# Patient Record
Sex: MALE | Race: WHITE | Employment: OTHER | URBAN - METROPOLITAN AREA
[De-identification: names, ages, dates, MRNs, and addresses within clinical notes are randomized per-mention and may not be internally consistent; named-entity substitution may affect disease eponyms.]

---

## 2019-03-14 ENCOUNTER — HOSPITAL ENCOUNTER (OUTPATIENT)
Dept: GENERAL RADIOLOGY | Age: 84
Discharge: HOME | End: 2019-03-16
Payer: MEDICARE

## 2019-03-14 ENCOUNTER — HOSPITAL ENCOUNTER (OUTPATIENT)
Age: 84
Discharge: HOME | End: 2019-03-16
Payer: MEDICARE

## 2019-03-14 DIAGNOSIS — Z96.641 AFTERCARE FOLLOWING RIGHT HIP JOINT REPLACEMENT SURGERY: ICD-10-CM

## 2019-03-14 DIAGNOSIS — Z47.1 AFTERCARE FOLLOWING RIGHT HIP JOINT REPLACEMENT SURGERY: ICD-10-CM

## 2019-03-14 PROCEDURE — 73502 X-RAY EXAM HIP UNI 2-3 VIEWS: CPT

## 2020-03-23 ENCOUNTER — INITIAL CONSULT (OUTPATIENT)
Dept: GERIATRIC MEDICINE | Age: 85
End: 2020-03-23
Payer: MEDICARE

## 2020-03-23 VITALS
HEART RATE: 80 BPM | TEMPERATURE: 97.9 F | DIASTOLIC BLOOD PRESSURE: 84 MMHG | RESPIRATION RATE: 26 BRPM | SYSTOLIC BLOOD PRESSURE: 132 MMHG | HEIGHT: 67 IN | WEIGHT: 243.8 LBS | BODY MASS INDEX: 38.27 KG/M2

## 2020-03-23 PROCEDURE — G8482 FLU IMMUNIZE ORDER/ADMIN: HCPCS | Performed by: INTERNAL MEDICINE

## 2020-03-23 PROCEDURE — 4040F PNEUMOC VAC/ADMIN/RCVD: CPT | Performed by: INTERNAL MEDICINE

## 2020-03-23 PROCEDURE — 1036F TOBACCO NON-USER: CPT | Performed by: INTERNAL MEDICINE

## 2020-03-23 PROCEDURE — 1123F ACP DISCUSS/DSCN MKR DOCD: CPT | Performed by: INTERNAL MEDICINE

## 2020-03-23 PROCEDURE — 99201 HC NEW PT, E/M LEVEL 1: CPT | Performed by: INTERNAL MEDICINE

## 2020-03-23 PROCEDURE — G8417 CALC BMI ABV UP PARAM F/U: HCPCS | Performed by: INTERNAL MEDICINE

## 2020-03-23 PROCEDURE — 96372 THER/PROPH/DIAG INJ SC/IM: CPT | Performed by: INTERNAL MEDICINE

## 2020-03-23 PROCEDURE — 99212 OFFICE O/P EST SF 10 MIN: CPT | Performed by: INTERNAL MEDICINE

## 2020-03-23 PROCEDURE — G8427 DOCREV CUR MEDS BY ELIG CLIN: HCPCS | Performed by: INTERNAL MEDICINE

## 2020-03-23 RX ORDER — DONEPEZIL HYDROCHLORIDE 10 MG/1
10 TABLET, FILM COATED ORAL DAILY
COMMUNITY
End: 2020-08-21 | Stop reason: SDUPTHER

## 2020-03-23 RX ORDER — FENOFIBRATE 145 MG/1
145 TABLET, COATED ORAL DAILY
Status: ON HOLD | COMMUNITY
End: 2022-01-01

## 2020-03-23 RX ORDER — CYANOCOBALAMIN 1000 UG/ML
1000 INJECTION INTRAMUSCULAR; SUBCUTANEOUS ONCE
Status: COMPLETED | OUTPATIENT
Start: 2020-03-23 | End: 2020-03-23

## 2020-03-23 RX ORDER — FUROSEMIDE 40 MG/1
40 TABLET ORAL DAILY
Status: ON HOLD | COMMUNITY
End: 2021-01-01 | Stop reason: HOSPADM

## 2020-03-23 RX ORDER — BENAZEPRIL HYDROCHLORIDE 40 MG/1
40 TABLET, FILM COATED ORAL 2 TIMES DAILY
Status: ON HOLD | COMMUNITY
End: 2021-01-01 | Stop reason: HOSPADM

## 2020-03-23 RX ORDER — MEMANTINE HYDROCHLORIDE 10 MG/1
10 TABLET ORAL 2 TIMES DAILY
COMMUNITY

## 2020-03-23 RX ORDER — GLIMEPIRIDE 2 MG/1
2 TABLET ORAL
Status: ON HOLD | COMMUNITY
End: 2020-04-24 | Stop reason: HOSPADM

## 2020-03-23 RX ADMIN — CYANOCOBALAMIN 1000 MCG: 1000 INJECTION, SOLUTION INTRAMUSCULAR; SUBCUTANEOUS at 16:39

## 2020-03-23 ASSESSMENT — PATIENT HEALTH QUESTIONNAIRE - PHQ9
SUM OF ALL RESPONSES TO PHQ9 QUESTIONS 1 & 2: 0
SUM OF ALL RESPONSES TO PHQ QUESTIONS 1-9: 0
1. LITTLE INTEREST OR PLEASURE IN DOING THINGS: 0
2. FEELING DOWN, DEPRESSED OR HOPELESS: 0
SUM OF ALL RESPONSES TO PHQ QUESTIONS 1-9: 0

## 2020-03-23 NOTE — PROGRESS NOTES
Chief Complaint   Patient presents with    Consultation     Referral from PCP, Dr Gregoria Ferrara re: memory issues.

## 2020-03-30 ENCOUNTER — TELEPHONE (OUTPATIENT)
Dept: GERIATRIC MEDICINE | Age: 85
End: 2020-03-30

## 2020-04-13 ENCOUNTER — TELEPHONE (OUTPATIENT)
Dept: GERIATRIC MEDICINE | Age: 85
End: 2020-04-13

## 2020-04-15 NOTE — TELEPHONE ENCOUNTER
Left message for daughter - wanting to discuss BMV form. I will be out of the office until Monday and will try to reach her then.

## 2020-04-19 ENCOUNTER — APPOINTMENT (OUTPATIENT)
Dept: GENERAL RADIOLOGY | Age: 85
DRG: 175 | End: 2020-04-19
Payer: MEDICARE

## 2020-04-19 ENCOUNTER — HOSPITAL ENCOUNTER (INPATIENT)
Age: 85
LOS: 5 days | Discharge: NURSING, REHAB OR LONG-TERM CARE | DRG: 175 | End: 2020-04-24
Attending: EMERGENCY MEDICINE | Admitting: FAMILY MEDICINE
Payer: MEDICARE

## 2020-04-19 ENCOUNTER — APPOINTMENT (OUTPATIENT)
Dept: CT IMAGING | Age: 85
DRG: 175 | End: 2020-04-19
Payer: MEDICARE

## 2020-04-19 PROBLEM — I26.99 ACUTE PULMONARY EMBOLISM WITHOUT ACUTE COR PULMONALE (HCC): Status: ACTIVE | Noted: 2020-04-19

## 2020-04-19 LAB
ALBUMIN SERPL-MCNC: 3.3 G/DL (ref 3.5–5.2)
ALP BLD-CCNC: 36 U/L (ref 40–129)
ALT SERPL-CCNC: 12 U/L (ref 0–40)
ANION GAP SERPL CALCULATED.3IONS-SCNC: 14 MMOL/L (ref 7–16)
APTT: 33.3 SEC (ref 24.5–35.1)
APTT: 92.9 SEC (ref 24.5–35.1)
AST SERPL-CCNC: 39 U/L (ref 0–39)
BASOPHILS ABSOLUTE: 0.08 E9/L (ref 0–0.2)
BASOPHILS RELATIVE PERCENT: 0.9 % (ref 0–2)
BILIRUB SERPL-MCNC: 0.3 MG/DL (ref 0–1.2)
BILIRUBIN DIRECT: <0.2 MG/DL (ref 0–0.3)
BILIRUBIN, INDIRECT: ABNORMAL MG/DL (ref 0–1)
BUN BLDV-MCNC: 16 MG/DL (ref 8–23)
CALCIUM SERPL-MCNC: 9.1 MG/DL (ref 8.6–10.2)
CHLORIDE BLD-SCNC: 99 MMOL/L (ref 98–107)
CO2: 25 MMOL/L (ref 22–29)
CREAT SERPL-MCNC: 1.5 MG/DL (ref 0.7–1.2)
D DIMER: 678 NG/ML DDU
EKG ATRIAL RATE: 105 BPM
EKG Q-T INTERVAL: 382 MS
EKG QRS DURATION: 116 MS
EKG QTC CALCULATION (BAZETT): 457 MS
EKG R AXIS: -63 DEGREES
EKG T AXIS: 64 DEGREES
EKG VENTRICULAR RATE: 86 BPM
EOSINOPHILS ABSOLUTE: 0.2 E9/L (ref 0.05–0.5)
EOSINOPHILS RELATIVE PERCENT: 2.4 % (ref 0–6)
FIBRINOGEN: 553 MG/DL (ref 225–540)
GFR AFRICAN AMERICAN: 54
GFR NON-AFRICAN AMERICAN: 44 ML/MIN/1.73
GLUCOSE BLD-MCNC: 232 MG/DL (ref 74–99)
HCT VFR BLD CALC: 46.6 % (ref 37–54)
HEMOGLOBIN: 15.3 G/DL (ref 12.5–16.5)
IMMATURE GRANULOCYTES #: 0.04 E9/L
IMMATURE GRANULOCYTES %: 0.5 % (ref 0–5)
INR BLD: 1.1
LACTATE DEHYDROGENASE: 334 U/L (ref 135–225)
LYMPHOCYTES ABSOLUTE: 2.03 E9/L (ref 1.5–4)
LYMPHOCYTES RELATIVE PERCENT: 23.9 % (ref 20–42)
MCH RBC QN AUTO: 30.6 PG (ref 26–35)
MCHC RBC AUTO-ENTMCNC: 32.8 % (ref 32–34.5)
MCV RBC AUTO: 93.2 FL (ref 80–99.9)
METER GLUCOSE: 190 MG/DL (ref 74–99)
MONOCYTES ABSOLUTE: 0.81 E9/L (ref 0.1–0.95)
MONOCYTES RELATIVE PERCENT: 9.5 % (ref 2–12)
NEUTROPHILS ABSOLUTE: 5.35 E9/L (ref 1.8–7.3)
NEUTROPHILS RELATIVE PERCENT: 62.8 % (ref 43–80)
PDW BLD-RTO: 13.9 FL (ref 11.5–15)
PLATELET # BLD: 248 E9/L (ref 130–450)
PMV BLD AUTO: 11.5 FL (ref 7–12)
POTASSIUM SERPL-SCNC: 3.8 MMOL/L (ref 3.5–5)
PRO-BNP: 8530 PG/ML (ref 0–450)
PROCALCITONIN: 0.07 NG/ML (ref 0–0.08)
PROTHROMBIN TIME: 12.7 SEC (ref 9.3–12.4)
RBC # BLD: 5 E12/L (ref 3.8–5.8)
REASON FOR REJECTION: NORMAL
REJECTED TEST: NORMAL
SARS-COV-2, NAAT: NOT DETECTED
SODIUM BLD-SCNC: 138 MMOL/L (ref 132–146)
TOTAL PROTEIN: 6.4 G/DL (ref 6.4–8.3)
TROPONIN: 0.03 NG/ML (ref 0–0.03)
WBC # BLD: 8.5 E9/L (ref 4.5–11.5)

## 2020-04-19 PROCEDURE — 84484 ASSAY OF TROPONIN QUANT: CPT

## 2020-04-19 PROCEDURE — 83615 LACTATE (LD) (LDH) ENZYME: CPT

## 2020-04-19 PROCEDURE — 93010 ELECTROCARDIOGRAM REPORT: CPT | Performed by: INTERNAL MEDICINE

## 2020-04-19 PROCEDURE — 85378 FIBRIN DEGRADE SEMIQUANT: CPT

## 2020-04-19 PROCEDURE — 85730 THROMBOPLASTIN TIME PARTIAL: CPT

## 2020-04-19 PROCEDURE — 84145 PROCALCITONIN (PCT): CPT

## 2020-04-19 PROCEDURE — 71275 CT ANGIOGRAPHY CHEST: CPT

## 2020-04-19 PROCEDURE — 86140 C-REACTIVE PROTEIN: CPT

## 2020-04-19 PROCEDURE — 94761 N-INVAS EAR/PLS OXIMETRY MLT: CPT

## 2020-04-19 PROCEDURE — 2580000003 HC RX 258: Performed by: RADIOLOGY

## 2020-04-19 PROCEDURE — 93005 ELECTROCARDIOGRAM TRACING: CPT | Performed by: STUDENT IN AN ORGANIZED HEALTH CARE EDUCATION/TRAINING PROGRAM

## 2020-04-19 PROCEDURE — 2580000003 HC RX 258: Performed by: PHYSICIAN ASSISTANT

## 2020-04-19 PROCEDURE — 82962 GLUCOSE BLOOD TEST: CPT

## 2020-04-19 PROCEDURE — 96374 THER/PROPH/DIAG INJ IV PUSH: CPT

## 2020-04-19 PROCEDURE — 83520 IMMUNOASSAY QUANT NOS NONAB: CPT

## 2020-04-19 PROCEDURE — 6370000000 HC RX 637 (ALT 250 FOR IP): Performed by: PHYSICIAN ASSISTANT

## 2020-04-19 PROCEDURE — 6360000004 HC RX CONTRAST MEDICATION: Performed by: RADIOLOGY

## 2020-04-19 PROCEDURE — 80048 BASIC METABOLIC PNL TOTAL CA: CPT

## 2020-04-19 PROCEDURE — 0100U HC RESPIRPTHGN MULT REV TRANS & AMP PRB TECH 21 TRGT: CPT

## 2020-04-19 PROCEDURE — 6360000002 HC RX W HCPCS: Performed by: STUDENT IN AN ORGANIZED HEALTH CARE EDUCATION/TRAINING PROGRAM

## 2020-04-19 PROCEDURE — 99223 1ST HOSP IP/OBS HIGH 75: CPT | Performed by: INTERNAL MEDICINE

## 2020-04-19 PROCEDURE — 85384 FIBRINOGEN ACTIVITY: CPT

## 2020-04-19 PROCEDURE — 85610 PROTHROMBIN TIME: CPT

## 2020-04-19 PROCEDURE — 80076 HEPATIC FUNCTION PANEL: CPT

## 2020-04-19 PROCEDURE — 36415 COLL VENOUS BLD VENIPUNCTURE: CPT

## 2020-04-19 PROCEDURE — 2060000000 HC ICU INTERMEDIATE R&B

## 2020-04-19 PROCEDURE — 71045 X-RAY EXAM CHEST 1 VIEW: CPT

## 2020-04-19 PROCEDURE — 83036 HEMOGLOBIN GLYCOSYLATED A1C: CPT

## 2020-04-19 PROCEDURE — 99285 EMERGENCY DEPT VISIT HI MDM: CPT

## 2020-04-19 PROCEDURE — 93005 ELECTROCARDIOGRAM TRACING: CPT | Performed by: INTERNAL MEDICINE

## 2020-04-19 PROCEDURE — 83880 ASSAY OF NATRIURETIC PEPTIDE: CPT

## 2020-04-19 PROCEDURE — U0002 COVID-19 LAB TEST NON-CDC: HCPCS

## 2020-04-19 PROCEDURE — 85025 COMPLETE CBC W/AUTO DIFF WBC: CPT

## 2020-04-19 PROCEDURE — 2700000000 HC OXYGEN THERAPY PER DAY

## 2020-04-19 RX ORDER — POTASSIUM CHLORIDE 20 MEQ/1
40 TABLET, EXTENDED RELEASE ORAL PRN
Status: DISCONTINUED | OUTPATIENT
Start: 2020-04-19 | End: 2020-04-24 | Stop reason: HOSPADM

## 2020-04-19 RX ORDER — DONEPEZIL HYDROCHLORIDE 5 MG/1
10 TABLET, FILM COATED ORAL DAILY
Status: DISCONTINUED | OUTPATIENT
Start: 2020-04-19 | End: 2020-04-24 | Stop reason: HOSPADM

## 2020-04-19 RX ORDER — DEXTROSE MONOHYDRATE 25 G/50ML
12.5 INJECTION, SOLUTION INTRAVENOUS PRN
Status: DISCONTINUED | OUTPATIENT
Start: 2020-04-19 | End: 2020-04-24 | Stop reason: HOSPADM

## 2020-04-19 RX ORDER — LISINOPRIL 20 MG/1
40 TABLET ORAL 2 TIMES DAILY
Status: DISCONTINUED | OUTPATIENT
Start: 2020-04-19 | End: 2020-04-24 | Stop reason: HOSPADM

## 2020-04-19 RX ORDER — POTASSIUM CHLORIDE 7.45 MG/ML
10 INJECTION INTRAVENOUS PRN
Status: DISCONTINUED | OUTPATIENT
Start: 2020-04-19 | End: 2020-04-24 | Stop reason: HOSPADM

## 2020-04-19 RX ORDER — NICOTINE POLACRILEX 4 MG
15 LOZENGE BUCCAL PRN
Status: DISCONTINUED | OUTPATIENT
Start: 2020-04-19 | End: 2020-04-24 | Stop reason: HOSPADM

## 2020-04-19 RX ORDER — HEPARIN SODIUM 1000 [USP'U]/ML
80 INJECTION, SOLUTION INTRAVENOUS; SUBCUTANEOUS ONCE
Status: COMPLETED | OUTPATIENT
Start: 2020-04-19 | End: 2020-04-19

## 2020-04-19 RX ORDER — HYDROXYCHLOROQUINE SULFATE 200 MG/1
200 TABLET, FILM COATED ORAL EVERY 12 HOURS
Status: DISCONTINUED | OUTPATIENT
Start: 2020-04-20 | End: 2020-04-19

## 2020-04-19 RX ORDER — BUDESONIDE AND FORMOTEROL FUMARATE DIHYDRATE 80; 4.5 UG/1; UG/1
2 AEROSOL RESPIRATORY (INHALATION) 2 TIMES DAILY
Status: DISCONTINUED | OUTPATIENT
Start: 2020-04-19 | End: 2020-04-24 | Stop reason: HOSPADM

## 2020-04-19 RX ORDER — GLIMEPIRIDE 2 MG/1
2 TABLET ORAL
Status: DISCONTINUED | OUTPATIENT
Start: 2020-04-19 | End: 2020-04-24 | Stop reason: HOSPADM

## 2020-04-19 RX ORDER — DEXTROSE MONOHYDRATE 50 MG/ML
100 INJECTION, SOLUTION INTRAVENOUS PRN
Status: DISCONTINUED | OUTPATIENT
Start: 2020-04-19 | End: 2020-04-24 | Stop reason: HOSPADM

## 2020-04-19 RX ORDER — ONDANSETRON 2 MG/ML
4 INJECTION INTRAMUSCULAR; INTRAVENOUS EVERY 6 HOURS PRN
Status: DISCONTINUED | OUTPATIENT
Start: 2020-04-19 | End: 2020-04-24 | Stop reason: HOSPADM

## 2020-04-19 RX ORDER — ACETAMINOPHEN 325 MG/1
650 TABLET ORAL EVERY 6 HOURS PRN
Status: DISCONTINUED | OUTPATIENT
Start: 2020-04-19 | End: 2020-04-24 | Stop reason: HOSPADM

## 2020-04-19 RX ORDER — LANOLIN ALCOHOL/MO/W.PET/CERES
1000 CREAM (GRAM) TOPICAL DAILY
COMMUNITY

## 2020-04-19 RX ORDER — HEPARIN SODIUM 1000 [USP'U]/ML
80 INJECTION, SOLUTION INTRAVENOUS; SUBCUTANEOUS ONCE
Status: CANCELLED | OUTPATIENT
Start: 2020-04-19 | End: 2020-04-19

## 2020-04-19 RX ORDER — HYDROXYCHLOROQUINE SULFATE 200 MG/1
400 TABLET, FILM COATED ORAL EVERY 12 HOURS
Status: DISCONTINUED | OUTPATIENT
Start: 2020-04-19 | End: 2020-04-19

## 2020-04-19 RX ORDER — HEPARIN SODIUM 10000 [USP'U]/100ML
18 INJECTION, SOLUTION INTRAVENOUS CONTINUOUS
Status: CANCELLED | OUTPATIENT
Start: 2020-04-19

## 2020-04-19 RX ORDER — PROMETHAZINE HYDROCHLORIDE 25 MG/1
12.5 TABLET ORAL EVERY 6 HOURS PRN
Status: DISCONTINUED | OUTPATIENT
Start: 2020-04-19 | End: 2020-04-24 | Stop reason: HOSPADM

## 2020-04-19 RX ORDER — HEPARIN SODIUM 1000 [USP'U]/ML
80 INJECTION, SOLUTION INTRAVENOUS; SUBCUTANEOUS PRN
Status: CANCELLED | OUTPATIENT
Start: 2020-04-19

## 2020-04-19 RX ORDER — GLIMEPIRIDE 2 MG/1
1 TABLET ORAL
Status: DISCONTINUED | OUTPATIENT
Start: 2020-04-20 | End: 2020-04-24 | Stop reason: HOSPADM

## 2020-04-19 RX ORDER — SODIUM CHLORIDE 0.9 % (FLUSH) 0.9 %
10 SYRINGE (ML) INJECTION EVERY 12 HOURS SCHEDULED
Status: DISCONTINUED | OUTPATIENT
Start: 2020-04-19 | End: 2020-04-24 | Stop reason: HOSPADM

## 2020-04-19 RX ORDER — SODIUM CHLORIDE 0.9 % (FLUSH) 0.9 %
10 SYRINGE (ML) INJECTION PRN
Status: COMPLETED | OUTPATIENT
Start: 2020-04-19 | End: 2020-04-19

## 2020-04-19 RX ORDER — ACETAMINOPHEN 650 MG/1
650 SUPPOSITORY RECTAL EVERY 6 HOURS PRN
Status: DISCONTINUED | OUTPATIENT
Start: 2020-04-19 | End: 2020-04-24 | Stop reason: HOSPADM

## 2020-04-19 RX ORDER — MEMANTINE HYDROCHLORIDE 10 MG/1
10 TABLET ORAL 2 TIMES DAILY
Status: DISCONTINUED | OUTPATIENT
Start: 2020-04-19 | End: 2020-04-24 | Stop reason: HOSPADM

## 2020-04-19 RX ORDER — HEPARIN SODIUM 1000 [USP'U]/ML
40 INJECTION, SOLUTION INTRAVENOUS; SUBCUTANEOUS PRN
Status: CANCELLED | OUTPATIENT
Start: 2020-04-19

## 2020-04-19 RX ORDER — FENOFIBRATE 160 MG/1
160 TABLET ORAL DAILY
Status: DISCONTINUED | OUTPATIENT
Start: 2020-04-19 | End: 2020-04-24 | Stop reason: HOSPADM

## 2020-04-19 RX ORDER — HEPARIN SODIUM 1000 [USP'U]/ML
40 INJECTION, SOLUTION INTRAVENOUS; SUBCUTANEOUS PRN
Status: DISCONTINUED | OUTPATIENT
Start: 2020-04-19 | End: 2020-04-24 | Stop reason: HOSPADM

## 2020-04-19 RX ORDER — HEPARIN SODIUM 1000 [USP'U]/ML
80 INJECTION, SOLUTION INTRAVENOUS; SUBCUTANEOUS PRN
Status: DISCONTINUED | OUTPATIENT
Start: 2020-04-19 | End: 2020-04-24 | Stop reason: HOSPADM

## 2020-04-19 RX ORDER — SODIUM CHLORIDE 9 MG/ML
500 INJECTION, SOLUTION INTRAVENOUS CONTINUOUS
Status: DISCONTINUED | OUTPATIENT
Start: 2020-04-19 | End: 2020-04-19

## 2020-04-19 RX ORDER — HEPARIN SODIUM 10000 [USP'U]/100ML
18 INJECTION, SOLUTION INTRAVENOUS CONTINUOUS
Status: DISCONTINUED | OUTPATIENT
Start: 2020-04-19 | End: 2020-04-24 | Stop reason: HOSPADM

## 2020-04-19 RX ORDER — SODIUM CHLORIDE 0.9 % (FLUSH) 0.9 %
10 SYRINGE (ML) INJECTION PRN
Status: DISCONTINUED | OUTPATIENT
Start: 2020-04-19 | End: 2020-04-24 | Stop reason: HOSPADM

## 2020-04-19 RX ADMIN — MEMANTINE HYDROCHLORIDE 10 MG: 10 TABLET, FILM COATED ORAL at 20:30

## 2020-04-19 RX ADMIN — HEPARIN SODIUM 18 UNITS/KG/HR: 10000 INJECTION, SOLUTION INTRAVENOUS at 15:46

## 2020-04-19 RX ADMIN — LISINOPRIL 40 MG: 20 TABLET ORAL at 20:30

## 2020-04-19 RX ADMIN — INSULIN LISPRO 1 UNITS: 100 INJECTION, SOLUTION INTRAVENOUS; SUBCUTANEOUS at 21:27

## 2020-04-19 RX ADMIN — Medication 10 ML: at 14:43

## 2020-04-19 RX ADMIN — HEPARIN SODIUM 9030 UNITS: 1000 INJECTION, SOLUTION INTRAVENOUS; SUBCUTANEOUS at 15:46

## 2020-04-19 RX ADMIN — IOPAMIDOL 90 ML: 755 INJECTION, SOLUTION INTRAVENOUS at 14:45

## 2020-04-19 RX ADMIN — Medication 10 ML: at 21:26

## 2020-04-19 RX ADMIN — BUDESONIDE AND FORMOTEROL FUMARATE DIHYDRATE 2 PUFF: 80; 4.5 AEROSOL RESPIRATORY (INHALATION) at 21:29

## 2020-04-19 ASSESSMENT — ENCOUNTER SYMPTOMS
COUGH: 0
DIARRHEA: 0
VOMITING: 0
CONSTIPATION: 0
COLOR CHANGE: 0
WHEEZING: 0
ABDOMINAL PAIN: 0
SHORTNESS OF BREATH: 1
NAUSEA: 0

## 2020-04-19 NOTE — H&P
[facility] History and Physical      CHIEF COMPLAINT: Shortness of breath    History of Present Illness: This is a 71-year-old  male with past medical history of hyperlipidemia, pericarditis, dementia, diabetes mellitus pretension not in here by the daughter due to shortness of breath. Patient unable to give detailed history but he is uncomfortable now. He mentioned he is in [place] and developed left leg pain and swelling. He developed shortness of breath since last week and progressively worsening. He is supposed to be in rehab facility in [place] but due to [place] 19 pandemic was not able to go there. Patient is self quarantine for a month denies any fever or cough. He denies to have any chest pain or any abdominal pain. He did not give any history of nausea/vomiting/diarrhea. His lab examination shows creatinine 1.5 and proBNP 8,530. Eating 1.5 and WBC 8.5. CT angiography shows bilateral pulmonary embolism. Informant(s) for H&P:Patient's daughter and EMR. REVIEW OF SYSTEMS:  A comprehensive review of systems was negative except for: what is in the HPI      PMH:  Past Medical History:   Diagnosis Date    Hyperlipidemia     Pericarditis        Surgical History:  Past Surgical History:   Procedure Laterality Date    JOINT REPLACEMENT      VASECTOMY         Medications Prior to Admission:    Prior to Admission medications    Medication Sig Start Date End Date Taking? Authorizing Provider   donepezil (ARICEPT) 10 MG tablet Take 10 mg by mouth daily    Historical Provider, MD   memantine (NAMENDA) 10 MG tablet Take 10 mg by mouth 2 times daily    Historical Provider, MD   glimepiride (AMARYL) 2 MG tablet Take 2 mg by mouth 1 tablet before breakfast and 2 tablets before dinner.     Historical Provider, MD   benazepril (LOTENSIN) 40 MG tablet Take 40 mg by mouth 2 times daily    Historical Provider, MD   furosemide (LASIX) 40 MG tablet Take 40 mg by mouth daily the right middle lobe and   the bilateral lower lobes. Largest thrombus is within the distal right   main pulmonary artery which almost occludes this vessel. I discussed   these findings with Dr. Mary Jimenez the emergency room attending. 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 The coronal   and sagittal views do not confirm this as acute dissection. I   discussed these findings with Dr. Mary Jimenez the emergency room   attending. 14 millimeter left lower lobe nodule which is somewhat suspicious. A   follow-up CT of the chest in a few months is recommended. Diffuse interstitial lung disease and peripheral honeycombing. Scattered patchy groundglass densities may reflect manifestations of   interstitial disease or acute infection. XR CHEST PORTABLE   Final Result   No definite evidence of acute cardiopulmonary pathology. Cardiomegaly without decompensation. Equivocal and minimal dependent interstitial density in both lungs   versus overlying chest wall artifact. EKG: ordred. ASSESSMENT:      Principal Problem:    Acute pulmonary embolism without acute cor pulmonale (HCC)  Resolved Problems:    * No resolved hospital problems. *      PLAN:    1. Bilateral pulmonary embolism: Continue heparin drip for now and discussed with patient to change to oral tomorrow. 2.  COVID19 infection: Patient denies any fever or cough. COVID-19 test sent,  follow-up test result. 3.  hypertension: Continue lisinopril 40 mg daily. 4.  Diabetes mellitus: Continue glimepiride 2 mg p.o. daily at night and glimepiride 1 mg in the morning with breakfast.  SSI  5. Dementia: Continue Aricept 10 mg p.o. daily  6. Lipidemia: Continue fenofibrate 160 mg p.o. daily    Code Status: DNR-CCA  DVT prophylaxis: Heparin Drip going on. NOTE: This report was transcribed using voice recognition software.  Every effort was made to ensure accuracy; however, inadvertent computerized

## 2020-04-19 NOTE — CONSULTS
(LOTENSIN) 40 MG tablet Take 40 mg by mouth 2 times daily    Historical Provider, MD   furosemide (LASIX) 40 MG tablet Take 40 mg by mouth daily    Historical Provider, MD   fenofibrate (TRICOR) 145 MG tablet Take 145 mg by mouth daily    Historical Provider, MD   Apoaequorin (PREVAGEN EXTRA STRENGTH) 20 MG CAPS Take 1 capsule by mouth daily    Historical Provider, MD       CURRENT MEDICATIONS:  Current Facility-Administered Medications: heparin (porcine) injection 9,030 Units, 80 Units/kg, Intravenous, PRN  heparin (porcine) injection 4,520 Units, 40 Units/kg, Intravenous, PRN  heparin 25,000 units in dextrose 5% 250 mL infusion, 18 Units/kg/hr, Intravenous, Continuous    IV MEDICATIONS:   heparin (porcine) 18 Units/kg/hr (04/19/20 0951)       ALLERGIES:  No Known Allergies    REVIEW OF SYSTEMS:  General ROS:     - Positive For:     - Negative For: chills, fatigue, fever, malaise, night sweats or sleep disturbance   ENT ROS:      - Positive For:     - Negative For: epistaxis, headaches, sinus pain, sneezing or sore throat   Allergy and Immunology ROS:     - Negative For: nasal congestion, postnasal drip or seasonal allergies   Hematological and Lymphatic ROS:      - Negative For: bleeding problems, bruising, fatigue, night sweats or pallor   Respiratory ROS:      - Positive For:       - Negative For: hemoptysis, pleuritic type chest pains  Cardiovascular ROS:      - Positive For:      - Negative For: chest pain, dyspnea on exertion, irregular heartbeat, loss of consciousness, murmur, orthopnea or palpitations   Gastrointestinal ROS:      - Positive For:     - Negative For: abdominal pain, appetite loss, blood in stools, change in bowel habits, change in stools, constipation, diarrhea, hematemesis, melena, nausea/vomiting or stool incontinence   Genito-Urinary ROS:      - Negative For: change in urinary stream, dysuria, hematuria or incontinence   Musculoskeletal ROS:      - Negative For: joint pain, joint stiffness, joint swelling or muscle pain   Neurological ROS:     - Negative For: behavioral changes, confusion, dizziness, headaches, impaired coordination/balance or memory loss   Dermatological ROS:      - Negative For: hair changes, lumps, mole changes, nail changes or pruritus    PHYSICAL EXAMINATION:     VITAL SIGNS:  BP (!) 160/70   Pulse 64   Temp 97.4 °F (36.3 °C) (Oral)   Resp 18   Ht 5' 7\" (1.702 m)   Wt 249 lb (112.9 kg)   SpO2 91%   BMI 39.00 kg/m²   Wt Readings from Last 3 Encounters:   20 249 lb (112.9 kg)   20 243 lb 12.8 oz (110.6 kg)     Temp Readings from Last 3 Encounters:   20 97.4 °F (36.3 °C) (Oral)   20 97.9 °F (36.6 °C) (Oral)     TMAX:  BP Readings from Last 3 Encounters:   20 (!) 160/70   20 132/84     Pulse Readings from Last 3 Encounters:   20 64   20 80       CURRENT PULSE OXIMETRY: SpO2: 91 %  24HR PULSE OXIMETRY RANGE: SpO2  Av.7 %  Min: 91 %  Max: 94 %  CVP:      ________________________________________________________________________    VENTILATOR SETTINGS (if applicable):         Vent Information  SpO2: 91 %  Additional Respiratory  Assessments  Pulse: 64  Resp: 18  SpO2: 91 %  ETCO2:  Peak Inspiratory Pressure:  End-Inspiratory Plateau Pressure:    ABG:  No results for input(s): PH, PO2, PCO2, HCO3, BE, O2SAT, METHB, O2HB, COHB, O2CON, HHB, THB in the last 72 hours. ________________________________________________________________________    IV ACCESS:    NUTRITION: No diet orders on file    INTAKE/OUTPUTS:  No intake/output data recorded.   No intake or output data in the 24 hours ending 20 1602    General Appearance: alert and oriented to person, place and time, well-developed and   well-nourished, in no acute distress   Eyes: pupils equal, round, and reactive to light, extraocular eye movements intact, conjunctivae normal and sclera anicteric   Neck: neck supple and non tender without mass, no thyromegaly, no LABA1C 6.7 (H) 03/06/2017     No results found for: EAG  SHAN: No results found for: SHAN  ESR:   Lab Results   Component Value Date    SEDRATE 13 03/06/2017     CRP: No results found for: CRP  D Dimer: No results found for: DDIMER  Folate and B12: No results found for: [id number], No results found for: FOLATE    Lactic Acid: No results found for: LACTA  Ammonia:   Cortisol:  Thyroid Studies:  No results found for: TSH, [id number], W6KDYUG, THYROIDAB    CTA Chest 4/19/2020:  Pulmonary embolism bilateral upper lobes, the right middle lobe and   the bilateral lower lobes. Largest thrombus is within the distal right   main pulmonary artery which almost occludes this vessel. I discussed   these findings with Dr. Zepeda Me the emergency room attending.       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 The coronal   and sagittal views do not confirm this as acute dissection. I   discussed these findings with Dr. Zepeda Me the emergency room   attending.       14 millimeter left lower lobe nodule which is somewhat suspicious. A   follow-up CT of the chest in a few months is recommended.       Diffuse interstitial lung disease and peripheral honeycombing.       Scattered patchy groundglass densities may reflect manifestations of   interstitial disease or acute infection. ASSESSMENT:  1.) B/L Upper Lobe Pulmonary Embolism   2.) Suspected COVID-19 Viral Pneumonia   3.) H/O Aortic Arch Dissection   4.) H/O Pericarditis   5.) 1.4 cm LLL Lung Nodule   6.) Interstitial Disease with Peripheral Honeycombing - suspect underlying degree fo IPF or GERD  7.) Acute Kidney Injury     PLAN:  1.) respiratory panel, COVID-19 PCR  2.) heparin drip  3.) O2, IS  4.) CT abd/pelvis if/when COVID-19 comes back to determine any abdominal pathology   5.) droplet precautions     Thank you Latesha Powers, DO very much for allowing me to see this patient in consultation and follow up.     Care reviewed with nursing staff, medical and surgical specialty care, primary care and the patient's family as available. Restraints are ordered when the patient can do harm to him/herself by pulling out devices.     Renato Freeman M.D.

## 2020-04-19 NOTE — ED PROVIDER NOTES
Patient is an 70-year-old male history of pericarditis, hyperlipidemia, who presents the ED for shortness of breath. Patient is with daughter. Patient daughter provides majority of the history. She states that the patient has had some worsening shortness of breath over the last week. She is from [place], states that patient has had worsening shortness of breath. And a couple of days ago, began having some left leg swelling as well. This has not been there in the past.  Patient was supposed to be sent to an assisted living facility in [place], however because of the COVID-19 pandemic, patient was no longer able allowed to be sent there. Patient has been self quarantine for over a month, states that he denies any cough or fever or chills, no chest pain, no history of DVT or PE, no recent surgeries. Patient is not on any blood thinners. Patient has had some increase in memory loss. Daughter states that he otherwise has no other complaints. They were supposed to see the patient's wife today, she had passed approximately 9 years ago. The history is provided by the patient. No  was used. Review of Systems   Constitutional: Negative for chills and fever. Respiratory: Positive for shortness of breath. Negative for cough and wheezing. Cardiovascular: Negative for chest pain and palpitations. Gastrointestinal: Negative for abdominal pain, constipation, diarrhea, nausea and vomiting. Endocrine: Negative for cold intolerance and heat intolerance. Genitourinary: Negative for dysuria and hematuria. Musculoskeletal: Negative for neck pain and neck stiffness. Skin: Negative for color change, pallor, rash and wound. Neurological: Negative for dizziness, syncope, light-headedness, numbness and headaches. Hematological: Negative for adenopathy. Psychiatric/Behavioral: Negative for confusion and decreased concentration. The patient is not nervous/anxious.     All other 0.7 - 1.2 mg/dL    GFR Non-African American 44 >=60 mL/min/1.73    GFR African American 54     Calcium 9.1 8.6 - 10.2 mg/dL   Brain Natriuretic Peptide   Result Value Ref Range    Pro-BNP 8,530 (H) 0 - 450 pg/mL   Troponin   Result Value Ref Range    Troponin 0.03 0.00 - 0.03 ng/mL   APTT   Result Value Ref Range    aPTT 33.3 24.5 - 35.1 sec       RADIOLOGY:  Xr Chest Portable    Result Date: 2020  Patient MRN: [id number] : 1934 Age:  80 years Gender: Male Order Date: 2020 1:15 PM Exam: XR CHEST PORTABLE Number of Images: 1 view Indication:  Shortness of breath, fever Comparison: None. Findings: The lungs are symmetrically expanded, and show very subtle interstitial density in the lower lungs which could be artifact related to overlying large habitus (external soft tissues), but otherwise are clear. There is no evidence of pneumothorax or pleural effusion. Cardiovascular shadows show cardiomegaly with aortic intimal calcification and tortuosity, but no evidence of acute decompensation. Skeletal structures show no evidence of acute pathology. Hypertrophic degenerative spinal findings are noted. Overlying EKG leads and oxygen tubing are present. No definite evidence of acute cardiopulmonary pathology. Cardiomegaly without decompensation. Equivocal and minimal dependent interstitial density in both lungs versus overlying chest wall artifact. Cta Chest W Contrast    Result Date: 2020  Clinical indications: Chest pain. Pulmonary embolus. COMPARISON: Chest x-ray 2020. Exposure control: This examination and all examinations utilizing ionizing radiation at this facility done so according to the ALARA (as low as reasonably achievable) principal for radiation dose reduction. Technique: Axial, sagittal, and coronal computed tomography of the chest was performed following intravenous administration of 90 mL of Isovue-370. 3-D postprocessing.  Three-dimensional reconstructions of the arterial confirm this as acute dissection. I discussed these findings with Dr. Helton Fitting the emergency room attending. 14 millimeter left lower lobe nodule which is somewhat suspicious. A follow-up CT of the chest in a few months is recommended. Diffuse interstitial lung disease and peripheral honeycombing. Scattered patchy groundglass densities may reflect manifestations of interstitial disease or acute infection.        ------------------------- NURSING NOTES AND VITALS REVIEWED ---------------------------  Date / Time Roomed:  4/19/2020 12:39 PM  ED Bed Assignment:  06/06    The nursing notes within the ED encounter and vital signs as below have been reviewed. Patient Vitals for the past 24 hrs:   BP Temp Temp src Pulse Resp SpO2 Height Weight   04/19/20 1559 (!) 160/70 -- -- 64 -- 91 % -- --   04/19/20 1519 -- -- -- -- -- -- 5' 7\" (1.702 m) 249 lb (112.9 kg)   04/19/20 1436 (!) 166/89 -- -- 64 -- 93 % -- --   04/19/20 1403 (!) 167/99 -- -- 78 18 94 % -- --   04/19/20 1400 (!) 173/91 -- -- 68 -- 92 % -- --   04/19/20 1253 (!) 173/91 -- -- -- -- 94 % -- --   04/19/20 1237 -- 97.4 °F (36.3 °C) Oral 88 18 92 % -- --       Oxygen Saturation Interpretation: Abnormal    ------------------------------------------ PROGRESS NOTES ------------------------------------------  ED Course as of Apr 19 1625   Sun Apr 19, 2020   1526 Discussed with daughter regarding patient. She states that she is not power of  but will be discussing the patient with her sister who is POA. States that she is unsure of the patient's code status, and will let us know what the decision is. [KS]   0032 [id number] with Daxa regarding patient. She was agreeable with admission. Will contact pulmonary for further evaluation. [KS]   0297 On review of the CT scan. Patient was found to have ground glass opacities, and COVID testing was added for COVID 19 rule out.     [KS]   1930 Discussed with Dr. Raman Jonas who states high dose heparin at this

## 2020-04-20 LAB
ANION GAP SERPL CALCULATED.3IONS-SCNC: 16 MMOL/L (ref 7–16)
APTT: 71 SEC (ref 24.5–35.1)
BASOPHILS ABSOLUTE: 0.11 E9/L (ref 0–0.2)
BASOPHILS RELATIVE PERCENT: 1.2 % (ref 0–2)
BUN BLDV-MCNC: 16 MG/DL (ref 8–23)
C-REACTIVE PROTEIN: 2.8 MG/DL (ref 0–0.4)
CALCIUM SERPL-MCNC: 9 MG/DL (ref 8.6–10.2)
CHLORIDE BLD-SCNC: 104 MMOL/L (ref 98–107)
CO2: 23 MMOL/L (ref 22–29)
CREAT SERPL-MCNC: 1.2 MG/DL (ref 0.7–1.2)
EKG ATRIAL RATE: 78 BPM
EKG ATRIAL RATE: 96 BPM
EKG Q-T INTERVAL: 442 MS
EKG Q-T INTERVAL: 460 MS
EKG QRS DURATION: 120 MS
EKG QRS DURATION: 122 MS
EKG QTC CALCULATION (BAZETT): 452 MS
EKG QTC CALCULATION (BAZETT): 482 MS
EKG R AXIS: -54 DEGREES
EKG R AXIS: -56 DEGREES
EKG T AXIS: 5 DEGREES
EKG T AXIS: 56 DEGREES
EKG VENTRICULAR RATE: 63 BPM
EKG VENTRICULAR RATE: 66 BPM
EOSINOPHILS ABSOLUTE: 0.37 E9/L (ref 0.05–0.5)
EOSINOPHILS RELATIVE PERCENT: 4 % (ref 0–6)
GFR AFRICAN AMERICAN: >60
GFR NON-AFRICAN AMERICAN: 57 ML/MIN/1.73
GLUCOSE BLD-MCNC: 184 MG/DL (ref 74–99)
HBA1C MFR BLD: 8.6 % (ref 4–5.6)
HCT VFR BLD CALC: 46 % (ref 37–54)
HEMOGLOBIN: 15.1 G/DL (ref 12.5–16.5)
IMMATURE GRANULOCYTES #: 0.03 E9/L
IMMATURE GRANULOCYTES %: 0.3 % (ref 0–5)
LYMPHOCYTES ABSOLUTE: 2.6 E9/L (ref 1.5–4)
LYMPHOCYTES RELATIVE PERCENT: 28.1 % (ref 20–42)
MCH RBC QN AUTO: 30.4 PG (ref 26–35)
MCHC RBC AUTO-ENTMCNC: 32.8 % (ref 32–34.5)
MCV RBC AUTO: 92.7 FL (ref 80–99.9)
METER GLUCOSE: 148 MG/DL (ref 74–99)
METER GLUCOSE: 164 MG/DL (ref 74–99)
METER GLUCOSE: 212 MG/DL (ref 74–99)
MONOCYTES ABSOLUTE: 0.76 E9/L (ref 0.1–0.95)
MONOCYTES RELATIVE PERCENT: 8.2 % (ref 2–12)
NEUTROPHILS ABSOLUTE: 5.37 E9/L (ref 1.8–7.3)
NEUTROPHILS RELATIVE PERCENT: 58.2 % (ref 43–80)
PDW BLD-RTO: 13.9 FL (ref 11.5–15)
PLATELET # BLD: 248 E9/L (ref 130–450)
PMV BLD AUTO: 11.3 FL (ref 7–12)
POTASSIUM REFLEX MAGNESIUM: 3.7 MMOL/L (ref 3.5–5)
RBC # BLD: 4.96 E12/L (ref 3.8–5.8)
SODIUM BLD-SCNC: 143 MMOL/L (ref 132–146)
WBC # BLD: 9.2 E9/L (ref 4.5–11.5)

## 2020-04-20 PROCEDURE — 85730 THROMBOPLASTIN TIME PARTIAL: CPT

## 2020-04-20 PROCEDURE — 6370000000 HC RX 637 (ALT 250 FOR IP): Performed by: PHYSICIAN ASSISTANT

## 2020-04-20 PROCEDURE — 6370000000 HC RX 637 (ALT 250 FOR IP): Performed by: INTERNAL MEDICINE

## 2020-04-20 PROCEDURE — 2060000000 HC ICU INTERMEDIATE R&B

## 2020-04-20 PROCEDURE — 93010 ELECTROCARDIOGRAM REPORT: CPT | Performed by: INTERNAL MEDICINE

## 2020-04-20 PROCEDURE — 93005 ELECTROCARDIOGRAM TRACING: CPT | Performed by: INTERNAL MEDICINE

## 2020-04-20 PROCEDURE — 82962 GLUCOSE BLOOD TEST: CPT

## 2020-04-20 PROCEDURE — 80048 BASIC METABOLIC PNL TOTAL CA: CPT

## 2020-04-20 PROCEDURE — 99232 SBSQ HOSP IP/OBS MODERATE 35: CPT | Performed by: NURSE PRACTITIONER

## 2020-04-20 PROCEDURE — 36415 COLL VENOUS BLD VENIPUNCTURE: CPT

## 2020-04-20 PROCEDURE — 2700000000 HC OXYGEN THERAPY PER DAY

## 2020-04-20 PROCEDURE — 2580000003 HC RX 258: Performed by: PHYSICIAN ASSISTANT

## 2020-04-20 PROCEDURE — 85025 COMPLETE CBC W/AUTO DIFF WBC: CPT

## 2020-04-20 RX ADMIN — APIXABAN 10 MG: 5 TABLET, FILM COATED ORAL at 19:44

## 2020-04-20 RX ADMIN — INSULIN LISPRO 2 UNITS: 100 INJECTION, SOLUTION INTRAVENOUS; SUBCUTANEOUS at 12:00

## 2020-04-20 RX ADMIN — FENOFIBRATE 160 MG: 160 TABLET ORAL at 08:14

## 2020-04-20 RX ADMIN — GLIMEPIRIDE 1 MG: 2 TABLET ORAL at 08:15

## 2020-04-20 RX ADMIN — MEMANTINE HYDROCHLORIDE 10 MG: 10 TABLET, FILM COATED ORAL at 19:44

## 2020-04-20 RX ADMIN — INSULIN LISPRO 1 UNITS: 100 INJECTION, SOLUTION INTRAVENOUS; SUBCUTANEOUS at 19:57

## 2020-04-20 RX ADMIN — MEMANTINE HYDROCHLORIDE 10 MG: 10 TABLET, FILM COATED ORAL at 08:14

## 2020-04-20 RX ADMIN — BUDESONIDE AND FORMOTEROL FUMARATE DIHYDRATE 2 PUFF: 80; 4.5 AEROSOL RESPIRATORY (INHALATION) at 19:44

## 2020-04-20 RX ADMIN — Medication 10 ML: at 19:49

## 2020-04-20 RX ADMIN — ACETAMINOPHEN 650 MG: 325 TABLET ORAL at 16:52

## 2020-04-20 RX ADMIN — DONEPEZIL HYDROCHLORIDE 10 MG: 5 TABLET, FILM COATED ORAL at 08:14

## 2020-04-20 RX ADMIN — LISINOPRIL 40 MG: 20 TABLET ORAL at 19:44

## 2020-04-20 RX ADMIN — LISINOPRIL 40 MG: 20 TABLET ORAL at 08:14

## 2020-04-20 RX ADMIN — BUDESONIDE AND FORMOTEROL FUMARATE DIHYDRATE 2 PUFF: 80; 4.5 AEROSOL RESPIRATORY (INHALATION) at 08:13

## 2020-04-20 RX ADMIN — INSULIN LISPRO 1 UNITS: 100 INJECTION, SOLUTION INTRAVENOUS; SUBCUTANEOUS at 17:00

## 2020-04-20 RX ADMIN — GLIMEPIRIDE 2 MG: 2 TABLET ORAL at 16:52

## 2020-04-20 RX ADMIN — INSULIN LISPRO 1 UNITS: 100 INJECTION, SOLUTION INTRAVENOUS; SUBCUTANEOUS at 09:00

## 2020-04-20 ASSESSMENT — PAIN DESCRIPTION - LOCATION: LOCATION: HEAD

## 2020-04-20 ASSESSMENT — PAIN SCALES - GENERAL
PAINLEVEL_OUTOF10: 6
PAINLEVEL_OUTOF10: 0
PAINLEVEL_OUTOF10: 3

## 2020-04-20 ASSESSMENT — PAIN - FUNCTIONAL ASSESSMENT: PAIN_FUNCTIONAL_ASSESSMENT: ACTIVITIES ARE NOT PREVENTED

## 2020-04-20 ASSESSMENT — PAIN DESCRIPTION - PAIN TYPE: TYPE: ACUTE PAIN

## 2020-04-20 ASSESSMENT — PAIN DESCRIPTION - PROGRESSION: CLINICAL_PROGRESSION: GRADUALLY WORSENING

## 2020-04-20 ASSESSMENT — PAIN DESCRIPTION - ORIENTATION: ORIENTATION: RIGHT;LEFT;MID

## 2020-04-20 ASSESSMENT — PAIN DESCRIPTION - ONSET: ONSET: GRADUAL

## 2020-04-20 ASSESSMENT — PAIN DESCRIPTION - DESCRIPTORS: DESCRIPTORS: HEADACHE;ACHING;DULL

## 2020-04-20 ASSESSMENT — PAIN DESCRIPTION - FREQUENCY: FREQUENCY: CONTINUOUS

## 2020-04-20 NOTE — PROGRESS NOTES
chest in a few months is recommended.       Diffuse interstitial lung disease and peripheral honeycombing.       Scattered patchy groundglass densities may reflect manifestations of   interstitial disease or acute infection. ASSESSMENT:  1.) B/L Upper Lobe Pulmonary Embolism   2.) Suspected COVID-19 Viral Pneumonia (negative test)  3.) H/O Aortic Arch Dissection   4.) H/O Pericarditis   5.) 1.4 cm LLL Lung Nodule   6.) Interstitial Disease with Peripheral Honeycombing - suspect underlying degree fo IPF or GERD  7.) Acute Kidney Injury     PLAN:  1.) COVID-19 PCR (-)  2.) heparin drip, will switch to Apixiban. Will need anticoagulation x 6 months if not contraindicated. Not a good candidate for lifelong anticoagulation due to h/o Dementia  3.) O2, IS. Home o2 eval in am  4.) consider to update screening work up for malignancy based on age and gender. Fco Saldana M.D.     Daughter, Aislinnrosemary Blount, was updated with today's A/P

## 2020-04-20 NOTE — PROGRESS NOTES
millimeter left lower lobe nodule which is somewhat suspicious. A   follow-up CT of the chest in a few months is recommended. Diffuse interstitial lung disease and peripheral honeycombing. Scattered patchy groundglass densities may reflect manifestations of   interstitial disease or acute infection. XR CHEST PORTABLE   Final Result   No definite evidence of acute cardiopulmonary pathology. Cardiomegaly without decompensation. Equivocal and minimal dependent interstitial density in both lungs   versus overlying chest wall artifact. Assessment:  Principal Problem:    Acute pulmonary embolism without acute cor pulmonale (HCC)  Resolved Problems:    * No resolved hospital problems. *      Plan:  1. Acute pulmonary emboli (bilateral upper lobe) without acute cor pulmonale-Continue heparin gtt. pulmonary input appreciated. 2. COVID-19 infection - patient without fever/cough. 4/19/2020 COVID-19 not detected. Plaquenil DC'd. Drop of plus precautions  3. HTN- continue lisinopril  4. DM II-A1c 8.6. Continue glimepiride. SSI. Carb controlled diet. Hypoglycemic protocol  5. ETHAN- resolved BUN/Crt 16/1.5. Today 16/1.2 continue to follow  6. Dementia-continue Aricept  7. HLD- continue fenofibrate. NOTE: This report was transcribed using voice recognition software. Every effort was made to ensure accuracy; however, inadvertent computerized transcription errors may be present.     Electronically signed by Rocio Reynolds CNP on 4/20/2020 at 9:23 AM

## 2020-04-20 NOTE — PROGRESS NOTES
Sofie Camacho, daughter, updated on patient's status via telephone.  Per daughter's request, transferred to speak to Corcoran ION.

## 2020-04-20 NOTE — PROGRESS NOTES
Patient opened door, tried to walk into hallway demanding he needs to leave. Patient sent back to his room. Called and spoke to patient via telephone and he demanded \"I am leaving now. I haven't seen a damn doctor. I haven't seen anyone and I am leaving now. You aren't listening and no one has been in the room. \" Patient was reoriented to where he is and what has occurred. Joby Goff NP updated on patient's status.

## 2020-04-20 NOTE — PROGRESS NOTES
Received call from Dr. Bg Dickens, patient to be seen by house physicians due to COVID r/o status. Attending/admitting provider updated on care team. Kuldeep SALINAS updated, as she called unit inquiring as well.

## 2020-04-20 NOTE — PROGRESS NOTES
Patient is agitated, opening room door trying to leave, took oxygen off, got dressed, took heart monitor off, and said he was leaving. Bed alarm was on, patient turned it off. Patient was re-oriented to where he was and why he was here. Patient continued to state \"no one has told me a damn thing. What do you think I'm stupid or something? \" Summary of what occurred today was explained to patient. Patient was instructed not to get up on his own and chair alarm was placed under the patient. Heart monitor and gown placed on patient. Daughter, Tarsha Huang, updated on patient status and spoke to patient.

## 2020-04-21 LAB
ANION GAP SERPL CALCULATED.3IONS-SCNC: 12 MMOL/L (ref 7–16)
BUN BLDV-MCNC: 16 MG/DL (ref 8–23)
CALCIUM SERPL-MCNC: 8.8 MG/DL (ref 8.6–10.2)
CHLORIDE BLD-SCNC: 103 MMOL/L (ref 98–107)
CO2: 26 MMOL/L (ref 22–29)
CREAT SERPL-MCNC: 1.4 MG/DL (ref 0.7–1.2)
GFR AFRICAN AMERICAN: 58
GFR NON-AFRICAN AMERICAN: 48 ML/MIN/1.73
GLUCOSE BLD-MCNC: 155 MG/DL (ref 74–99)
MAGNESIUM: 2.1 MG/DL (ref 1.6–2.6)
METER GLUCOSE: 115 MG/DL (ref 74–99)
METER GLUCOSE: 119 MG/DL (ref 74–99)
METER GLUCOSE: 143 MG/DL (ref 74–99)
METER GLUCOSE: 201 MG/DL (ref 74–99)
PLATELET # BLD: 260 E9/L (ref 130–450)
POTASSIUM REFLEX MAGNESIUM: 3.5 MMOL/L (ref 3.5–5)
SODIUM BLD-SCNC: 141 MMOL/L (ref 132–146)

## 2020-04-21 PROCEDURE — 97165 OT EVAL LOW COMPLEX 30 MIN: CPT

## 2020-04-21 PROCEDURE — 93005 ELECTROCARDIOGRAM TRACING: CPT | Performed by: NURSE PRACTITIONER

## 2020-04-21 PROCEDURE — 97530 THERAPEUTIC ACTIVITIES: CPT

## 2020-04-21 PROCEDURE — 83735 ASSAY OF MAGNESIUM: CPT

## 2020-04-21 PROCEDURE — 2060000000 HC ICU INTERMEDIATE R&B

## 2020-04-21 PROCEDURE — 2700000000 HC OXYGEN THERAPY PER DAY

## 2020-04-21 PROCEDURE — 6370000000 HC RX 637 (ALT 250 FOR IP): Performed by: PHYSICIAN ASSISTANT

## 2020-04-21 PROCEDURE — 99232 SBSQ HOSP IP/OBS MODERATE 35: CPT | Performed by: NURSE PRACTITIONER

## 2020-04-21 PROCEDURE — 2580000003 HC RX 258: Performed by: PHYSICIAN ASSISTANT

## 2020-04-21 PROCEDURE — 6370000000 HC RX 637 (ALT 250 FOR IP): Performed by: INTERNAL MEDICINE

## 2020-04-21 PROCEDURE — 85049 AUTOMATED PLATELET COUNT: CPT

## 2020-04-21 PROCEDURE — 82962 GLUCOSE BLOOD TEST: CPT

## 2020-04-21 PROCEDURE — 97161 PT EVAL LOW COMPLEX 20 MIN: CPT

## 2020-04-21 PROCEDURE — 80048 BASIC METABOLIC PNL TOTAL CA: CPT

## 2020-04-21 PROCEDURE — 36415 COLL VENOUS BLD VENIPUNCTURE: CPT

## 2020-04-21 RX ADMIN — BUDESONIDE AND FORMOTEROL FUMARATE DIHYDRATE 2 PUFF: 80; 4.5 AEROSOL RESPIRATORY (INHALATION) at 08:40

## 2020-04-21 RX ADMIN — INSULIN LISPRO 1 UNITS: 100 INJECTION, SOLUTION INTRAVENOUS; SUBCUTANEOUS at 08:43

## 2020-04-21 RX ADMIN — LISINOPRIL 40 MG: 20 TABLET ORAL at 08:51

## 2020-04-21 RX ADMIN — DONEPEZIL HYDROCHLORIDE 10 MG: 5 TABLET, FILM COATED ORAL at 08:51

## 2020-04-21 RX ADMIN — Medication 10 ML: at 08:51

## 2020-04-21 RX ADMIN — INSULIN LISPRO 2 UNITS: 100 INJECTION, SOLUTION INTRAVENOUS; SUBCUTANEOUS at 11:59

## 2020-04-21 RX ADMIN — GLIMEPIRIDE 1 MG: 2 TABLET ORAL at 08:51

## 2020-04-21 RX ADMIN — APIXABAN 10 MG: 5 TABLET, FILM COATED ORAL at 08:52

## 2020-04-21 RX ADMIN — BUDESONIDE AND FORMOTEROL FUMARATE DIHYDRATE 2 PUFF: 80; 4.5 AEROSOL RESPIRATORY (INHALATION) at 20:20

## 2020-04-21 RX ADMIN — APIXABAN 10 MG: 5 TABLET, FILM COATED ORAL at 20:20

## 2020-04-21 RX ADMIN — LISINOPRIL 40 MG: 20 TABLET ORAL at 20:19

## 2020-04-21 RX ADMIN — MEMANTINE HYDROCHLORIDE 10 MG: 10 TABLET, FILM COATED ORAL at 20:20

## 2020-04-21 RX ADMIN — MEMANTINE HYDROCHLORIDE 10 MG: 10 TABLET, FILM COATED ORAL at 08:51

## 2020-04-21 RX ADMIN — FENOFIBRATE 160 MG: 160 TABLET ORAL at 08:51

## 2020-04-21 RX ADMIN — GLIMEPIRIDE 2 MG: 2 TABLET ORAL at 17:07

## 2020-04-21 RX ADMIN — Medication 10 ML: at 20:20

## 2020-04-21 ASSESSMENT — PAIN SCALES - GENERAL
PAINLEVEL_OUTOF10: 0

## 2020-04-21 NOTE — PROGRESS NOTES
breaths completed with good technique requiring min cues for follow through in preparation for increased functional endurance for self care tasks. Treatment: Patient educated on techniques for completion of ADL, safe functional transfers and functional mobility. Patient required cues for follow through with proper hand/foot placement, pacing, safety, attention, sequencing and technique in bed mobility, functional transfers, functional mobility and LB dressing in preparation for maximum independence in all self care tasks.      Hand Dominance: Right []  Left []   Strength ROM Additional Info:    RUE  4/5 WFL good  and FMC/dexterity noted during ADL tasks     LUE 4/5 WFL good  and FMC/dexterity noted during ADL tasks         Hearing: WFL   Vision: WFL   Sensation:  No c/o numbness or tingling   Tone: WFL   Edema: none                             Long Term Goal (1-3 wks): Pt will maximize functional performance in all self care tasks/functional transfers with good follow through of all trained techniques for safe transition to next level of care    Eval Complexity: Low    Assessment of current deficits   Functional mobility [x]  ADLs [x] Strength [x]  Cognition []  Functional transfers  [x] IADLs [x] Safety Awareness [x]  Endurance [x]  Fine Motor Coordination [] Balance [x] Vision/perception [] Sensation []   Gross Motor Coordination [] ROM [] Delirium []                  Motor Control []    Plan of Care:   ADL retraining [x]   Equipment needs [x]   Neuromuscular re-education [x] Energy Conservation Techniques [x]  Functional Transfer training [x] Patient and/or Family Education [x]  Functional Mobility training [x]  Environmental Modifications [x]  Cognitive re-training []   Compensatory techniques for ADLs [x]  Splinting Needs []   Positioning to improve overall function [x]   Therapeutic Activity [x]   Therapeutic Exercise  [x]  Visual/Perceptual: []    Delirium prevention/treatment  []   Other:

## 2020-04-21 NOTE — PROGRESS NOTES
Physical Therapy    Facility/Department: [address] INTERMEDIATE  Initial Assessment    NAME: Ariana Salter  : 1934  MRN: [id number]    Date of Service: 2020       REQUIRES PT FOLLOW UP: Yes       Patient Diagnosis(es): The primary encounter diagnosis was Multiple subsegmental pulmonary emboli without acute cor pulmonale. Diagnoses of Shortness of breath and Suspected COVID-19 virus infection were also pertinent to this visit. has a past medical history of Hyperlipidemia and Pericarditis. has a past surgical history that includes Vasectomy and joint replacement. Evaluating Therapist: Josefina Andujar PT     Referring Provider:  RITA Roth    Room #: 357   DIAGNOSIS: acute pulm embolism   PRECAUTIONS: falls    Social:  Pt lives alone  in a  2 floor plan     Prior to admission pt walked with  No AD      Initial Evaluation  Date:  2020 Treatment      Short Term/ Long Term   Goals   Was pt agreeable to Eval/treatment? yes      Does pt have pain?  none reported      Bed Mobility  Rolling: independent   Supine to sit:  S/I   Sit to supine:  NT   Scooting:  Independent in sit    independent    Transfers Sit to stand:  SBA   Stand to sit:  SBA   Stand pivot:  S/SBA    independent    Ambulation     60  feet with no AD  with  Supervision    150 feet with  No AD  with  Independent        Stair negotiation: ascended and descended   NT   4-12  steps with  1 rail with S/I    LE ROM  WFL     LE strength  WFL      AM- PAC RAW score  18/ 24            Pt is alert and Oriented x  3. Pt with h/o dementia. Decreased STM per RN      Balance: Supervision   Bed/Chair alarm: yes      ASSESSMENT  Pt displays functional ability as noted in the objective portion of this evaluation. Treatment/Education:    Pulse ox on RA at rest 93%, decreased to 88%, recovered to 95%.  Issued JUANCARLOS CHRISTIAN [facility], Performed x 10     Pt educated on fall risk, PT POC        Patient response to education:   Pt verbalized understanding

## 2020-04-21 NOTE — PROGRESS NOTES
Pulmonary Progress Note    Admit Date: 2020                            PCP: Roxanne Bonilla MD  Principal Problem:    Acute pulmonary embolism without acute cor pulmonale (HCC)  Resolved Problems:    * No resolved hospital problems. *      Subjective:  Sitting up in bedside chair on 2.5L NC. Denies dyspnea or cough. Feels pretty good. Medications:   heparin (porcine) Stopped (20)    dextrose      dextrose          apixaban  10 mg Oral BID    lisinopril  40 mg Oral BID    donepezil  10 mg Oral Daily    fenofibrate  160 mg Oral Daily    glimepiride  1 mg Oral Daily with breakfast    glimepiride  2 mg Oral Dinner    memantine  10 mg Oral BID    sodium chloride flush  10 mL Intravenous 2 times per day    insulin lispro  0-6 Units Subcutaneous TID WC    insulin lispro  0-3 Units Subcutaneous Nightly    budesonide-formoterol  2 puff Inhalation BID       Vitals:  VITALS:  BP (!) 150/80 Comment: manual  Pulse 82   Temp 97.7 °F (36.5 °C) (Infrared)   Resp 18   Ht 5' 7\" (1.702 m)   Wt 249 lb (112.9 kg)   SpO2 94%   BMI 39.00 kg/m²   24HR INTAKE/OUTPUT:      Intake/Output Summary (Last 24 hours) at 2020 0913  Last data filed at 2020 1645  Gross per 24 hour   Intake 417.51 ml   Output 450 ml   Net -32.49 ml     CURRENT PULSE OXIMETRY:  SpO2: 94 %  24HR PULSE OXIMETRY RANGE:  SpO2  Av.8 %  Min: 92 %  Max: 94 %  CVP:    VENT SETTINGS:   Vent Information  SpO2: 94 %  Additional Respiratory  Assessments  Pulse: 82  Resp: 18  SpO2: 94 %      EXAM:  General: Alert, in NAD  ENT: No discharge. Pharynx clear. membranes moist   Neck: Supple, Trachea midline. Resp: No accessory muscle use. Non-labored. Lungs clear diminished bases No crackles. No wheezing. No rhonchi. CV: Regular rate. Regular rhythm. No murmur . Trace ankle  edema. ABD: Non-tender. Softly -distended. round . Normal bowel sounds. Skin: Warm and dry. M/S: No cyanosis. No joint deformity. No clubbing. hiatal hernia is present. Degenerative spondylosis, increased kyphosis and facet arthropathy are identified in the thoracic spine. Skeletal structures are negative for evidence of aggressive process or acute fracture. Pulmonary embolism bilateral upper lobes, the right middle lobe and the bilateral lower lobes. Largest thrombus is within the distal right main pulmonary artery which almost occludes this vessel. I discussed these findings with Dr. Jessica Blake the emergency room attending. 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 The coronal and sagittal views do not confirm this as acute dissection. I discussed these findings with Dr. Jessica Blake the emergency room attending. 14 millimeter left lower lobe nodule which is somewhat suspicious. A follow-up CT of the chest in a few months is recommended. Diffuse interstitial lung disease and peripheral honeycombing. Scattered patchy groundglass densities may reflect manifestations of interstitial disease or acute infection.                    ASSESSMENT:  1.) B/L Upper Lobe Pulmonary Embolism   2.) Suspected COVID-19 Viral Pneumonia (negative test)  3.) H/O Aortic Arch Dissection   4.) H/O Pericarditis   5.) 1.4 cm LLL Lung Nodule   6.) Interstitial Disease with Peripheral Honeycombing - suspect underlying degree fo IPF or GERD  7.) Acute Kidney Injury      PLAN:  1.) COVID-19 PCR (-)  2.) heparin drip changed   to Apixiban. Will need anticoagulation x 6 months if not contraindicated. Not a good candidate for lifelong anticoagulation due to h/o Dementia  3.) O2 currently on 2.5L. Keep POX 90-95%, wean down as able  4.  Home o2 eval in am, please obtain a walking oximetry   4.) consider to update screening work up for malignancy based on age and gender.       Electronically signed by ADALBERTO Spears on 4/21/2020 at 9:13 AM     Seen and evaluated with above A/P  88% room air with exertion  Tolerates Eliquis so far

## 2020-04-21 NOTE — CARE COORDINATION
Social work / Discharge Planning:       Patient is COVID NEGATIVE. Per chart notes yesterday, a referral was made to [facility] and they are not accepting referrals. A referral was also made to [facility]. Awaiting therapy evaluations to assist in determining needs for discharge regarding level of care.   Electronically signed by BALDEV Rivas on 4/21/2020 at 9:42 AM

## 2020-04-21 NOTE — PROGRESS NOTES
[address] [place], [place] [address]ist   Progress Note    Admitting Date and Time: 4/19/2020 12:39 PM  Admit Dx: Acute pulmonary embolism without acute cor pulmonale, unspecified pulmonary embolism type (HCC) [I26.99]  Acute pulmonary embolism without acute cor pulmonale, unspecified pulmonary embolism type ([address] [I26.99]    Subjective:    Pt feels good this morning. Patient states he is feeling much better this morning. Patient denies SOB, N/V/D. Patient is sitting up in the chair. She remains on 2 LNC tolerating well does not wear oxygen at home continue to wean as tolerated. Family requesting SNF placement due to worsening dementia. Awaiting PT evaluation. Per RN: No new concerns. ROS: denies fever, chills, cp, sob, n/v, HA unless stated above.      apixaban  10 mg Oral BID    lisinopril  40 mg Oral BID    donepezil  10 mg Oral Daily    fenofibrate  160 mg Oral Daily    glimepiride  1 mg Oral Daily with breakfast    glimepiride  2 mg Oral Dinner    memantine  10 mg Oral BID    sodium chloride flush  10 mL Intravenous 2 times per day    insulin lispro  0-6 Units Subcutaneous TID WC    insulin lispro  0-3 Units Subcutaneous Nightly    budesonide-formoterol  2 puff Inhalation BID     heparin (porcine), 80 Units/kg, PRN  heparin (porcine), 40 Units/kg, PRN  sodium chloride flush, 10 mL, PRN  potassium chloride, 40 mEq, PRN    Or  potassium alternative oral replacement, 40 mEq, PRN    Or  potassium chloride, 10 mEq, PRN  acetaminophen, 650 mg, Q6H PRN    Or  acetaminophen, 650 mg, Q6H PRN  magnesium hydroxide, 30 mL, Daily PRN  promethazine, 12.5 mg, Q6H PRN    Or  ondansetron, 4 mg, Q6H PRN  glucose, 15 g, PRN  dextrose, 12.5 g, PRN  glucagon (rDNA), 1 mg, PRN  dextrose, 100 mL/hr, PRN  glucose, 15 g, PRN  dextrose, 12.5 g, PRN  glucagon (rDNA), 1 mg, PRN  dextrose, 100 mL/hr, PRN         Objective:    BP (!) 150/80 Comment: manual  Pulse 82   Temp 97.7 °F (36.5 °C) (Infrared)   Resp 18 Ht 5' 7\" (1.702 m)   Wt 249 lb (112.9 kg)   SpO2 94%   BMI 39.00 kg/m²   General Appearance: alert and oriented to person, place and time and in no acute distress  Skin: warm and dry  Head: normocephalic and atraumatic  Eyes: pupils equal, round, and reactive to light, extraocular eye movements intact, conjunctivae normal  Neck: neck supple and non tender without mass   Pulmonary/Chest: clear to auscultation bilaterally- no wheezes, rales or rhonchi, normal air movement, no respiratory distress  Cardiovascular: normal rate, normal S1 and S2 no rubs, gallops, murmur noted  Abdomen: soft, non-tender, non-distended, normal bowel sounds, no masses or organomegaly  Extremities: no cyanosis, no clubbing and no edema  Neurologic: no cranial nerve deficit and speech normal      Recent Labs     04/19/20  1312 04/20/20  0550 04/21/20  0615    143 141   K 3.8 3.7 3.5   CL 99 104 103   CO2 25 23 26   BUN 16 16 16   CREATININE 1.5* 1.2 1.4*   GLUCOSE 232* 184* 155*   CALCIUM 9.1 9.0 8.8       Recent Labs     04/19/20  1312 04/20/20  0550 04/21/20  0615   WBC 8.5 9.2  --    RBC 5.00 4.96  --    HGB 15.3 15.1  --    HCT 46.6 46.0  --    MCV 93.2 92.7  --    MCH 30.6 30.4  --    MCHC 32.8 32.8  --    RDW 13.9 13.9  --     248 260   MPV 11.5 11.3  --            Radiology:   CTA CHEST W CONTRAST   Final Result      Pulmonary embolism bilateral upper lobes, the right middle lobe and   the bilateral lower lobes. Largest thrombus is within the distal right   main pulmonary artery which almost occludes this vessel. I discussed   these findings with Dr. Aye Mcgowan the emergency room attending. 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 The coronal   and sagittal views do not confirm this as acute dissection. I   discussed these findings with Dr. Aye Mcgowan the emergency room   attending. 14 millimeter left lower lobe nodule which is somewhat suspicious.  A   follow-up CT of the chest in a few months is recommended. Diffuse interstitial lung disease and peripheral honeycombing. Scattered patchy groundglass densities may reflect manifestations of   interstitial disease or acute infection. XR CHEST PORTABLE   Final Result   No definite evidence of acute cardiopulmonary pathology. Cardiomegaly without decompensation. Equivocal and minimal dependent interstitial density in both lungs   versus overlying chest wall artifact. Assessment:  Principal Problem:    Acute pulmonary embolism without acute cor pulmonale (HCC)  Resolved Problems:    * No resolved hospital problems. *      Plan: 1. Acute pulmonary emboli (bilateral upper lobe) without acute cor pulmonale-Heparin gtt transition to apixaban. Pulmonary input appreciated. Patient continues to require oxygen supplementation remains on 2 LNC. Will titrate as tolerated maintain SPO2> 92%. 2.COVID-19 infection - patient without fever/cough. 4/19/2020 COVID-19 not detected. Plaquenil DC'd. Droplet of plus precautions  3. HTN- continue lisinopril  4. DM II-A1c 8.6. Continue glimepiride. SSI. Carb controlled diet. Hypoglycemic protocol  5. ETHAN- resolved BUN/Crt 16/1.5. Today 16/1.2 continue to follow  6. Dementia-continue Aricept  7. HLD- continue fenofibrate. 8.  Disposition- awaiting placement to SNF/AL. Patient unable to return home 2/2 worsening dementia. Case management working on [facility] at [address] [place] [address] PT evaluation. NOTE: This report was transcribed using voice recognition software. Every effort was made to ensure accuracy; however, inadvertent computerized transcription errors may be present.     Electronically signed by Salome Suazo.  Kelvin Reynolds CNP on 4/21/2020 at 12:52 PM

## 2020-04-22 PROBLEM — I26.94 MULTIPLE SUBSEGMENTAL PULMONARY EMBOLI WITHOUT ACUTE COR PULMONALE (HCC): Status: ACTIVE | Noted: 2020-04-19

## 2020-04-22 LAB
ANION GAP SERPL CALCULATED.3IONS-SCNC: 13 MMOL/L (ref 7–16)
BUN BLDV-MCNC: 16 MG/DL (ref 8–23)
CALCIUM SERPL-MCNC: 9.5 MG/DL (ref 8.6–10.2)
CHLORIDE BLD-SCNC: 99 MMOL/L (ref 98–107)
CO2: 27 MMOL/L (ref 22–29)
CREAT SERPL-MCNC: 1.2 MG/DL (ref 0.7–1.2)
EKG ATRIAL RATE: 76 BPM
EKG P AXIS: 59 DEGREES
EKG Q-T INTERVAL: 444 MS
EKG QRS DURATION: 114 MS
EKG QTC CALCULATION (BAZETT): 439 MS
EKG R AXIS: -43 DEGREES
EKG T AXIS: 12 DEGREES
EKG VENTRICULAR RATE: 59 BPM
GFR AFRICAN AMERICAN: >60
GFR NON-AFRICAN AMERICAN: 57 ML/MIN/1.73
GLUCOSE BLD-MCNC: 243 MG/DL (ref 74–99)
METER GLUCOSE: 106 MG/DL (ref 74–99)
METER GLUCOSE: 145 MG/DL (ref 74–99)
METER GLUCOSE: 163 MG/DL (ref 74–99)
METER GLUCOSE: 165 MG/DL (ref 74–99)
PHOSPHORUS: 3.5 MG/DL (ref 2.5–4.5)
POTASSIUM REFLEX MAGNESIUM: 3.7 MMOL/L (ref 3.5–5)
SODIUM BLD-SCNC: 139 MMOL/L (ref 132–146)

## 2020-04-22 PROCEDURE — 99222 1ST HOSP IP/OBS MODERATE 55: CPT | Performed by: INTERNAL MEDICINE

## 2020-04-22 PROCEDURE — 6370000000 HC RX 637 (ALT 250 FOR IP): Performed by: INTERNAL MEDICINE

## 2020-04-22 PROCEDURE — 36415 COLL VENOUS BLD VENIPUNCTURE: CPT

## 2020-04-22 PROCEDURE — APPSS60 APP SPLIT SHARED TIME 46-60 MINUTES: Performed by: NURSE PRACTITIONER

## 2020-04-22 PROCEDURE — 82962 GLUCOSE BLOOD TEST: CPT

## 2020-04-22 PROCEDURE — 99232 SBSQ HOSP IP/OBS MODERATE 35: CPT | Performed by: NURSE PRACTITIONER

## 2020-04-22 PROCEDURE — 84100 ASSAY OF PHOSPHORUS: CPT

## 2020-04-22 PROCEDURE — 2580000003 HC RX 258: Performed by: PHYSICIAN ASSISTANT

## 2020-04-22 PROCEDURE — 2060000000 HC ICU INTERMEDIATE R&B

## 2020-04-22 PROCEDURE — 93010 ELECTROCARDIOGRAM REPORT: CPT | Performed by: INTERNAL MEDICINE

## 2020-04-22 PROCEDURE — 97530 THERAPEUTIC ACTIVITIES: CPT

## 2020-04-22 PROCEDURE — 80048 BASIC METABOLIC PNL TOTAL CA: CPT

## 2020-04-22 PROCEDURE — 6370000000 HC RX 637 (ALT 250 FOR IP): Performed by: PHYSICIAN ASSISTANT

## 2020-04-22 PROCEDURE — 97535 SELF CARE MNGMENT TRAINING: CPT

## 2020-04-22 PROCEDURE — 2700000000 HC OXYGEN THERAPY PER DAY

## 2020-04-22 RX ADMIN — APIXABAN 10 MG: 5 TABLET, FILM COATED ORAL at 20:39

## 2020-04-22 RX ADMIN — FENOFIBRATE 160 MG: 160 TABLET ORAL at 08:42

## 2020-04-22 RX ADMIN — BUDESONIDE AND FORMOTEROL FUMARATE DIHYDRATE 2 PUFF: 80; 4.5 AEROSOL RESPIRATORY (INHALATION) at 20:39

## 2020-04-22 RX ADMIN — BUDESONIDE AND FORMOTEROL FUMARATE DIHYDRATE 2 PUFF: 80; 4.5 AEROSOL RESPIRATORY (INHALATION) at 08:41

## 2020-04-22 RX ADMIN — APIXABAN 10 MG: 5 TABLET, FILM COATED ORAL at 08:41

## 2020-04-22 RX ADMIN — MEMANTINE HYDROCHLORIDE 10 MG: 10 TABLET, FILM COATED ORAL at 20:39

## 2020-04-22 RX ADMIN — GLIMEPIRIDE 2 MG: 2 TABLET ORAL at 18:21

## 2020-04-22 RX ADMIN — DONEPEZIL HYDROCHLORIDE 10 MG: 5 TABLET, FILM COATED ORAL at 08:42

## 2020-04-22 RX ADMIN — GLIMEPIRIDE 1 MG: 2 TABLET ORAL at 08:42

## 2020-04-22 RX ADMIN — MEMANTINE HYDROCHLORIDE 10 MG: 10 TABLET, FILM COATED ORAL at 08:41

## 2020-04-22 RX ADMIN — LISINOPRIL 40 MG: 20 TABLET ORAL at 08:42

## 2020-04-22 RX ADMIN — LISINOPRIL 40 MG: 20 TABLET ORAL at 20:39

## 2020-04-22 RX ADMIN — INSULIN LISPRO 1 UNITS: 100 INJECTION, SOLUTION INTRAVENOUS; SUBCUTANEOUS at 20:40

## 2020-04-22 RX ADMIN — INSULIN LISPRO 1 UNITS: 100 INJECTION, SOLUTION INTRAVENOUS; SUBCUTANEOUS at 08:33

## 2020-04-22 RX ADMIN — Medication 10 ML: at 22:00

## 2020-04-22 RX ADMIN — INSULIN LISPRO 1 UNITS: 100 INJECTION, SOLUTION INTRAVENOUS; SUBCUTANEOUS at 12:50

## 2020-04-22 RX ADMIN — Medication 10 ML: at 08:30

## 2020-04-22 ASSESSMENT — PAIN SCALES - GENERAL
PAINLEVEL_OUTOF10: 0

## 2020-04-22 NOTE — PROGRESS NOTES
today  4.) consider to update screening work up for malignancy based on age and gender.       Electronically signed by ADALBERTO Gao on 4/22/2020 at 9:01 AM

## 2020-04-22 NOTE — PROGRESS NOTES
Physical Therapy    Facility/Department: [address] INTERMEDIATE  Treatment note    NAME: Juwan Gamez  : 1934  MRN: [id number]    Date of Service: 2020               Patient Diagnosis(es): The primary encounter diagnosis was Multiple subsegmental pulmonary emboli without acute cor pulmonale. Diagnoses of Shortness of breath and Suspected COVID-19 virus infection were also pertinent to this visit. has a past medical history of Hyperlipidemia and Pericarditis. has a past surgical history that includes Vasectomy and joint replacement. Evaluating Therapist: Helio Bustos PT     Referring Provider:  RITA Donohue    Room #: 797   DIAGNOSIS: acute pulm embolism   PRECAUTIONS: falls    Social:  Pt lives alone  in a  2 floor plan     Prior to admission pt walked with  No AD      Initial Evaluation  Date:  2020 Treatment  2020    Short Term/ Long Term   Goals   Was pt agreeable to Eval/treatment? yes  yes    Does pt have pain?  none reported  No complaints    Bed Mobility  Rolling: independent   Supine to sit:  S/I   Sit to supine:  NT   Scooting:  Independent in sit  NT  independent    Transfers Sit to stand:  SBA   Stand to sit:  SBA   Stand pivot:  S/SBA  Sit <> stand SBA  independent    Ambulation     60  feet with no AD  with  Supervision  80 feet, 25 feet x 1 without A/D SBA  150 feet with  No AD  with  Independent        Stair negotiation: ascended and descended   NT  NT 4-12  steps with  1 rail with S/I    LE ROM  WFL     LE strength  WFL      AM- PAC RAW score          Pt is alert and able to follow instruction  Balance: fair dynamic without A/d    Patient education  Pt was educated on breathing technique    Patient response to education:   Pt verbalized understanding Pt demonstrated skill Pt requires further education in this area   yes yes yes     ASSESSMENT:   Comments: Nurse ok with rx. Pt found in a bedside chair, agreed to walk. Pt on 2 L 02 NC all Rx. 02 saturation at rest 92%, 92% during and immediately after seated, dropped to 87% after seated approx 90 seconds, then with approx 30 second recovery to 92%. Pt then assisted to the sink and back, stood SBA to wash hands, 02 saturation 92% after this activity. No loss of balance or unsteadiness occurred during all functional mobility. Treatment: Pt practiced and was instructed in the following treatment: breathing to promote increased 02 saturation. Pt was left in a bedside chair with call light in reach. Chair/bed alarm: chair alarm active    Time in 1303   Time out 1328   Total Treatment Time 25 minutes   CPT codes:     Therapeutic activities 52875 25 minutes   Therapeutic exercises 07944 0 minutes       Pt is making good progress toward established Physical Therapy goals as per functional mobility performed. Continue with physical therapy current plan of care.     Alo Wis[facility]   License Number: [id number]

## 2020-04-22 NOTE — PROGRESS NOTES
[facility] Progress Note    Admitting Date and Time: 4/19/2020 12:39 PM  Admit Dx: Acute pulmonary embolism without acute cor pulmonale, unspecified pulmonary embolism type (HCC) [I26.99]  Acute pulmonary embolism without acute cor pulmonale, unspecified pulmonary embolism type (HCC) [I26.99]    Subjective:  Patient is being followed for Acute pulmonary embolism without acute cor pulmonale, unspecified pulmonary embolism type ([address] [I26.99]  Acute pulmonary embolism without acute cor pulmonale, unspecified pulmonary embolism type ([facility] [I26.99]       Patient awake, alert, sitting up in chair in room in no acute distress  Denies sob  Denies pain  On 2 L NC  Intermittent confusion per nursing     ROS: denies fever, chills, cp, sob, n/v, HA unless stated above.       apixaban  10 mg Oral BID    lisinopril  40 mg Oral BID    donepezil  10 mg Oral Daily    fenofibrate  160 mg Oral Daily    glimepiride  1 mg Oral Daily with breakfast    glimepiride  2 mg Oral Dinner    memantine  10 mg Oral BID    sodium chloride flush  10 mL Intravenous 2 times per day    insulin lispro  0-6 Units Subcutaneous TID WC    insulin lispro  0-3 Units Subcutaneous Nightly    budesonide-formoterol  2 puff Inhalation BID     heparin (porcine), 80 Units/kg, PRN  heparin (porcine), 40 Units/kg, PRN  sodium chloride flush, 10 mL, PRN  potassium chloride, 40 mEq, PRN    Or  potassium alternative oral replacement, 40 mEq, PRN    Or  potassium chloride, 10 mEq, PRN  acetaminophen, 650 mg, Q6H PRN    Or  acetaminophen, 650 mg, Q6H PRN  magnesium hydroxide, 30 mL, Daily PRN  promethazine, 12.5 mg, Q6H PRN    Or  ondansetron, 4 mg, Q6H PRN  glucose, 15 g, PRN  dextrose, 12.5 g, PRN  glucagon (rDNA), 1 mg, PRN  dextrose, 100 mL/hr, PRN  glucose, 15 g, PRN  dextrose, 12.5 g, PRN  glucagon (rDNA), 1 mg, PRN  dextrose, 100 mL/hr, PRN         Objective:    /78 Comment: manual  Pulse 80   Temp 97.8 °F (36.6 °C) (Infrared) mobitz type I, first degree AV block- per cardiology. Avoid calcium channel blockers/ beta blockers/     Dispo: social work following - looking into SNF      NOTE: This report was transcribed using voice recognition software. Every effort was made to ensure accuracy; however, inadvertent computerized transcription errors may be present.   Electronically signed by CARLOS MANUEL Archibald on 4/22/2020 at 3:48 PM

## 2020-04-22 NOTE — PROGRESS NOTES
Occupational Therapy  OT BEDSIDE TREATMENT NOTE      Date:2020  Patient Name: Rafa Barragan  MRN: [id number]  : 1934  Room: [address] [place] [address]     Treatment requested this date by nurse and  20     Referring Provider: RITA Valenzuela     Evaluating OT: Duane Herrera OTR/L [id number]     AM-PAC Daily Activity Raw Score: 18/24     Recommended Adaptive Equipment: TBD     Diagnosis: Acute PE. Pt presents to ED from home with SOB. Pertinent Medical History: pericarditis   Precautions:  Falls, droplet plus isolation COVID test negative, O2     Home Living: PLOF per pt report, per nursing pt has trouble with STM, possible questionable historian. Pt lives alone in a 2 story home with B/B 2nd floor, 1/2 bath 1st floor. Bathroom setup: tub/shower combo, standard commode      Prior Level of Function: Independent with ADLs, daughter assists PRN with IADLs; completed functional mobility with no AD  Driving: Yes?     Pain Level: no reported pain     Cognition: A&O: 3/4. Pt is oriented to self, hospital and temporal concepts. Confusion regarding situation. Pleasant and cooperative throughout.               Problem solving:  fair              Judgement/safety:  poor   Pt has difficulty in STM during session and is easily confused                Functional Assessment:    Initial Eval Status  Date: 20 Treatment session:   20 Short Term Goals  Treatment frequency: 2-5x/wk PRN x1-3 wks      Feeding Independent       Grooming SBA  SBA  Standing sink level for hand hygiene Mod I   UB Dressing Set up   Independent   LB Dressing Min A  Min A  Management of B socks seated in armchair and donning/doffing B slip on shoes  Increased time and cues required throughout for safe breathing techniques due to SOB with forward trunk flexion Mod I    Bathing Mod A   Mod I   Toileting Min A   Mod I   Bed Mobility  Supine to sit: SBA       Functional Transfers STS: SBA STS: SBA  Mod I   Functional Mobility Supervision no AD  Household distance SBA with no AD  Household distance x2  2 standing rest breaks  Education on self management of O2 cord requiring min to mod cues throughout for safe O2 cord management  Mod I during ADLs   Balance Sitting: fair     Standing: fair no AD  standing: fair no AD  Standing tolerance x4 min prior to required rest break secondary to SOB     Activity Tolerance Fair minus. 2.5L O2 restin%, post activity: 88% min recovery to 94% Fair minus. 2L O2 restin%  With activity: 87%  Min recovery time to 91-92%  Education on pursed lip breathing min cues for follow through  standing fabian x6-7 min with fair plus balance during self care tasks     ADL comments: Pt has difficulty in short term memory and with changes to his routine/schedule. Pt requires multiple cues and redirection to new training and tasks for follow through. Questionable safety and cognitive deficits with new demand for supplemental O2 limiting safety and ability to function alone in home environment. Comments: Upon arrival pt seated in armchair. At end of session seated in armchair all lines and tubes intact, call light and phone within reach. Bed/chair alarm: ON    · Pt has made progress towards set goals.    · Continue with current plan of care    Treatment Time In:1305  Treatment Time Out: 0869  Treatment Charges: Mins Units    ADL/Home Mgt 60030 18 2    Thera Activities [id number] 6     Ther Ex [id number]      Manual Therapy [id number]      Neuro Re-ed [id number]      Orthotic manage/training  04739      Non Billable Time      Total Timed Treatment 24 2        Tessie Cherry OTR/L  [id number]

## 2020-04-23 LAB
ANION GAP SERPL CALCULATED.3IONS-SCNC: 13 MMOL/L (ref 7–16)
BUN BLDV-MCNC: 16 MG/DL (ref 8–23)
CALCIUM SERPL-MCNC: 9.2 MG/DL (ref 8.6–10.2)
CHLORIDE BLD-SCNC: 100 MMOL/L (ref 98–107)
CO2: 26 MMOL/L (ref 22–29)
CREAT SERPL-MCNC: 1.2 MG/DL (ref 0.7–1.2)
GFR AFRICAN AMERICAN: >60
GFR NON-AFRICAN AMERICAN: 57 ML/MIN/1.73
GLUCOSE BLD-MCNC: 253 MG/DL (ref 74–99)
LV EF: 63 %
LVEF MODALITY: NORMAL
Lab: NORMAL
METER GLUCOSE: 146 MG/DL (ref 74–99)
METER GLUCOSE: 150 MG/DL (ref 74–99)
METER GLUCOSE: 175 MG/DL (ref 74–99)
METER GLUCOSE: 183 MG/DL (ref 74–99)
PLATELET # BLD: 308 E9/L (ref 130–450)
POTASSIUM REFLEX MAGNESIUM: 3.9 MMOL/L (ref 3.5–5)
REASON FOR REJECTION: NORMAL
REJECTED TEST: NORMAL
REPORT: NORMAL
SODIUM BLD-SCNC: 139 MMOL/L (ref 132–146)
THIS TEST SENT TO: NORMAL

## 2020-04-23 PROCEDURE — 2060000000 HC ICU INTERMEDIATE R&B

## 2020-04-23 PROCEDURE — 6370000000 HC RX 637 (ALT 250 FOR IP): Performed by: INTERNAL MEDICINE

## 2020-04-23 PROCEDURE — 6370000000 HC RX 637 (ALT 250 FOR IP): Performed by: PHYSICIAN ASSISTANT

## 2020-04-23 PROCEDURE — 99232 SBSQ HOSP IP/OBS MODERATE 35: CPT | Performed by: NURSE PRACTITIONER

## 2020-04-23 PROCEDURE — 36415 COLL VENOUS BLD VENIPUNCTURE: CPT

## 2020-04-23 PROCEDURE — 99232 SBSQ HOSP IP/OBS MODERATE 35: CPT | Performed by: INTERNAL MEDICINE

## 2020-04-23 PROCEDURE — 85049 AUTOMATED PLATELET COUNT: CPT

## 2020-04-23 PROCEDURE — 2580000003 HC RX 258: Performed by: PHYSICIAN ASSISTANT

## 2020-04-23 PROCEDURE — 82962 GLUCOSE BLOOD TEST: CPT

## 2020-04-23 PROCEDURE — 93306 TTE W/DOPPLER COMPLETE: CPT

## 2020-04-23 PROCEDURE — 80048 BASIC METABOLIC PNL TOTAL CA: CPT

## 2020-04-23 RX ADMIN — LISINOPRIL 40 MG: 20 TABLET ORAL at 08:43

## 2020-04-23 RX ADMIN — INSULIN LISPRO 1 UNITS: 100 INJECTION, SOLUTION INTRAVENOUS; SUBCUTANEOUS at 08:34

## 2020-04-23 RX ADMIN — GLIMEPIRIDE 2 MG: 2 TABLET ORAL at 16:59

## 2020-04-23 RX ADMIN — FENOFIBRATE 160 MG: 160 TABLET ORAL at 08:43

## 2020-04-23 RX ADMIN — Medication 10 ML: at 08:43

## 2020-04-23 RX ADMIN — DONEPEZIL HYDROCHLORIDE 10 MG: 5 TABLET, FILM COATED ORAL at 08:34

## 2020-04-23 RX ADMIN — BUDESONIDE AND FORMOTEROL FUMARATE DIHYDRATE 2 PUFF: 80; 4.5 AEROSOL RESPIRATORY (INHALATION) at 21:36

## 2020-04-23 RX ADMIN — INSULIN LISPRO 1 UNITS: 100 INJECTION, SOLUTION INTRAVENOUS; SUBCUTANEOUS at 13:50

## 2020-04-23 RX ADMIN — INSULIN LISPRO 1 UNITS: 100 INJECTION, SOLUTION INTRAVENOUS; SUBCUTANEOUS at 21:37

## 2020-04-23 RX ADMIN — MEMANTINE HYDROCHLORIDE 10 MG: 10 TABLET, FILM COATED ORAL at 08:43

## 2020-04-23 RX ADMIN — BUDESONIDE AND FORMOTEROL FUMARATE DIHYDRATE 2 PUFF: 80; 4.5 AEROSOL RESPIRATORY (INHALATION) at 08:43

## 2020-04-23 RX ADMIN — MEMANTINE HYDROCHLORIDE 10 MG: 10 TABLET, FILM COATED ORAL at 21:35

## 2020-04-23 RX ADMIN — INSULIN LISPRO 1 UNITS: 100 INJECTION, SOLUTION INTRAVENOUS; SUBCUTANEOUS at 17:29

## 2020-04-23 RX ADMIN — APIXABAN 10 MG: 5 TABLET, FILM COATED ORAL at 21:35

## 2020-04-23 RX ADMIN — LISINOPRIL 40 MG: 20 TABLET ORAL at 21:35

## 2020-04-23 RX ADMIN — Medication 10 ML: at 21:36

## 2020-04-23 RX ADMIN — APIXABAN 10 MG: 5 TABLET, FILM COATED ORAL at 08:43

## 2020-04-23 RX ADMIN — GLIMEPIRIDE 1 MG: 2 TABLET ORAL at 08:43

## 2020-04-23 ASSESSMENT — PAIN SCALES - GENERAL
PAINLEVEL_OUTOF10: 0
PAINLEVEL_OUTOF10: 0

## 2020-04-23 NOTE — PROGRESS NOTES
blockers/ beta blockers. Echo pending.      Dispo: social work following - looking into SNF. Anticipate discharge soon          NOTE: This report was transcribed using voice recognition software. Every effort was made to ensure accuracy; however, inadvertent computerized transcription errors may be present.   Electronically signed by CARLOS MANUEL Miranda on 4/23/2020 at 4:17 PM

## 2020-04-23 NOTE — PROGRESS NOTES
(NAMENDA) tablet 10 mg  10 mg Oral BID Terrell Dunn, PA   10 mg at 04/23/20 0843    sodium chloride flush 0.9 % injection 10 mL  10 mL Intravenous 2 times per day Lamines Mona, PA   10 mL at 04/23/20 0843    sodium chloride flush 0.9 % injection 10 mL  10 mL Intravenous PRN Delmurphys Shell, PA        potassium chloride (KLOR-CON M) extended release tablet 40 mEq  40 mEq Oral PRN Delmurphys Shell, PA        Or    potassium bicarb-citric acid (EFFER-K) effervescent tablet 40 mEq  40 mEq Oral PRN Dellis Shell, PA        Or    potassium chloride 10 mEq/100 mL IVPB (Peripheral Line)  10 mEq Intravenous PRN Delmurphys Shell, PA        acetaminophen (TYLENOL) tablet 650 mg  650 mg Oral Q6H PRN Lamines Shell, PA   650 mg at 04/20/20 1652    Or    acetaminophen (TYLENOL) suppository 650 mg  650 mg Rectal Q6H PRN Lamines Shell, PA        magnesium hydroxide (MILK OF MAGNESIA) 400 MG/5ML suspension 30 mL  30 mL Oral Daily PRN Lamines Shell, PA        promethazine (PHENERGAN) tablet 12.5 mg  12.5 mg Oral Q6H PRN Lamines Mona PA        Or    ondansetron (ZOFRAN) injection 4 mg  4 mg Intravenous Q6H PRN Delmurphys Shell, PA        glucose (GLUTOSE) 40 % oral gel 15 g  15 g Oral PRN Terrell Shell, PA        dextrose 50 % IV solution  12.5 g Intravenous PRN Lamines Shell, PA        glucagon (rDNA) injection 1 mg  1 mg Intramuscular PRN Delmurphys Shell, PA        dextrose 5 % solution  100 mL/hr Intravenous PRN Lamines Shell, PA        insulin lispro (HUMALOG) injection vial 0-6 Units  0-6 Units Subcutaneous TID WC Lamines Mona PA   1 Units at 04/22/20 1250    insulin lispro (HUMALOG) injection vial 0-3 Units  0-3 Units Subcutaneous Nightly Terrell Dunn PA   1 Units at 04/22/20 2040    budesonide-formoterol (SYMBICORT) 80-4.5 MCG/ACT inhaler 2 puff  2 puff Inhalation BID Terrell Dunn PA   2 puff at 04/23/20 0843    glucose (GLUTOSE) 40 % oral gel 15 g  15 g Oral TEZ Yu MD

## 2020-04-24 VITALS
BODY MASS INDEX: 39.08 KG/M2 | TEMPERATURE: 97.3 F | OXYGEN SATURATION: 91 % | SYSTOLIC BLOOD PRESSURE: 138 MMHG | HEIGHT: 67 IN | RESPIRATION RATE: 18 BRPM | DIASTOLIC BLOOD PRESSURE: 78 MMHG | HEART RATE: 80 BPM | WEIGHT: 249 LBS

## 2020-04-24 LAB
ANION GAP SERPL CALCULATED.3IONS-SCNC: 11 MMOL/L (ref 7–16)
BUN BLDV-MCNC: 15 MG/DL (ref 8–23)
CALCIUM SERPL-MCNC: 9.2 MG/DL (ref 8.6–10.2)
CHLORIDE BLD-SCNC: 102 MMOL/L (ref 98–107)
CO2: 26 MMOL/L (ref 22–29)
CREAT SERPL-MCNC: 1.4 MG/DL (ref 0.7–1.2)
GFR AFRICAN AMERICAN: 58
GFR NON-AFRICAN AMERICAN: 48 ML/MIN/1.73
GLUCOSE BLD-MCNC: 206 MG/DL (ref 74–99)
METER GLUCOSE: 150 MG/DL (ref 74–99)
METER GLUCOSE: 212 MG/DL (ref 74–99)
METER GLUCOSE: 70 MG/DL (ref 74–99)
METER GLUCOSE: 94 MG/DL (ref 74–99)
POTASSIUM REFLEX MAGNESIUM: 3.9 MMOL/L (ref 3.5–5)
SODIUM BLD-SCNC: 139 MMOL/L (ref 132–146)

## 2020-04-24 PROCEDURE — 99232 SBSQ HOSP IP/OBS MODERATE 35: CPT | Performed by: INTERNAL MEDICINE

## 2020-04-24 PROCEDURE — 6370000000 HC RX 637 (ALT 250 FOR IP): Performed by: INTERNAL MEDICINE

## 2020-04-24 PROCEDURE — 80048 BASIC METABOLIC PNL TOTAL CA: CPT

## 2020-04-24 PROCEDURE — 36415 COLL VENOUS BLD VENIPUNCTURE: CPT

## 2020-04-24 PROCEDURE — 97530 THERAPEUTIC ACTIVITIES: CPT

## 2020-04-24 PROCEDURE — 99232 SBSQ HOSP IP/OBS MODERATE 35: CPT | Performed by: NURSE PRACTITIONER

## 2020-04-24 PROCEDURE — 2580000003 HC RX 258: Performed by: PHYSICIAN ASSISTANT

## 2020-04-24 PROCEDURE — 6370000000 HC RX 637 (ALT 250 FOR IP): Performed by: PHYSICIAN ASSISTANT

## 2020-04-24 PROCEDURE — 82962 GLUCOSE BLOOD TEST: CPT

## 2020-04-24 RX ORDER — GLIMEPIRIDE 2 MG/1
2 TABLET ORAL
Qty: 30 TABLET | Refills: 3 | Status: ON HOLD | DISCHARGE
Start: 2020-04-24 | End: 2021-01-01 | Stop reason: SDUPTHER

## 2020-04-24 RX ORDER — GLIMEPIRIDE 1 MG/1
1 TABLET ORAL
Qty: 30 TABLET | Refills: 3 | DISCHARGE
Start: 2020-04-25 | End: 2020-07-22 | Stop reason: ALTCHOICE

## 2020-04-24 RX ADMIN — APIXABAN 10 MG: 5 TABLET, FILM COATED ORAL at 09:29

## 2020-04-24 RX ADMIN — GLIMEPIRIDE 1 MG: 2 TABLET ORAL at 09:29

## 2020-04-24 RX ADMIN — Medication 10 ML: at 09:28

## 2020-04-24 RX ADMIN — DONEPEZIL HYDROCHLORIDE 10 MG: 5 TABLET, FILM COATED ORAL at 09:29

## 2020-04-24 RX ADMIN — INSULIN LISPRO 2 UNITS: 100 INJECTION, SOLUTION INTRAVENOUS; SUBCUTANEOUS at 12:01

## 2020-04-24 RX ADMIN — GLIMEPIRIDE 2 MG: 2 TABLET ORAL at 17:19

## 2020-04-24 RX ADMIN — INSULIN LISPRO 1 UNITS: 100 INJECTION, SOLUTION INTRAVENOUS; SUBCUTANEOUS at 09:33

## 2020-04-24 RX ADMIN — LISINOPRIL 40 MG: 20 TABLET ORAL at 09:29

## 2020-04-24 RX ADMIN — MEMANTINE HYDROCHLORIDE 10 MG: 10 TABLET, FILM COATED ORAL at 09:29

## 2020-04-24 RX ADMIN — BUDESONIDE AND FORMOTEROL FUMARATE DIHYDRATE 2 PUFF: 80; 4.5 AEROSOL RESPIRATORY (INHALATION) at 09:27

## 2020-04-24 RX ADMIN — FENOFIBRATE 160 MG: 160 TABLET ORAL at 09:29

## 2020-04-24 ASSESSMENT — PAIN SCALES - GENERAL: PAINLEVEL_OUTOF10: 0

## 2020-04-24 NOTE — DISCHARGE SUMMARY
TO IV TEAM    Procedures: none    Hospital Course:   Patient Rafa Barragan is a 80 y.o. presented with Acute pulmonary embolism without acute cor pulmonale, unspecified pulmonary embolism type ([address] [I26.99]  Acute pulmonary embolism without acute cor pulmonale, unspecified pulmonary embolism type ([address] [I26.99]   Patient presented to the ER from home with complaints of sob progressively worsening. Pulmonology and cardiology were consulted. He was treated for;    1.  Acute respiratory failure with hypoxia: likely related to bilateral PE: Appreciate Pulmonology input. Continue to wean 02. Currently weaned to RA.     2. Acute bilateral PE: appreciate pulmonology input. Initially on a heparin gtt- transitioned to eliquis. Per pulm will need anticoagulation x 6 months. Follow up with pulmonology in 1- 2 months. Will need repeat echo and CT imaging. Low leg ultrasound to be completed outpatient.      3. Possible viral pneumonia: COVID 23- neg. Plaquenil stopped. Viral pneumonia ruled out.     4. Concern for interstitial lung diease: pulmonology following.     5. ETHAN: creatinine 1.5 on arrival. Improving. Creatinine 1.4. Will need BMP in 1 week.      6. HTN: continue meds     7. 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 : monitor blood sugars     8. Deconditioning: PT/OT am pac 18     9. H/o pericarditis     10. 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 Avoid calcium channel blockers/ beta blockers. Echo reviewed. Stable per cardiology.       Patient was set up for SNF. He will need BMP in 1 week.  He was discharged in stable condition with the following medications, instructions, and follow up.        Discharge Exam:  General Appearance: alert and oriented to person, place and time and in no acute distress  Skin: warm and dry  Head: normocephalic and atraumatic  Neck: neck supple and non tender without mass   Pulmonary/Chest: diminished throughout to auscultation Cardiovascular: normal rate, normal S1 and S2 and no carotid bruits  Abdomen: soft, non-tender, non-distended, normal bowel sounds, no masses or organomegaly  Extremities: no cyanosis, no clubbing and +1 edema   Neurologic: speech normal     I/O last 3 completed shifts:  In: -   Out: 300 [Urine:300]  No intake/output data recorded. LABS:  Recent Labs     20  0950 20  1010 20  1205    139 139   K 3.7 3.9 3.9   CL 99 100 102   CO2 27 26 26   BUN 16 16 15   CREATININE 1.2 1.2 1.4*   GLUCOSE 243* 253* 206*   CALCIUM 9.5 9.2 9.2       Recent Labs     20  1010          No results for input(s): POCGLU in the last 72 hours. Imaging:  Xr Chest Portable    Result Date: 2020  Patient MRN: [id number] : 1934 Age:  80 years Gender: Male Order Date: 2020 1:15 PM Exam: XR CHEST PORTABLE Number of Images: 1 view Indication:  Shortness of breath, fever Comparison: None. Findings: The lungs are symmetrically expanded, and show very subtle interstitial density in the lower lungs which could be artifact related to overlying large habitus (external soft tissues), but otherwise are clear. There is no evidence of pneumothorax or pleural effusion. Cardiovascular shadows show cardiomegaly with aortic intimal calcification and tortuosity, but no evidence of acute decompensation. Skeletal structures show no evidence of acute pathology. Hypertrophic degenerative spinal findings are noted. Overlying EKG leads and oxygen tubing are present. No definite evidence of acute cardiopulmonary pathology. Cardiomegaly without decompensation. Equivocal and minimal dependent interstitial density in both lungs versus overlying chest wall artifact. Cta Chest W Contrast    Result Date: 2020  Clinical indications: Chest pain. Pulmonary embolus. COMPARISON: Chest x-ray 2020. Exposure control:  This examination and all examinations utilizing ionizing radiation at this facility middle lobe and the bilateral lower lobes. Largest thrombus is within the distal right main pulmonary artery which almost occludes this vessel. I discussed these findings with Dr. Richard Hagan the emergency room attending. 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 The coronal and sagittal views do not confirm this as acute dissection. I discussed these findings with Dr. Richard Hagan the emergency room attending. 14 millimeter left lower lobe nodule which is somewhat suspicious. A follow-up CT of the chest in a few months is recommended. Diffuse interstitial lung disease and peripheral honeycombing. Scattered patchy groundglass densities may reflect manifestations of interstitial disease or acute infection. Patient Instructions:      Medication List      START taking these medications    apixaban 5 MG Tabs tablet  Commonly known as:  Eliquis DVT/PE Starter Pack  Take 10 mg (2 tablets) orally twice daily for 3 days, then take 5 mg (1 tablet) orally twice daily thereafter. * insulin lispro 100 UNIT/ML injection vial  Commonly known as:  HUMALOG  Inject 0-3 Units into the skin nightly     * insulin lispro 100 UNIT/ML injection vial  Commonly known as:  HUMALOG  Inject 0-6 Units into the skin 3 times daily (with meals)         * This list has 2 medication(s) that are the same as other medications prescribed for you. Read the directions carefully, and ask your doctor or other care provider to review them with you. CHANGE how you take these medications    * glimepiride 2 MG tablet  Commonly known as:  AMARYL  Take 1 tablet by mouth Daily with supper  What changed: You were already taking a medication with the same name, and this prescription was added. Make sure you understand how and when to take each.      * glimepiride 1 MG tablet  Commonly known as:  AMARYL  Take 1 tablet by mouth daily (with breakfast)  Start taking on:  April 25, 2020  What changed:    · medication strength  · how

## 2020-04-24 NOTE — DISCHARGE INSTR - COC
COVID-19 Rule Out 04/23/20 04/23/20 04/23/20 COVID-19 (Ordered)        Resolved    COVID-19 Rule Out 04/19/20 04/19/20 04/19/20 COVID-19 (Ordered)   04/19/20 Rule-Out Test Resulted    Not detected 4/19/2020          Nurse Assessment:  Last Vital Signs: /78   Pulse 80   Temp 97.3 °F (36.3 °C) (Infrared)   Resp 18   Ht 5' 7\" (1.702 m)   Wt 249 lb (112.9 kg)   SpO2 91%   BMI 39.00 kg/m²     Last documented pain score (0-10 scale): Pain Level: 0  Last Weight:   Wt Readings from Last 1 Encounters:   04/19/20 249 lb (112.9 kg)     Mental Status:  oriented, alert and short term memory deficits     IV Access:  - None    Nursing Mobility/ADLs:  Walking   Independent  Transfer  Independent  Bathing  Assisted  Dressing  Independent  [address]  Independent  Med Delivery   whole    Wound Care Documentation and Therapy:        Elimination:  Continence:   · Bowel: Yes  · Bladder: Yes  Urinary Catheter: None   Colostomy/Ileostomy/Ileal Conduit: No       Date of Last BM: unknown    Intake/Output Summary (Last 24 hours) at 4/24/2020 1350  Last data filed at 4/24/2020 0545  Gross per 24 hour   Intake --   Output 300 ml   Net -300 ml     I/O last 3 completed shifts:  In: -   Out: 300 [Urine:300]    Safety Concerns: At Risk for Falls and dementia with short-term memory deficits    Impairments/Disabilities:      Vision    Nutrition Therapy:  Current Nutrition Therapy:   - Oral Diet:  Carb Control 4 carbs/meal (1800kcals/day)    Routes of Feeding: Oral  Liquids: No Restrictions  Daily Fluid Restriction: no  Last Modified Barium Swallow with Video (Video Swallowing Test): not done    Treatments at the Time of Hospital Discharge:   Respiratory Treatments: Symbicort inhaler while hospitalized  Oxygen Therapy:  is not on home oxygen therapy.   Ventilator:    - No ventilator support    Rehab Therapies: Physical Therapy and Occupational Therapy  Weight Bearing Status/Restrictions: No

## 2020-04-24 NOTE — TELEPHONE ENCOUNTER
As FYI -- Received a VM message from daughter, Meghann De Jesus. States pt is in the hospital - has PE in both lungs. Will be transferred to a skilled unit, then to Assisted Living. States they have already removed the car, so no need to worry about the DMV form.

## 2020-04-24 NOTE — CARE COORDINATION
[facility] transport to [facility] arranged with 6 PM  scheduled with Peyton at [address]. Nursing aware. Nursing indicated they will notify family of dc time and of u/s cancellation. SW notified facility of  time. Envelope given to nursing. Maryjo Zavaleta.  Robert, MSN, RN  [facility] Case Management  [phone]

## 2020-04-24 NOTE — PROGRESS NOTES
Occupational Therapy  OT BEDSIDE TREATMENT NOTE      Date:2020  Patient Name: Margareth Fields  MRN: [id number]  : 1934  Room: [address] [place] [address]     Treatment requested this date by nurse and  20     Referring Valaria Schaumann, PA     Evaluating OT: Rios Marcelino OTR/L [id number]     AM-PAC Daily Activity Raw Score: 19     Recommended Adaptive Equipment: continue to assess      Diagnosis: Acute PE. Pt presents to ED from home with SOB.       Pertinent Medical History: pericarditis   Precautions:  Falls, droplet plus isolation COVID test negative, O2     Home Living: [facility] per pt report, per nursing pt has trouble with STM, possible questionable historian. Pt lives alone in a 2 story home with B/B 2nd floor, 1/2 bath 1st floor. Bathroom setup: tub/shower combo, standard commode      Prior Level of Function: Independent with ADLs, daughter assists PRN with IADLs; completed functional mobility with no AD  Driving: Yes?     Pain Level: no reported pain     Cognition: Alert and grossly oriented. Pleasant and cooperative.      Functional Assessment:    Initial Eval Status  Date: 20 Treatment session:    Short Term Goals  Treatment frequency: 2-5x/wk PRN x1-3 wks      Feeding Independent       Grooming SBA Supervision while standing at the sink Mod I   UB Dressing Set up   Independent   LB Dressing Min A  Pt able to cross leg over to thread clothing over feet and don shoes. Mod I    Bathing Mod A   Mod I   Toileting Min A Supervision while standing at toilet to urinate.  Mod I   Bed Mobility  Supine to sit: SBA       Functional Transfers STS: SBA Supervision   Mod I   Functional Mobility Supervision no AD  Household distance SBA with no AD  In room setting. Min SOB with limited exertion.   Mod I during ADLs   Balance Sitting: fair     Standing: fair no AD      Activity Tolerance Fair minus.  2.5L O2 restin%, post activity: 88% min recovery to 94% Fair  standing fabian x6-7 min with fair plus balance during self care tasks       Comments:  Pt sitting in chair and remained in chair at end of the session. Min cues for safety during session. Education/treatment:  ADL retraining with education of energy conservation and safety during self care activity. Therapeutic activity to address balance, strength, and endurance for increased ADL and transfers. Pt education of energy conservation and pacing safety. · Pt has made  progress towards set goals.    · Continue with current plan of care        Treatment Charges: Mins Units    ADL/Home Mgt 94853 10 1    Thera Activities 03672 5     Ther Ex 76836      Manual Therapy 49797      Neuro Re-ed 13276      Orthotic manage/training  52242      Non Billable Time      Total Timed Treatment 15 [address] [facility] PIPER/L [id number]

## 2020-04-24 NOTE — PROGRESS NOTES
Nurse to nurse report called to \"Lindsay\" at [facility] regarding discharge with expected pickup by [facility] transport in ~30 min.

## 2020-04-24 NOTE — PROGRESS NOTES
Objective:    /78   Pulse 80   Temp 97.3 °F (36.3 °C) (Infrared)   Resp 18   Ht 5' 7\" (1.702 m)   Wt 249 lb (112.9 kg)   SpO2 91%   BMI 39.00 kg/m²      General Appearance: alert and oriented to person, place and time and in no acute distress- intermittent confusion noted.   Skin: warm and dry  Head: normocephalic and atraumatic  Neck: neck supple and non tender without mass   Pulmonary/Chest: clear to auscultation bilaterally-  Cardiovascular: normal rate, normal S1 and S2 and no carotid bruits  Abdomen: soft, non-tender, non-distended, normal bowel sounds, no masses or organomegaly  Extremities: no cyanosis, no clubbing and no edema  Neurologic: speech normal       Recent Labs     04/22/20  0950 04/23/20  1010    139   K 3.7 3.9   CL 99 100   CO2 27 26   BUN 16 16   CREATININE 1.2 1.2   GLUCOSE 243* 253*   CALCIUM 9.5 9.2       Recent Labs     04/23/20  1010              Assessment:    Principal Problem:    Multiple subsegmental pulmonary emboli without acute cor pulmonale  Resolved Problems:    * No resolved hospital problems. *      Plan:  1.  Acute respiratory failure with hypoxia: likely related to bilateral PE: Appreciate Pulmonology input. Continue to wean 02. Currently weaned to RA.     2. Acute bilateral PE: appreciate pulmonology input. Initially on a heparin gtt- transitioned to eliquis. Per pulm will need anticoagulation x 6 months. On NC. Wean as tolerated.     3. Possible viral pneumonia: COVID 23- neg. Plaquenil stopped. Viral pneumonia ruled out.     4. Concern for interstitial lung diease: pulmonology following.     5. ETHAN: creatinine 1.5 on arrival. Improving. Creatinine 1.2      6. HTN: continue meds     7. 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 : monitor blood sugars     8. Deconditioning: PT/OT am pac 18     9. H/o pericarditis     10. 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  Avoid calcium channel blockers/ beta

## 2020-04-24 NOTE — TELEPHONE ENCOUNTER
Attempted to reach daughterSofie Clevermarvin again re: her request for [facility] form completion. Requested she return the call.

## 2020-04-24 NOTE — PROGRESS NOTES
results found for: TRIG  No results found for: HDL  No results found for: LDLCALC, LDLCHOLESTEROL  No results found for: LABVLDL, VLDL  No results found for: CHOLHDLRATIO    Cardiac Tests:  Telemetry findings reviewed: SR at rate 80, first-degree AV block, no pauses or VT/VF,no no abnormal rhythm     EKG: Sinus rhythm, second-degree AV block Mobitz type I, abnormal EKG.      Repeat EKG showed sinus rhythm with a first-degree block with intermittent block sinus beats, left anterior fascicular block, abnormal EKG.    Vitals: Blood pressure 142/72 with heart rate of 57>> 128/78 with heart rate of 80, afebrile since admission, sats were 93% on 2 L>>> 138/78 with heart rate of 80, afebrile sats 91% on room air.     Labs were reviewed: Bun/creatinine 16/1.4 potassium is 3.5, proBNP 8530, troponin 0.03, CRP 2.8, liver functions normal, A1c 8.6, CBC normal, no labs for today     CTA chest 4/19/2020:  Pulmonary embolism bilateral upper lobes, the right middle lobe and   the bilateral lower lobes. Largest thrombus is within the distal right   main pulmonary artery which almost occludes this vessel. I discussed   these findings with Dr. Violette Whitfield the emergency room attending.       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 The coronal   and sagittal views do not confirm this as acute dissection. I   discussed these findings with Dr. Violette Whitfield the emergency room   attending.       14 millimeter left lower lobe nodule which is somewhat suspicious. A   follow-up CT of the chest in a few months is recommended.       Diffuse interstitial lung disease and peripheral honeycombing.       Scattered patchy groundglass densities may reflect manifestations of   interstitial disease or acute infection.      TTE-4/23/2020:   Normal left ventricle size and systolic function. Ejection fraction is visually estimated at 60-65%. Frequent ectopy will   affect the evaluation of LV systolic function.    Indeterminate

## 2020-04-28 ENCOUNTER — TELEPHONE (OUTPATIENT)
Dept: CARDIOLOGY CLINIC | Age: 85
End: 2020-04-28

## 2020-05-20 ENCOUNTER — TELEPHONE (OUTPATIENT)
Dept: FAMILY MEDICINE CLINIC | Age: 85
End: 2020-05-20

## 2020-05-26 ENCOUNTER — HOSPITAL ENCOUNTER (OUTPATIENT)
Age: 85
Discharge: HOME | End: 2020-05-26
Payer: MEDICARE

## 2020-05-26 ENCOUNTER — HOSPITAL ENCOUNTER (OUTPATIENT)
Dept: MRI IMAGING | Age: 85
Discharge: HOME | End: 2020-05-28
Payer: MEDICARE

## 2020-05-26 LAB
ANION GAP SERPL CALCULATED.3IONS-SCNC: 10 MMOL/L (ref 7–16)
BUN BLDV-MCNC: 21 MG/DL (ref 8–23)
CALCIUM SERPL-MCNC: 9.3 MG/DL (ref 8.6–10.2)
CHLORIDE BLD-SCNC: 104 MMOL/L (ref 98–107)
CO2: 25 MMOL/L (ref 22–29)
CREAT SERPL-MCNC: 1.6 MG/DL (ref 0.7–1.2)
GFR AFRICAN AMERICAN: 50
GFR NON-AFRICAN AMERICAN: 41 ML/MIN/1.73
GLUCOSE BLD-MCNC: 105 MG/DL (ref 74–99)
POTASSIUM SERPL-SCNC: 3.8 MMOL/L (ref 3.5–5)
SODIUM BLD-SCNC: 139 MMOL/L (ref 132–146)

## 2020-05-26 PROCEDURE — 80048 BASIC METABOLIC PNL TOTAL CA: CPT

## 2020-05-26 PROCEDURE — 36415 COLL VENOUS BLD VENIPUNCTURE: CPT

## 2020-05-27 ENCOUNTER — TELEPHONE (OUTPATIENT)
Dept: FAMILY MEDICINE CLINIC | Age: 85
End: 2020-05-27

## 2020-05-27 NOTE — TELEPHONE ENCOUNTER
The patient went in for an MRI yesterday but had to leave without it being completed due to being claustrophic.   The family would like the patient to have an open MRI at [facility] open MRI if possible   Please advise

## 2020-06-04 ENCOUNTER — TELEPHONE (OUTPATIENT)
Dept: GERIATRIC MEDICINE | Age: 85
End: 2020-06-04

## 2020-06-17 DIAGNOSIS — F03.90 DEMENTIA WITHOUT BEHAVIORAL DISTURBANCE, UNSPECIFIED DEMENTIA TYPE: Primary | ICD-10-CM

## 2020-07-22 ENCOUNTER — OFFICE VISIT (OUTPATIENT)
Dept: CARDIOLOGY CLINIC | Age: 85
End: 2020-07-22
Payer: MEDICARE

## 2020-07-22 ENCOUNTER — TELEPHONE (OUTPATIENT)
Dept: GERIATRIC MEDICINE | Age: 85
End: 2020-07-22

## 2020-07-22 VITALS
BODY MASS INDEX: 35.07 KG/M2 | HEART RATE: 70 BPM | RESPIRATION RATE: 18 BRPM | SYSTOLIC BLOOD PRESSURE: 118 MMHG | HEIGHT: 68 IN | WEIGHT: 231.4 LBS | DIASTOLIC BLOOD PRESSURE: 68 MMHG

## 2020-07-22 PROCEDURE — 4040F PNEUMOC VAC/ADMIN/RCVD: CPT | Performed by: INTERNAL MEDICINE

## 2020-07-22 PROCEDURE — G8417 CALC BMI ABV UP PARAM F/U: HCPCS | Performed by: INTERNAL MEDICINE

## 2020-07-22 PROCEDURE — 1036F TOBACCO NON-USER: CPT | Performed by: INTERNAL MEDICINE

## 2020-07-22 PROCEDURE — G8427 DOCREV CUR MEDS BY ELIG CLIN: HCPCS | Performed by: INTERNAL MEDICINE

## 2020-07-22 PROCEDURE — 93000 ELECTROCARDIOGRAM COMPLETE: CPT | Performed by: INTERNAL MEDICINE

## 2020-07-22 PROCEDURE — 1123F ACP DISCUSS/DSCN MKR DOCD: CPT | Performed by: INTERNAL MEDICINE

## 2020-07-22 PROCEDURE — 99213 OFFICE O/P EST LOW 20 MIN: CPT | Performed by: INTERNAL MEDICINE

## 2020-07-22 RX ORDER — ACETAMINOPHEN 500 MG
1000 TABLET ORAL EVERY 6 HOURS PRN
Status: ON HOLD | COMMUNITY
End: 2021-01-01 | Stop reason: SDUPTHER

## 2020-07-22 RX ORDER — AMLODIPINE BESYLATE 5 MG/1
5 TABLET ORAL DAILY
Status: ON HOLD | COMMUNITY
End: 2021-01-01 | Stop reason: HOSPADM

## 2020-07-22 NOTE — TELEPHONE ENCOUNTER
Pt resides at [facility], has an appt scheduled here on Friday 7/24. Per Shauna Eng, nursing at [facility], pt may come to the appt but will need quarantined on return to the facility -- 2 weeks if family brings him to the appt. Per discussion with Dr Prasad Every yesterday, for the safety of all concerned, appt should be changed to a virtual visit. Daughter, Car Danielson, notified that virtual visit is preferred at present, and she is agreeable. Daughter would like to speak with Dr Prasad Every to get results of pt's recent MRI of the Brain, especially interested in the hippocampus area. Will also discuss any questions or concerns she may have in preparation for the visit. Please call daughter today to discuss and advise. Marixa Mcmillan at [facility] notified of change to virtual Video appt, and will assist pt with it on Friday.

## 2020-07-22 NOTE — PROGRESS NOTES
OUTPATIENT CARDIOLOGY FOLLOW-UP    Name: Magda Evans    Age: 80 y.o. Primary Care Physician: Loretta Santos MD    Date of Service: 7/22/2020    Chief Complaint:   Chief Complaint   Patient presents with    Heart Problem     hf/u- pt has no complaints       Interim History:   Mr. Alpesh Kellogg is a 42-year-old gentleman with history of severe dementia, type 2 diabetes not well controlled, hypertension, hypertriglyceridemia on TriCor, remote history of pericarditis, former smoker and history of hip replacement in 2018 who presented to the hospital in April 2020 with complaints of increasing dyspnea for the past 2 weeks. He was seen as a new consult and was diagnosed with second-degree AV block Mobitz type I, bilateral pulmonary emboli with a dilated RV hypertension and mixed hyperlipidemia. Patient was already on Eliquis for pulmonary embolism he was tested negative for COVID-19 pneumonia. Patient was recommended no pacemaker based on EKG findings. He was advised to follow-up with me in 3 months. Since he was discharged from the hospital he has not had any further hospitalizations or ER visits. He is compliant with medications, as well as salt and fluid intake. He does not take any over-the-counter arthritis medications. No new cardiac complaints since last cardiology evaluation. He denies recent chest pain, SOB, palpitations, lightheadedness, dizziness, syncope, PND, or orthopnea. SR on EKG.     Review of Systems:   Cardiac: As per HPI  General: No fever, chills  Pulmonary: As per HPI  HEENT: No visual disturbances, difficult swallowing  GI: No nausea, vomiting  Endocrine: No thyroid disease or DM  Musculoskeletal: DUCKWORTH x 4, no focal motor deficits  Skin: Intact, no rashes  Neuro/Psych: No headache or seizures    Past Medical History:  Past Medical History:   Diagnosis Date    Hyperlipidemia     Pericarditis        Past Surgical History:  Past Surgical History:   Procedure Laterality Date    JOINT REPLACEMENT      VASECTOMY         Family History:  History reviewed. No pertinent family history. Social History:  Social History     Socioeconomic History    Marital status:       Spouse name: Not on file    Number of children: Not on file    Years of education: Not on file    Highest education level: Not on file   Occupational History    Not on file   Social Needs    Financial resource strain: Not on file    Food insecurity     Worry: Not on file     Inability: Not on file    Transportation needs     Medical: Not on file     Non-medical: Not on file   Tobacco Use    Smoking status: Former Smoker     Packs/day: 2.50     Years: 18.00     Pack years: 45.00     Start date: 1972     Last attempt to quit: 1990     Years since quittin.5    Smokeless tobacco: Never Used   Substance and Sexual Activity    Alcohol use: Not Currently    Drug use: Never    Sexual activity: Not on file   Lifestyle    Physical activity     Days per week: Not on file     Minutes per session: Not on file    Stress: Not on file   Relationships    Social connections     Talks on phone: Not on file     Gets together: Not on file     Attends Christianity service: Not on file     Active member of club or organization: Not on file     Attends meetings of clubs or organizations: Not on file     Relationship status: Not on file    Intimate partner violence     Fear of current or ex partner: Not on file     Emotionally abused: Not on file     Physically abused: Not on file     Forced sexual activity: Not on file   Other Topics Concern    Not on file   Social History Narrative    Not on file       Allergies:  No Known Allergies    Current Medications:  Current Outpatient Medications   Medication Sig Dispense Refill    magnesium hydroxide (MILK OF MAGNESIA) 400 MG/5ML suspension Take 30 mLs by mouth daily as needed for Constipation      ARTIFICIAL TEAR OP Apply to eye      acetaminophen (TYLENOL) 500 MG tablet Take 500 mg by mouth every 6 hours as needed for Pain      amLODIPine (NORVASC) 5 MG tablet Take 5 mg by mouth daily      glimepiride (AMARYL) 2 MG tablet Take 1 tablet by mouth Daily with supper (Patient taking differently: Take 2 mg by mouth 2 times daily ) 30 tablet 3    apixaban (ELIQUIS DVT/PE STARTER PACK) 5 MG TABS tablet Take 10 mg (2 tablets) orally twice daily for 3 days, then take 5 mg (1 tablet) orally twice daily thereafter. 74 tablet 0    vitamin B-12 (CYANOCOBALAMIN) 1000 MCG tablet Take 1,500 mcg by mouth daily      donepezil (ARICEPT) 10 MG tablet Take 10 mg by mouth daily      memantine (NAMENDA) 10 MG tablet Take 10 mg by mouth 2 times daily      benazepril (LOTENSIN) 40 MG tablet Take 40 mg by mouth 2 times daily      furosemide (LASIX) 40 MG tablet Take 40 mg by mouth daily      fenofibrate (TRICOR) 145 MG tablet Take 145 mg by mouth daily      Apoaequorin (PREVAGEN EXTRA STRENGTH) 20 MG CAPS Take 1 capsule by mouth daily      glimepiride (AMARYL) 1 MG tablet Take 1 tablet by mouth daily (with breakfast) (Patient not taking: Reported on 7/22/2020) 30 tablet 3    insulin lispro (HUMALOG) 100 UNIT/ML injection vial Inject 0-3 Units into the skin nightly (Patient not taking: Reported on 7/22/2020) 1 vial 3    insulin lispro (HUMALOG) 100 UNIT/ML injection vial Inject 0-6 Units into the skin 3 times daily (with meals) (Patient not taking: Reported on 7/22/2020) 1 vial 3     No current facility-administered medications for this visit.         Physical Exam:  /68   Pulse 70   Resp 18   Ht 5' 8\" (1.727 m)   Wt 231 lb 6.4 oz (105 kg)   BMI 35.18 kg/m²   Wt Readings from Last 3 Encounters:   07/22/20 231 lb 6.4 oz (105 kg)   04/19/20 249 lb (112.9 kg)   03/23/20 243 lb 12.8 oz (110.6 kg)     Appearance: Awake, alert and oriented x 3, no acute respiratory distress  Skin: Intact, no rash  Head: Normocephalic, atraumatic  Eyes: EOMI, no conjunctival erythema  ENMT: No function. No regional wall motion abnormalities seen. Mild concentric left ventricular hypertrophy. Indeterminate diastolic function. Moderately dilated right ventricle. Right ventricle global systolic   function is normal . TAPSE 26 mm. Moderately enlarged right atrium size. No systolic mitral regurgitation noted. No hemodynamically significant aortic stenosis is present. Mild tricuspid regurgitation. RVSP is 60 mmHg. Pulmonary hypertension is moderate . No evidence for hemodynamically significant pericardial effusion. No previous echo for comparison. CTA chest 4/19/2020:  Pulmonary embolism bilateral upper lobes, the right middle lobe and   the bilateral lower lobes. Largest thrombus is within the distal right   main pulmonary artery which almost occludes this vessel. I discussed   these findings with Dr. Ely Dunn the emergency room attending.       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 The coronal   and sagittal views do not confirm this as acute dissection. I   discussed these findings with Dr. Ely Dunn the emergency room   attending.       14 millimeter left lower lobe nodule which is somewhat suspicious. A   follow-up CT of the chest in a few months is recommended.       Diffuse interstitial lung disease and peripheral honeycombing.       Scattered patchy groundglass densities may reflect manifestations of   interstitial disease or acute infection.          Stress test:        Cardiac catheterization:     The ASCVD Risk score (Yesenia Klelisabeth., et al., 2013) failed to calculate for the following reasons:     The 2013 ASCVD risk score is only valid for ages 36 to 78        ASSESSMENT:  · Episodes of second-degree AV block Mobitz type I, first-degree AV block with blocked  sinus beats, no indication for pacemaker, no incidence of atrial fibrillation or pauses  · Bilateral pulmonary emboli with mild RV strain with moderately dilated RV and RV function is well preserved. · Moderate pulmonary hypertension secondary submassive PE   · Elevated proBNP level most likely secondary to RV strain  · Hypertension, well controlled on lisinopril  · Type 2 diabetes not well controlled  · Mixed hyperlipidemia on fenofibrate.    · Severe dementia    Plan:   · Continue benazepril and amlodipine for hypertension  · Continue Eliquis for anticoagulation  · Avoid beta-blockers and calcium channel blockers due to underlying first-degree AV block and prior history of Mobitz type I block. · No indication for pacemaker at this time. · Patient and his daughter were advised to call us if he develops any dizzy spells or syncope. · Follow-up with me in 6 months. The patient's current medication list, allergies, problem list and results of all previously ordered testing were reviewed at today's visit.     Jose D Cantrell MD  [facility]) Cardiology

## 2020-07-22 NOTE — PATIENT INSTRUCTIONS
· Continue benazepril and amlodipine for hypertension  · Continue Eliquis for anticoagulation  · Avoid beta-blockers and calcium channel blockers due to underlying first-degree AV block and prior history of Mobitz type I block. · No indication for pacemaker at this time. · Patient and his daughter were advised to call us if he develops any dizzy spells or syncope. · Follow-up with me in 6 months.

## 2020-07-24 ENCOUNTER — VIRTUAL VISIT (OUTPATIENT)
Dept: GERIATRIC MEDICINE | Age: 85
End: 2020-07-24
Payer: MEDICARE

## 2020-07-24 PROCEDURE — 1123F ACP DISCUSS/DSCN MKR DOCD: CPT | Performed by: INTERNAL MEDICINE

## 2020-07-24 PROCEDURE — 99212 OFFICE O/P EST SF 10 MIN: CPT | Performed by: INTERNAL MEDICINE

## 2020-07-24 PROCEDURE — G8428 CUR MEDS NOT DOCUMENT: HCPCS | Performed by: INTERNAL MEDICINE

## 2020-07-24 PROCEDURE — G8417 CALC BMI ABV UP PARAM F/U: HCPCS | Performed by: INTERNAL MEDICINE

## 2020-07-24 PROCEDURE — 4040F PNEUMOC VAC/ADMIN/RCVD: CPT | Performed by: INTERNAL MEDICINE

## 2020-07-24 PROCEDURE — 1036F TOBACCO NON-USER: CPT | Performed by: INTERNAL MEDICINE

## 2020-07-24 NOTE — PROGRESS NOTES
TeleMedicine Video Visit    This visit was performed as a virtual video visit using a synchronous, two-way, audio-video telehealth technology platform. Patient identification was verified at the start of the visit, including the patient's telephone number and physical location. I discussed with the patient the nature of our telehealth visits, that:     1. Due to the nature of an audio- video modality, the only components of a physical exam that could be done are the elements supported by direct observation. 2. I would evaluate the patient and recommend diagnostics and treatments based on my assessment. 3. If it was felt that the patient should be evaluated in clinic or an emergency room setting, then they would be directed there. 4. Our sessions are not being recorded and that personal health information is protected. 5. Our team would provide follow up care in person if/when the patient needs it. Patient does agree to proceed with telemedicine consultation. Patient's location: home address in [facility]  Physician  location home address in [facility] other people involved in call nursing      Time spent: Greater than 20    This visit was completed virtually using Doxy. me        Video visit 7/24/20 at 4;30pm  Update on brain MRI and his progression  Gave daughter the link to diet therapy yesterday  On Prevagen   HYM?  Good  Trump  ?  2022, July    ???   Shruti ??  DROWO  41 cents   [address]  mincog 0/3 ATP   And clock 4/4\  Animals dog cat horse cow pig pony gazelle sheep buffalo zebra   Impression: MCI stable  Plan ; Aricept 10 mg and Namenda 20 mg a            No other therapy needed at this time

## 2020-07-27 PROBLEM — E66.01 MORBIDLY OBESE (HCC): Status: ACTIVE | Noted: 2020-07-27

## 2020-08-17 ENCOUNTER — TELEPHONE (OUTPATIENT)
Dept: GERIATRIC MEDICINE | Age: 85
End: 2020-08-17

## 2020-08-17 NOTE — TELEPHONE ENCOUNTER
Pt daughter called states that you where supposed to call her about dads last visit.  Please call her Elena Ordoñez [phone]

## 2020-08-21 ENCOUNTER — TELEPHONE (OUTPATIENT)
Dept: GERIATRIC MEDICINE | Age: 85
End: 2020-08-21

## 2020-08-21 RX ORDER — DONEPEZIL HYDROCHLORIDE 5 MG/1
5 TABLET, FILM COATED ORAL
Qty: 90 TABLET | Refills: 3 | Status: SHIPPED
Start: 2020-08-21 | End: 2021-01-19 | Stop reason: ALTCHOICE

## 2020-08-21 RX ORDER — DONEPEZIL HYDROCHLORIDE 10 MG/1
10 TABLET, FILM COATED ORAL DAILY
Qty: 30 TABLET | Refills: 5 | Status: ON HOLD
Start: 2020-08-21 | End: 2022-01-01

## 2020-08-21 NOTE — TELEPHONE ENCOUNTER
Spoke with daughter and 5 minute Memory still impaired   Recommend increasing Aricept to 15 mg a day  Patient at [facility]

## 2021-01-01 ENCOUNTER — HOSPITAL ENCOUNTER (OUTPATIENT)
Dept: OTHER | Age: 86
Setting detail: THERAPIES SERIES
Discharge: HOME | End: 2021-09-28
Payer: MEDICARE

## 2021-01-01 ENCOUNTER — HOSPITAL ENCOUNTER (OUTPATIENT)
Dept: OTHER | Age: 86
Setting detail: THERAPIES SERIES
Discharge: HOME | End: 2021-11-15
Payer: MEDICARE

## 2021-01-01 ENCOUNTER — TELEPHONE (OUTPATIENT)
Dept: OTHER | Age: 86
End: 2021-01-01

## 2021-01-01 ENCOUNTER — HOSPITAL ENCOUNTER (OUTPATIENT)
Dept: OTHER | Age: 86
Setting detail: THERAPIES SERIES
Discharge: HOME | End: 2021-10-12
Payer: MEDICARE

## 2021-01-01 ENCOUNTER — HOSPITAL ENCOUNTER (OUTPATIENT)
Dept: OTHER | Age: 86
Setting detail: THERAPIES SERIES
Discharge: HOME | End: 2021-10-19
Payer: MEDICARE

## 2021-01-01 ENCOUNTER — HOSPITAL ENCOUNTER (INPATIENT)
Age: 86
LOS: 3 days | Discharge: NURSING, REHAB OR LONG-TERM CARE | DRG: 292 | End: 2021-09-02
Attending: EMERGENCY MEDICINE | Admitting: INTERNAL MEDICINE
Payer: MEDICARE

## 2021-01-01 ENCOUNTER — TELEPHONE (OUTPATIENT)
Dept: CARDIOLOGY CLINIC | Age: 86
End: 2021-01-01

## 2021-01-01 ENCOUNTER — APPOINTMENT (OUTPATIENT)
Dept: GENERAL RADIOLOGY | Age: 86
DRG: 292 | End: 2021-01-01
Payer: MEDICARE

## 2021-01-01 ENCOUNTER — TELEPHONE (OUTPATIENT)
Dept: ADMINISTRATIVE | Age: 86
End: 2021-01-01

## 2021-01-01 ENCOUNTER — OFFICE VISIT (OUTPATIENT)
Dept: GERIATRIC MEDICINE | Age: 86
End: 2021-01-01
Payer: MEDICARE

## 2021-01-01 ENCOUNTER — OFFICE VISIT (OUTPATIENT)
Dept: CARDIOLOGY CLINIC | Age: 86
End: 2021-01-01
Payer: MEDICARE

## 2021-01-01 ENCOUNTER — CARE COORDINATION (OUTPATIENT)
Dept: CASE MANAGEMENT | Age: 86
End: 2021-01-01

## 2021-01-01 ENCOUNTER — CARE COORDINATION (OUTPATIENT)
Dept: CARE COORDINATION | Age: 86
End: 2021-01-01

## 2021-01-01 ENCOUNTER — HOSPITAL ENCOUNTER (OUTPATIENT)
Dept: OTHER | Age: 86
Setting detail: THERAPIES SERIES
Discharge: HOME | End: 2021-11-01
Payer: MEDICARE

## 2021-01-01 VITALS
WEIGHT: 229.2 LBS | RESPIRATION RATE: 18 BRPM | BODY MASS INDEX: 34.85 KG/M2 | DIASTOLIC BLOOD PRESSURE: 54 MMHG | HEART RATE: 71 BPM | SYSTOLIC BLOOD PRESSURE: 95 MMHG | OXYGEN SATURATION: 93 %

## 2021-01-01 VITALS
DIASTOLIC BLOOD PRESSURE: 53 MMHG | TEMPERATURE: 97.7 F | HEART RATE: 73 BPM | SYSTOLIC BLOOD PRESSURE: 94 MMHG | RESPIRATION RATE: 18 BRPM | OXYGEN SATURATION: 94 %

## 2021-01-01 VITALS
WEIGHT: 224.4 LBS | OXYGEN SATURATION: 92 % | RESPIRATION RATE: 16 BRPM | HEART RATE: 57 BPM | BODY MASS INDEX: 34.12 KG/M2 | SYSTOLIC BLOOD PRESSURE: 130 MMHG | DIASTOLIC BLOOD PRESSURE: 70 MMHG

## 2021-01-01 VITALS
HEART RATE: 68 BPM | HEIGHT: 68 IN | RESPIRATION RATE: 18 BRPM | OXYGEN SATURATION: 95 % | DIASTOLIC BLOOD PRESSURE: 69 MMHG | TEMPERATURE: 97.1 F | BODY MASS INDEX: 35.19 KG/M2 | WEIGHT: 232.2 LBS | SYSTOLIC BLOOD PRESSURE: 134 MMHG

## 2021-01-01 VITALS
HEART RATE: 67 BPM | RESPIRATION RATE: 18 BRPM | HEIGHT: 68 IN | BODY MASS INDEX: 36.4 KG/M2 | DIASTOLIC BLOOD PRESSURE: 50 MMHG | OXYGEN SATURATION: 95 % | SYSTOLIC BLOOD PRESSURE: 124 MMHG | WEIGHT: 240.2 LBS

## 2021-01-01 VITALS
SYSTOLIC BLOOD PRESSURE: 90 MMHG | RESPIRATION RATE: 22 BRPM | TEMPERATURE: 97.1 F | HEART RATE: 54 BPM | BODY MASS INDEX: 33.37 KG/M2 | DIASTOLIC BLOOD PRESSURE: 60 MMHG | WEIGHT: 219.5 LBS

## 2021-01-01 VITALS
HEART RATE: 66 BPM | OXYGEN SATURATION: 93 % | RESPIRATION RATE: 18 BRPM | BODY MASS INDEX: 33.54 KG/M2 | WEIGHT: 220.6 LBS

## 2021-01-01 DIAGNOSIS — Z79.01 CHRONIC ANTICOAGULATION: ICD-10-CM

## 2021-01-01 DIAGNOSIS — R00.1 BRADYCARDIA: Primary | ICD-10-CM

## 2021-01-01 DIAGNOSIS — I26.94 MULTIPLE SUBSEGMENTAL PULMONARY EMBOLI WITHOUT ACUTE COR PULMONALE (HCC): ICD-10-CM

## 2021-01-01 DIAGNOSIS — I50.33 ACUTE ON CHRONIC HEART FAILURE WITH PRESERVED EJECTION FRACTION (HCC): Primary | ICD-10-CM

## 2021-01-01 DIAGNOSIS — F02.80 ALZHEIMER DISEASE (HCC): Primary | ICD-10-CM

## 2021-01-01 DIAGNOSIS — I50.31 ACUTE DIASTOLIC CHF (CONGESTIVE HEART FAILURE) (HCC): ICD-10-CM

## 2021-01-01 DIAGNOSIS — I50.9 ACUTE CONGESTIVE HEART FAILURE, UNSPECIFIED HEART FAILURE TYPE (HCC): Primary | ICD-10-CM

## 2021-01-01 DIAGNOSIS — I27.20 PULMONARY HTN (HCC): ICD-10-CM

## 2021-01-01 DIAGNOSIS — R00.1 BRADYCARDIA: ICD-10-CM

## 2021-01-01 DIAGNOSIS — G30.9 ALZHEIMER DISEASE (HCC): Primary | ICD-10-CM

## 2021-01-01 DIAGNOSIS — E78.5 HYPERLIPIDEMIA, UNSPECIFIED HYPERLIPIDEMIA TYPE: ICD-10-CM

## 2021-01-01 DIAGNOSIS — R06.02 SOB (SHORTNESS OF BREATH) ON EXERTION: ICD-10-CM

## 2021-01-01 DIAGNOSIS — I50.31 ACUTE DIASTOLIC CHF (CONGESTIVE HEART FAILURE) (HCC): Primary | ICD-10-CM

## 2021-01-01 LAB
ALBUMIN SERPL-MCNC: 3.2 G/DL (ref 3.5–5.2)
ALP BLD-CCNC: 49 U/L (ref 40–129)
ALT SERPL-CCNC: 9 U/L (ref 0–40)
ANION GAP SERPL CALCULATED.3IONS-SCNC: 10 MMOL/L (ref 7–16)
ANION GAP SERPL CALCULATED.3IONS-SCNC: 13 MMOL/L (ref 7–16)
ANION GAP SERPL CALCULATED.3IONS-SCNC: 13 MMOL/L (ref 7–16)
ANION GAP SERPL CALCULATED.3IONS-SCNC: 14 MMOL/L (ref 7–16)
ANION GAP SERPL CALCULATED.3IONS-SCNC: 9 MMOL/L (ref 7–16)
AST SERPL-CCNC: 27 U/L (ref 0–39)
BASOPHILS ABSOLUTE: 0.1 E9/L (ref 0–0.2)
BASOPHILS RELATIVE PERCENT: 1.5 % (ref 0–2)
BILIRUB SERPL-MCNC: 0.4 MG/DL (ref 0–1.2)
BUN BLDV-MCNC: 12 MG/DL (ref 6–23)
BUN BLDV-MCNC: 12 MG/DL (ref 6–23)
BUN BLDV-MCNC: 13 MG/DL (ref 6–23)
BUN BLDV-MCNC: 15 MG/DL (ref 6–23)
BUN BLDV-MCNC: 16 MG/DL (ref 6–23)
BUN BLDV-MCNC: 22 MG/DL (ref 6–23)
BUN BLDV-MCNC: 33 MG/DL (ref 6–23)
CALCIUM SERPL-MCNC: 8.1 MG/DL (ref 8.6–10.2)
CALCIUM SERPL-MCNC: 8.6 MG/DL (ref 8.6–10.2)
CALCIUM SERPL-MCNC: 8.7 MG/DL (ref 8.6–10.2)
CALCIUM SERPL-MCNC: 8.9 MG/DL (ref 8.6–10.2)
CALCIUM SERPL-MCNC: 9.2 MG/DL (ref 8.6–10.2)
CALCIUM SERPL-MCNC: 9.2 MG/DL (ref 8.6–10.2)
CALCIUM SERPL-MCNC: 9.3 MG/DL (ref 8.6–10.2)
CHLORIDE BLD-SCNC: 101 MMOL/L (ref 98–107)
CHLORIDE BLD-SCNC: 102 MMOL/L (ref 98–107)
CHLORIDE BLD-SCNC: 102 MMOL/L (ref 98–107)
CHLORIDE BLD-SCNC: 104 MMOL/L (ref 98–107)
CHLORIDE BLD-SCNC: 106 MMOL/L (ref 98–107)
CHLORIDE BLD-SCNC: 107 MMOL/L (ref 98–107)
CHLORIDE BLD-SCNC: 99 MMOL/L (ref 98–107)
CO2: 20 MMOL/L (ref 22–29)
CO2: 20 MMOL/L (ref 22–29)
CO2: 23 MMOL/L (ref 22–29)
CO2: 23 MMOL/L (ref 22–29)
CO2: 24 MMOL/L (ref 22–29)
CO2: 25 MMOL/L (ref 22–29)
CO2: 30 MMOL/L (ref 22–29)
CREAT SERPL-MCNC: 0.9 MG/DL (ref 0.7–1.2)
CREAT SERPL-MCNC: 1 MG/DL (ref 0.7–1.2)
CREAT SERPL-MCNC: 1 MG/DL (ref 0.7–1.2)
CREAT SERPL-MCNC: 1.1 MG/DL (ref 0.7–1.2)
CREAT SERPL-MCNC: 1.6 MG/DL (ref 0.7–1.2)
EKG ATRIAL RATE: 35 BPM
EKG ATRIAL RATE: 48 BPM
EKG P AXIS: 53 DEGREES
EKG P-R INTERVAL: 288 MS
EKG Q-T INTERVAL: 464 MS
EKG Q-T INTERVAL: 472 MS
EKG QRS DURATION: 108 MS
EKG QRS DURATION: 122 MS
EKG QTC CALCULATION (BAZETT): 398 MS
EKG QTC CALCULATION (BAZETT): 414 MS
EKG R AXIS: -41 DEGREES
EKG R AXIS: -44 DEGREES
EKG T AXIS: 12 DEGREES
EKG T AXIS: 30 DEGREES
EKG VENTRICULAR RATE: 43 BPM
EKG VENTRICULAR RATE: 48 BPM
EOSINOPHILS ABSOLUTE: 0.27 E9/L (ref 0.05–0.5)
EOSINOPHILS RELATIVE PERCENT: 4 % (ref 0–6)
GFR AFRICAN AMERICAN: 50
GFR AFRICAN AMERICAN: >60
GFR NON-AFRICAN AMERICAN: 41 ML/MIN/1.73
GFR NON-AFRICAN AMERICAN: >60 ML/MIN/1.73
GLUCOSE BLD-MCNC: 128 MG/DL (ref 74–99)
GLUCOSE BLD-MCNC: 142 MG/DL (ref 74–99)
GLUCOSE BLD-MCNC: 144 MG/DL (ref 74–99)
GLUCOSE BLD-MCNC: 163 MG/DL (ref 74–99)
GLUCOSE BLD-MCNC: 171 MG/DL (ref 74–99)
GLUCOSE BLD-MCNC: 177 MG/DL (ref 74–99)
GLUCOSE BLD-MCNC: 207 MG/DL (ref 74–99)
HCT VFR BLD CALC: 43 % (ref 37–54)
HEMOGLOBIN: 13.9 G/DL (ref 12.5–16.5)
IMMATURE GRANULOCYTES #: 0.02 E9/L
IMMATURE GRANULOCYTES %: 0.3 % (ref 0–5)
INR BLD: 2.1
INR BLD: 2.2
INR BLD: 2.2
INR BLD: 2.5
LACTIC ACID: 1.4 MMOL/L (ref 0.5–2.2)
LYMPHOCYTES ABSOLUTE: 2.24 E9/L (ref 1.5–4)
LYMPHOCYTES RELATIVE PERCENT: 33.6 % (ref 20–42)
MAGNESIUM: 1.9 MG/DL (ref 1.6–2.6)
MCH RBC QN AUTO: 29.5 PG (ref 26–35)
MCHC RBC AUTO-ENTMCNC: 32.3 % (ref 32–34.5)
MCV RBC AUTO: 91.3 FL (ref 80–99.9)
METER GLUCOSE: 124 MG/DL (ref 74–99)
METER GLUCOSE: 127 MG/DL (ref 74–99)
METER GLUCOSE: 135 MG/DL (ref 74–99)
METER GLUCOSE: 136 MG/DL (ref 74–99)
METER GLUCOSE: 143 MG/DL (ref 74–99)
METER GLUCOSE: 145 MG/DL (ref 74–99)
METER GLUCOSE: 146 MG/DL (ref 74–99)
METER GLUCOSE: 148 MG/DL (ref 74–99)
METER GLUCOSE: 150 MG/DL (ref 74–99)
METER GLUCOSE: 151 MG/DL (ref 74–99)
METER GLUCOSE: 156 MG/DL (ref 74–99)
METER GLUCOSE: 182 MG/DL (ref 74–99)
MONOCYTES ABSOLUTE: 0.78 E9/L (ref 0.1–0.95)
MONOCYTES RELATIVE PERCENT: 11.7 % (ref 2–12)
NEUTROPHILS ABSOLUTE: 3.26 E9/L (ref 1.8–7.3)
NEUTROPHILS RELATIVE PERCENT: 48.9 % (ref 43–80)
PDW BLD-RTO: 13.9 FL (ref 11.5–15)
PLATELET # BLD: 231 E9/L (ref 130–450)
PMV BLD AUTO: 12.1 FL (ref 7–12)
POTASSIUM SERPL-SCNC: 3.8 MMOL/L (ref 3.5–5)
POTASSIUM SERPL-SCNC: 3.9 MMOL/L (ref 3.5–5)
POTASSIUM SERPL-SCNC: 4 MMOL/L (ref 3.5–5)
POTASSIUM SERPL-SCNC: 4 MMOL/L (ref 3.5–5)
POTASSIUM SERPL-SCNC: 4.1 MMOL/L (ref 3.5–5)
POTASSIUM SERPL-SCNC: 4.2 MMOL/L (ref 3.5–5)
POTASSIUM SERPL-SCNC: 4.3 MMOL/L (ref 3.5–5)
PRO-BNP: 1045 PG/ML (ref 0–450)
PRO-BNP: 534 PG/ML (ref 0–450)
PRO-BNP: 675 PG/ML (ref 0–450)
PRO-BNP: 954 PG/ML (ref 0–450)
PROTHROMBIN TIME: 22.8 SEC (ref 9.3–12.4)
PROTHROMBIN TIME: 24.3 SEC (ref 9.3–12.4)
PROTHROMBIN TIME: 24.3 SEC (ref 9.3–12.4)
PROTHROMBIN TIME: 27 SEC (ref 9.3–12.4)
RBC # BLD: 4.71 E12/L (ref 3.8–5.8)
SARS-COV-2, NAAT: NOT DETECTED
SARS-COV-2, NAAT: NOT DETECTED
SODIUM BLD-SCNC: 136 MMOL/L (ref 132–146)
SODIUM BLD-SCNC: 137 MMOL/L (ref 132–146)
SODIUM BLD-SCNC: 138 MMOL/L (ref 132–146)
SODIUM BLD-SCNC: 139 MMOL/L (ref 132–146)
SODIUM BLD-SCNC: 140 MMOL/L (ref 132–146)
SODIUM BLD-SCNC: 141 MMOL/L (ref 132–146)
SODIUM BLD-SCNC: 142 MMOL/L (ref 132–146)
TOTAL PROTEIN: 6.5 G/DL (ref 6.4–8.3)
TROPONIN, HIGH SENSITIVITY: 41 NG/L (ref 0–11)
TROPONIN, HIGH SENSITIVITY: 41 NG/L (ref 0–11)
WBC # BLD: 6.7 E9/L (ref 4.5–11.5)

## 2021-01-01 PROCEDURE — 1123F ACP DISCUSS/DSCN MKR DOCD: CPT | Performed by: NURSE PRACTITIONER

## 2021-01-01 PROCEDURE — 84484 ASSAY OF TROPONIN QUANT: CPT

## 2021-01-01 PROCEDURE — 1036F TOBACCO NON-USER: CPT | Performed by: INTERNAL MEDICINE

## 2021-01-01 PROCEDURE — 6360000002 HC RX W HCPCS: Performed by: EMERGENCY MEDICINE

## 2021-01-01 PROCEDURE — 82962 GLUCOSE BLOOD TEST: CPT

## 2021-01-01 PROCEDURE — 2140000000 HC CCU INTERMEDIATE R&B

## 2021-01-01 PROCEDURE — 6370000000 HC RX 637 (ALT 250 FOR IP): Performed by: PHYSICIAN ASSISTANT

## 2021-01-01 PROCEDURE — 80048 BASIC METABOLIC PNL TOTAL CA: CPT

## 2021-01-01 PROCEDURE — 99222 1ST HOSP IP/OBS MODERATE 55: CPT | Performed by: INTERNAL MEDICINE

## 2021-01-01 PROCEDURE — 99204 OFFICE O/P NEW MOD 45 MIN: CPT

## 2021-01-01 PROCEDURE — 36415 COLL VENOUS BLD VENIPUNCTURE: CPT

## 2021-01-01 PROCEDURE — G8417 CALC BMI ABV UP PARAM F/U: HCPCS | Performed by: INTERNAL MEDICINE

## 2021-01-01 PROCEDURE — 6370000000 HC RX 637 (ALT 250 FOR IP): Performed by: INTERNAL MEDICINE

## 2021-01-01 PROCEDURE — 99214 OFFICE O/P EST MOD 30 MIN: CPT

## 2021-01-01 PROCEDURE — 83880 ASSAY OF NATRIURETIC PEPTIDE: CPT

## 2021-01-01 PROCEDURE — 96374 THER/PROPH/DIAG INJ IV PUSH: CPT

## 2021-01-01 PROCEDURE — 99214 OFFICE O/P EST MOD 30 MIN: CPT | Performed by: INTERNAL MEDICINE

## 2021-01-01 PROCEDURE — 97535 SELF CARE MNGMENT TRAINING: CPT

## 2021-01-01 PROCEDURE — 83735 ASSAY OF MAGNESIUM: CPT

## 2021-01-01 PROCEDURE — 2500000003 HC RX 250 WO HCPCS: Performed by: INTERNAL MEDICINE

## 2021-01-01 PROCEDURE — 99284 EMERGENCY DEPT VISIT MOD MDM: CPT

## 2021-01-01 PROCEDURE — G8417 CALC BMI ABV UP PARAM F/U: HCPCS | Performed by: NURSE PRACTITIONER

## 2021-01-01 PROCEDURE — 4040F PNEUMOC VAC/ADMIN/RCVD: CPT | Performed by: INTERNAL MEDICINE

## 2021-01-01 PROCEDURE — 4040F PNEUMOC VAC/ADMIN/RCVD: CPT | Performed by: NURSE PRACTITIONER

## 2021-01-01 PROCEDURE — 85610 PROTHROMBIN TIME: CPT

## 2021-01-01 PROCEDURE — 93005 ELECTROCARDIOGRAM TRACING: CPT | Performed by: EMERGENCY MEDICINE

## 2021-01-01 PROCEDURE — 1036F TOBACCO NON-USER: CPT | Performed by: NURSE PRACTITIONER

## 2021-01-01 PROCEDURE — 1123F ACP DISCUSS/DSCN MKR DOCD: CPT | Performed by: INTERNAL MEDICINE

## 2021-01-01 PROCEDURE — 71045 X-RAY EXAM CHEST 1 VIEW: CPT

## 2021-01-01 PROCEDURE — 2500000003 HC RX 250 WO HCPCS: Performed by: EMERGENCY MEDICINE

## 2021-01-01 PROCEDURE — 99214 OFFICE O/P EST MOD 30 MIN: CPT | Performed by: NURSE PRACTITIONER

## 2021-01-01 PROCEDURE — G8484 FLU IMMUNIZE NO ADMIN: HCPCS | Performed by: INTERNAL MEDICINE

## 2021-01-01 PROCEDURE — 93005 ELECTROCARDIOGRAM TRACING: CPT | Performed by: NURSE PRACTITIONER

## 2021-01-01 PROCEDURE — 93000 ELECTROCARDIOGRAM COMPLETE: CPT | Performed by: INTERNAL MEDICINE

## 2021-01-01 PROCEDURE — 99213 OFFICE O/P EST LOW 20 MIN: CPT | Performed by: INTERNAL MEDICINE

## 2021-01-01 PROCEDURE — APPSS60 APP SPLIT SHARED TIME 46-60 MINUTES: Performed by: PHYSICIAN ASSISTANT

## 2021-01-01 PROCEDURE — 97165 OT EVAL LOW COMPLEX 30 MIN: CPT

## 2021-01-01 PROCEDURE — 87635 SARS-COV-2 COVID-19 AMP PRB: CPT

## 2021-01-01 PROCEDURE — 97530 THERAPEUTIC ACTIVITIES: CPT

## 2021-01-01 PROCEDURE — G8427 DOCREV CUR MEDS BY ELIG CLIN: HCPCS | Performed by: NURSE PRACTITIONER

## 2021-01-01 PROCEDURE — G8427 DOCREV CUR MEDS BY ELIG CLIN: HCPCS | Performed by: INTERNAL MEDICINE

## 2021-01-01 PROCEDURE — 97161 PT EVAL LOW COMPLEX 20 MIN: CPT

## 2021-01-01 PROCEDURE — 83605 ASSAY OF LACTIC ACID: CPT

## 2021-01-01 PROCEDURE — 96375 TX/PRO/DX INJ NEW DRUG ADDON: CPT

## 2021-01-01 PROCEDURE — 99212 OFFICE O/P EST SF 10 MIN: CPT | Performed by: INTERNAL MEDICINE

## 2021-01-01 PROCEDURE — 1111F DSCHRG MED/CURRENT MED MERGE: CPT | Performed by: NURSE PRACTITIONER

## 2021-01-01 PROCEDURE — 85025 COMPLETE CBC W/AUTO DIFF WBC: CPT

## 2021-01-01 PROCEDURE — 80053 COMPREHEN METABOLIC PANEL: CPT

## 2021-01-01 RX ORDER — GLIMEPIRIDE 4 MG/1
4 TABLET ORAL 2 TIMES DAILY
Status: ON HOLD | COMMUNITY
End: 2022-01-01

## 2021-01-01 RX ORDER — LISINOPRIL 20 MG/1
40 TABLET ORAL DAILY
Status: DISCONTINUED | OUTPATIENT
Start: 2021-01-01 | End: 2021-01-01 | Stop reason: HOSPADM

## 2021-01-01 RX ORDER — DONEPEZIL HYDROCHLORIDE 5 MG/1
10 TABLET, FILM COATED ORAL DAILY
Status: DISCONTINUED | OUTPATIENT
Start: 2021-01-01 | End: 2021-01-01 | Stop reason: HOSPADM

## 2021-01-01 RX ORDER — DEXTROSE MONOHYDRATE 50 MG/ML
100 INJECTION, SOLUTION INTRAVENOUS PRN
Status: DISCONTINUED | OUTPATIENT
Start: 2021-01-01 | End: 2021-01-01 | Stop reason: HOSPADM

## 2021-01-01 RX ORDER — HYDRALAZINE HYDROCHLORIDE 100 MG/1
100 TABLET, FILM COATED ORAL EVERY 8 HOURS SCHEDULED
Qty: 90 TABLET | Refills: 0 | Status: ON HOLD | OUTPATIENT
Start: 2021-01-01 | End: 2022-01-01

## 2021-01-01 RX ORDER — POTASSIUM CHLORIDE 20 MEQ/1
20 TABLET, EXTENDED RELEASE ORAL DAILY
Qty: 30 TABLET | Refills: 0 | Status: SHIPPED | OUTPATIENT
Start: 2021-01-01

## 2021-01-01 RX ORDER — POTASSIUM CHLORIDE 20 MEQ/1
20 TABLET, EXTENDED RELEASE ORAL 2 TIMES DAILY WITH MEALS
Status: DISCONTINUED | OUTPATIENT
Start: 2021-01-01 | End: 2021-01-01 | Stop reason: HOSPADM

## 2021-01-01 RX ORDER — VALACYCLOVIR HYDROCHLORIDE 1 G/1
TABLET, FILM COATED ORAL 3 TIMES DAILY
COMMUNITY
Start: 2021-01-01 | End: 2021-01-01

## 2021-01-01 RX ORDER — GLIMEPIRIDE 2 MG/1
2 TABLET ORAL 2 TIMES DAILY
Qty: 60 TABLET | Refills: 0
Start: 2021-01-01 | End: 2021-01-01

## 2021-01-01 RX ORDER — BUMETANIDE 0.25 MG/ML
1 INJECTION, SOLUTION INTRAMUSCULAR; INTRAVENOUS ONCE
Status: COMPLETED | OUTPATIENT
Start: 2021-01-01 | End: 2021-01-01

## 2021-01-01 RX ORDER — HYDRALAZINE HYDROCHLORIDE 20 MG/ML
5 INJECTION INTRAMUSCULAR; INTRAVENOUS ONCE
Status: COMPLETED | OUTPATIENT
Start: 2021-01-01 | End: 2021-01-01

## 2021-01-01 RX ORDER — HYDRALAZINE HYDROCHLORIDE 25 MG/1
25 TABLET, FILM COATED ORAL EVERY 8 HOURS SCHEDULED
Status: DISCONTINUED | OUTPATIENT
Start: 2021-01-01 | End: 2021-01-01

## 2021-01-01 RX ORDER — LISINOPRIL 40 MG/1
40 TABLET ORAL DAILY
Status: ON HOLD | COMMUNITY
End: 2022-01-01

## 2021-01-01 RX ORDER — TRIAMCINOLONE ACETONIDE 1 MG/G
CREAM TOPICAL 2 TIMES DAILY
COMMUNITY

## 2021-01-01 RX ORDER — GLIMEPIRIDE 2 MG/1
2 TABLET ORAL 2 TIMES DAILY
Status: DISCONTINUED | OUTPATIENT
Start: 2021-01-01 | End: 2021-01-01 | Stop reason: CLARIF

## 2021-01-01 RX ORDER — PREDNISOLONE ACETATE 10 MG/ML
1 SUSPENSION/ DROPS OPHTHALMIC 4 TIMES DAILY
Status: ON HOLD | COMMUNITY
End: 2022-01-01

## 2021-01-01 RX ORDER — HYDRALAZINE HYDROCHLORIDE 50 MG/1
100 TABLET, FILM COATED ORAL EVERY 8 HOURS SCHEDULED
Status: DISCONTINUED | OUTPATIENT
Start: 2021-01-01 | End: 2021-01-01 | Stop reason: HOSPADM

## 2021-01-01 RX ORDER — NICOTINE POLACRILEX 4 MG
15 LOZENGE BUCCAL PRN
Status: DISCONTINUED | OUTPATIENT
Start: 2021-01-01 | End: 2021-01-01 | Stop reason: HOSPADM

## 2021-01-01 RX ORDER — ERYTHROMYCIN 5 MG/G
OINTMENT OPHTHALMIC
COMMUNITY
Start: 2021-01-01 | End: 2021-01-01

## 2021-01-01 RX ORDER — MIRTAZAPINE 15 MG/1
7.5 TABLET, FILM COATED ORAL NIGHTLY
Qty: 30 TABLET | Refills: 3 | Status: ON HOLD
Start: 2021-01-01 | End: 2022-01-01

## 2021-01-01 RX ORDER — DEXTROSE MONOHYDRATE 25 G/50ML
12.5 INJECTION, SOLUTION INTRAVENOUS PRN
Status: DISCONTINUED | OUTPATIENT
Start: 2021-01-01 | End: 2021-01-01 | Stop reason: HOSPADM

## 2021-01-01 RX ORDER — WARFARIN SODIUM 2 MG/1
2.5 TABLET ORAL DAILY
COMMUNITY

## 2021-01-01 RX ORDER — ACETAMINOPHEN 325 MG/1
650 TABLET ORAL EVERY 6 HOURS PRN
COMMUNITY
Start: 2021-01-01

## 2021-01-01 RX ORDER — MEMANTINE HYDROCHLORIDE 10 MG/1
10 TABLET ORAL 2 TIMES DAILY
Status: DISCONTINUED | OUTPATIENT
Start: 2021-01-01 | End: 2021-01-01 | Stop reason: HOSPADM

## 2021-01-01 RX ORDER — GLIMEPIRIDE 2 MG/1
4 TABLET ORAL 2 TIMES DAILY
COMMUNITY
End: 2021-01-01

## 2021-01-01 RX ORDER — BUMETANIDE 0.25 MG/ML
1 INJECTION, SOLUTION INTRAMUSCULAR; INTRAVENOUS 2 TIMES DAILY
Status: DISCONTINUED | OUTPATIENT
Start: 2021-01-01 | End: 2021-01-01 | Stop reason: HOSPADM

## 2021-01-01 RX ORDER — BENAZEPRIL HYDROCHLORIDE 10 MG/1
40 TABLET ORAL DAILY
Status: DISCONTINUED | OUTPATIENT
Start: 2021-01-01 | End: 2021-01-01 | Stop reason: CLARIF

## 2021-01-01 RX ORDER — GLIPIZIDE 5 MG/1
5 TABLET ORAL
Status: DISCONTINUED | OUTPATIENT
Start: 2021-01-01 | End: 2021-01-01 | Stop reason: HOSPADM

## 2021-01-01 RX ORDER — WARFARIN SODIUM 3 MG/1
2 TABLET ORAL DAILY
COMMUNITY
End: 2021-01-01

## 2021-01-01 RX ORDER — WARFARIN SODIUM 3 MG/1
3 TABLET ORAL DAILY
Status: DISCONTINUED | OUTPATIENT
Start: 2021-01-01 | End: 2021-01-01 | Stop reason: HOSPADM

## 2021-01-01 RX ORDER — HYDRALAZINE HYDROCHLORIDE 50 MG/1
50 TABLET, FILM COATED ORAL EVERY 8 HOURS SCHEDULED
Status: DISCONTINUED | OUTPATIENT
Start: 2021-01-01 | End: 2021-01-01

## 2021-01-01 RX ORDER — BUMETANIDE 1 MG/1
1 TABLET ORAL DAILY
Qty: 30 TABLET | Refills: 0 | Status: SHIPPED | OUTPATIENT
Start: 2021-01-01

## 2021-01-01 RX ORDER — LISINOPRIL 40 MG/1
40 TABLET ORAL DAILY
Qty: 30 TABLET | Refills: 0 | Status: SHIPPED | OUTPATIENT
Start: 2021-01-01 | End: 2021-01-01

## 2021-01-01 RX ORDER — EMPAGLIFLOZIN 10 MG/1
10 TABLET, FILM COATED ORAL DAILY
Status: ON HOLD | COMMUNITY
End: 2022-01-01

## 2021-01-01 RX ADMIN — HYDRALAZINE HYDROCHLORIDE 100 MG: 50 TABLET, FILM COATED ORAL at 14:04

## 2021-01-01 RX ADMIN — HYDRALAZINE HYDROCHLORIDE 25 MG: 25 TABLET, FILM COATED ORAL at 06:08

## 2021-01-01 RX ADMIN — MEMANTINE HYDROCHLORIDE 10 MG: 10 TABLET, FILM COATED ORAL at 21:19

## 2021-01-01 RX ADMIN — GLIPIZIDE 5 MG: 5 TABLET ORAL at 16:29

## 2021-01-01 RX ADMIN — MEMANTINE HYDROCHLORIDE 10 MG: 10 TABLET, FILM COATED ORAL at 08:39

## 2021-01-01 RX ADMIN — INSULIN LISPRO 2 UNITS: 100 INJECTION, SOLUTION INTRAVENOUS; SUBCUTANEOUS at 08:10

## 2021-01-01 RX ADMIN — DONEPEZIL HYDROCHLORIDE 10 MG: 5 TABLET, FILM COATED ORAL at 11:53

## 2021-01-01 RX ADMIN — GLIPIZIDE 5 MG: 5 TABLET ORAL at 06:00

## 2021-01-01 RX ADMIN — LISINOPRIL 40 MG: 20 TABLET ORAL at 08:10

## 2021-01-01 RX ADMIN — POTASSIUM CHLORIDE 20 MEQ: 20 TABLET, EXTENDED RELEASE ORAL at 08:09

## 2021-01-01 RX ADMIN — MEMANTINE HYDROCHLORIDE 10 MG: 10 TABLET, FILM COATED ORAL at 10:50

## 2021-01-01 RX ADMIN — GLIPIZIDE 5 MG: 5 TABLET ORAL at 06:08

## 2021-01-01 RX ADMIN — WARFARIN SODIUM 3 MG: 3 TABLET ORAL at 16:29

## 2021-01-01 RX ADMIN — GLIPIZIDE 5 MG: 5 TABLET ORAL at 16:40

## 2021-01-01 RX ADMIN — INSULIN LISPRO 1 UNITS: 100 INJECTION, SOLUTION INTRAVENOUS; SUBCUTANEOUS at 21:20

## 2021-01-01 RX ADMIN — INSULIN LISPRO 1 UNITS: 100 INJECTION, SOLUTION INTRAVENOUS; SUBCUTANEOUS at 21:34

## 2021-01-01 RX ADMIN — BUMETANIDE 1 MG: 0.25 INJECTION INTRAMUSCULAR; INTRAVENOUS at 08:10

## 2021-01-01 RX ADMIN — BUMETANIDE 1 MG: 0.25 INJECTION, SOLUTION INTRAMUSCULAR; INTRAVENOUS at 21:59

## 2021-01-01 RX ADMIN — DONEPEZIL HYDROCHLORIDE 10 MG: 5 TABLET, FILM COATED ORAL at 08:09

## 2021-01-01 RX ADMIN — HYDRALAZINE HYDROCHLORIDE 50 MG: 50 TABLET, FILM COATED ORAL at 13:48

## 2021-01-01 RX ADMIN — INSULIN LISPRO 2 UNITS: 100 INJECTION, SOLUTION INTRAVENOUS; SUBCUTANEOUS at 11:53

## 2021-01-01 RX ADMIN — LISINOPRIL 40 MG: 20 TABLET ORAL at 10:59

## 2021-01-01 RX ADMIN — HYDRALAZINE HYDROCHLORIDE 100 MG: 50 TABLET, FILM COATED ORAL at 06:00

## 2021-01-01 RX ADMIN — BUMETANIDE 1 MG: 0.25 INJECTION INTRAMUSCULAR; INTRAVENOUS at 08:40

## 2021-01-01 RX ADMIN — POTASSIUM CHLORIDE 20 MEQ: 20 TABLET, EXTENDED RELEASE ORAL at 16:40

## 2021-01-01 RX ADMIN — BUMETANIDE 1 MG: 0.25 INJECTION INTRAMUSCULAR; INTRAVENOUS at 09:48

## 2021-01-01 RX ADMIN — POTASSIUM CHLORIDE 20 MEQ: 20 TABLET, EXTENDED RELEASE ORAL at 08:40

## 2021-01-01 RX ADMIN — LISINOPRIL 40 MG: 20 TABLET ORAL at 08:39

## 2021-01-01 RX ADMIN — INSULIN LISPRO 2 UNITS: 100 INJECTION, SOLUTION INTRAVENOUS; SUBCUTANEOUS at 12:04

## 2021-01-01 RX ADMIN — WARFARIN SODIUM 3 MG: 3 TABLET ORAL at 16:40

## 2021-01-01 RX ADMIN — POTASSIUM CHLORIDE 20 MEQ: 20 TABLET, EXTENDED RELEASE ORAL at 09:32

## 2021-01-01 RX ADMIN — DONEPEZIL HYDROCHLORIDE 10 MG: 5 TABLET, FILM COATED ORAL at 10:50

## 2021-01-01 RX ADMIN — BUMETANIDE 1 MG: 0.25 INJECTION INTRAMUSCULAR; INTRAVENOUS at 21:35

## 2021-01-01 RX ADMIN — POTASSIUM CHLORIDE 20 MEQ: 20 TABLET, EXTENDED RELEASE ORAL at 16:29

## 2021-01-01 RX ADMIN — INSULIN LISPRO 2 UNITS: 100 INJECTION, SOLUTION INTRAVENOUS; SUBCUTANEOUS at 16:40

## 2021-01-01 RX ADMIN — HYDRALAZINE HYDROCHLORIDE 100 MG: 50 TABLET, FILM COATED ORAL at 21:36

## 2021-01-01 RX ADMIN — HYDRALAZINE HYDROCHLORIDE 25 MG: 25 TABLET, FILM COATED ORAL at 21:19

## 2021-01-01 RX ADMIN — POTASSIUM CHLORIDE 20 MEQ: 20 TABLET, EXTENDED RELEASE ORAL at 16:39

## 2021-01-01 RX ADMIN — HYDRALAZINE HYDROCHLORIDE 25 MG: 25 TABLET, FILM COATED ORAL at 10:59

## 2021-01-01 RX ADMIN — MEMANTINE HYDROCHLORIDE 10 MG: 10 TABLET, FILM COATED ORAL at 21:35

## 2021-01-01 RX ADMIN — BUMETANIDE 1 MG: 0.25 INJECTION INTRAMUSCULAR; INTRAVENOUS at 21:19

## 2021-01-01 RX ADMIN — MEMANTINE HYDROCHLORIDE 10 MG: 10 TABLET, FILM COATED ORAL at 08:09

## 2021-01-01 RX ADMIN — HYDRALAZINE HYDROCHLORIDE 25 MG: 25 TABLET, FILM COATED ORAL at 14:20

## 2021-01-01 RX ADMIN — HYDRALAZINE HYDROCHLORIDE 5 MG: 20 INJECTION INTRAMUSCULAR; INTRAVENOUS at 21:58

## 2021-01-01 RX ADMIN — INSULIN LISPRO 2 UNITS: 100 INJECTION, SOLUTION INTRAVENOUS; SUBCUTANEOUS at 16:41

## 2021-01-01 SDOH — ECONOMIC STABILITY: FOOD INSECURITY: WITHIN THE PAST 12 MONTHS, YOU WORRIED THAT YOUR FOOD WOULD RUN OUT BEFORE YOU GOT MONEY TO BUY MORE.: NEVER TRUE

## 2021-01-01 SDOH — ECONOMIC STABILITY: FOOD INSECURITY: WITHIN THE PAST 12 MONTHS, THE FOOD YOU BOUGHT JUST DIDN'T LAST AND YOU DIDN'T HAVE MONEY TO GET MORE.: NEVER TRUE

## 2021-01-01 ASSESSMENT — PAIN SCALES - GENERAL
PAINLEVEL_OUTOF10: 0

## 2021-01-01 ASSESSMENT — SOCIAL DETERMINANTS OF HEALTH (SDOH): HOW HARD IS IT FOR YOU TO PAY FOR THE VERY BASICS LIKE FOOD, HOUSING, MEDICAL CARE, AND HEATING?: NOT HARD AT ALL

## 2021-01-01 ASSESSMENT — PATIENT HEALTH QUESTIONNAIRE - PHQ9
SUM OF ALL RESPONSES TO PHQ9 QUESTIONS 1 & 2: 0
SUM OF ALL RESPONSES TO PHQ QUESTIONS 1-9: 0
SUM OF ALL RESPONSES TO PHQ QUESTIONS 1-9: 0
2. FEELING DOWN, DEPRESSED OR HOPELESS: 0
1. LITTLE INTEREST OR PLEASURE IN DOING THINGS: 0
SUM OF ALL RESPONSES TO PHQ QUESTIONS 1-9: 0

## 2021-01-01 ASSESSMENT — EJECTION FRACTION
EF_VALUE: 60-65%
EF_SOURCE: 2D ECHO
EF_VALUE: 60-65%
EF_SOURCE: 2D ECHO
EF_VALUE: 60-65%

## 2021-01-19 ENCOUNTER — OFFICE VISIT (OUTPATIENT)
Dept: CARDIOLOGY CLINIC | Age: 86
End: 2021-01-19
Payer: MEDICARE

## 2021-01-19 VITALS
HEIGHT: 68 IN | BODY MASS INDEX: 36.4 KG/M2 | SYSTOLIC BLOOD PRESSURE: 124 MMHG | WEIGHT: 240.2 LBS | DIASTOLIC BLOOD PRESSURE: 68 MMHG | RESPIRATION RATE: 16 BRPM | HEART RATE: 57 BPM

## 2021-01-19 DIAGNOSIS — I26.94 MULTIPLE SUBSEGMENTAL PULMONARY EMBOLI WITHOUT ACUTE COR PULMONALE (HCC): Primary | ICD-10-CM

## 2021-01-19 PROCEDURE — 4040F PNEUMOC VAC/ADMIN/RCVD: CPT | Performed by: INTERNAL MEDICINE

## 2021-01-19 PROCEDURE — 1036F TOBACCO NON-USER: CPT | Performed by: INTERNAL MEDICINE

## 2021-01-19 PROCEDURE — 99214 OFFICE O/P EST MOD 30 MIN: CPT | Performed by: INTERNAL MEDICINE

## 2021-01-19 PROCEDURE — G8417 CALC BMI ABV UP PARAM F/U: HCPCS | Performed by: INTERNAL MEDICINE

## 2021-01-19 PROCEDURE — 93000 ELECTROCARDIOGRAM COMPLETE: CPT | Performed by: INTERNAL MEDICINE

## 2021-01-19 PROCEDURE — G8427 DOCREV CUR MEDS BY ELIG CLIN: HCPCS | Performed by: INTERNAL MEDICINE

## 2021-01-19 PROCEDURE — G8484 FLU IMMUNIZE NO ADMIN: HCPCS | Performed by: INTERNAL MEDICINE

## 2021-01-19 PROCEDURE — 1123F ACP DISCUSS/DSCN MKR DOCD: CPT | Performed by: INTERNAL MEDICINE

## 2021-01-19 RX ORDER — WARFARIN SODIUM 3 MG/1
3 TABLET ORAL DAILY
COMMUNITY
Start: 2021-01-15 | End: 2021-01-01

## 2021-01-19 RX ORDER — POTASSIUM CHLORIDE 20 MEQ/1
TABLET, EXTENDED RELEASE ORAL
Status: ON HOLD | COMMUNITY
Start: 2021-01-02 | End: 2021-01-01 | Stop reason: SDUPTHER

## 2021-01-19 RX ORDER — WARFARIN SODIUM 3 MG/1
TABLET ORAL DAILY
COMMUNITY
End: 2021-01-19 | Stop reason: ALTCHOICE

## 2021-01-19 NOTE — PROGRESS NOTES
OUTPATIENT CARDIOLOGY FOLLOW-UP    Name: Chong Scott    Age: 80 y.o. Primary Care Physician: Kellie Baez MD    Date of Service: 1/19/2021    Chief Complaint:   Chief Complaint   Patient presents with    6 Month Follow-Up       Interim History:   Mr. Phoebe Sequeira is a 59-year-old gentleman with history of severe dementia, type 2 diabetes not well controlled, hypertension, hypertriglyceridemia on TriCor, remote history of pericarditis, former smoker and history of hip replacement in 2018 who presented to the hospital in April 2020 with complaints of increasing dyspnea for the past 2 weeks. He was seen as a new consult and was diagnosed with second-degree AV block Mobitz type I, bilateral pulmonary emboli with a dilated RV hypertension and mixed hyperlipidemia. Patient was already on Eliquis for pulmonary embolism he was tested negative for COVID-19 pneumonia. Patient was recommended no pacemaker based on EKG findings. He was seen in the office on 7/22/2020. Since his last visit, he has not had any further hospitalizations or ER visits. He is compliant with medications, as well as salt and fluid intake. He does not take any over-the-counter arthritis medications. No new cardiac complaints since last cardiology evaluation. He denies recent chest pain, SOB, palpitations, lightheadedness, dizziness, syncope, PND, or orthopnea. AF on EKG.     Review of Systems:   Cardiac: As per HPI  General: No fever, chills  Pulmonary: As per HPI  HEENT: No visual disturbances, difficult swallowing  GI: No nausea, vomiting  Endocrine: No thyroid disease or DM  Musculoskeletal: DUCKWORTH x 4, no focal motor deficits  Skin: Intact, no rashes  Neuro/Psych: No headache or seizures    Past Medical History:  Past Medical History:   Diagnosis Date    Hyperlipidemia     Pericarditis        Past Surgical History:  Past Surgical History:   Procedure Laterality Date    JOINT REPLACEMENT      VASECTOMY         Family History:  No family history on file. Social History:  Social History     Socioeconomic History    Marital status:       Spouse name: Not on file    Number of children: Not on file    Years of education: Not on file    Highest education level: Not on file   Occupational History    Not on file   Social Needs    Financial resource strain: Not on file    Food insecurity     Worry: Not on file     Inability: Not on file    Transportation needs     Medical: Not on file     Non-medical: Not on file   Tobacco Use    Smoking status: Former Smoker     Packs/day: 2.50     Years: 18.00     Pack years: 45.00     Start date: 1972     Quit date: 1990     Years since quittin.0    Smokeless tobacco: Never Used   Substance and Sexual Activity    Alcohol use: Not Currently    Drug use: Never    Sexual activity: Not on file   Lifestyle    Physical activity     Days per week: Not on file     Minutes per session: Not on file    Stress: Not on file   Relationships    Social connections     Talks on phone: Not on file     Gets together: Not on file     Attends Druze service: Not on file     Active member of club or organization: Not on file     Attends meetings of clubs or organizations: Not on file     Relationship status: Not on file    Intimate partner violence     Fear of current or ex partner: Not on file     Emotionally abused: Not on file     Physically abused: Not on file     Forced sexual activity: Not on file   Other Topics Concern    Not on file   Social History Narrative    Not on file       Allergies:  No Known Allergies    Current Medications:  Current Outpatient Medications   Medication Sig Dispense Refill    warfarin (COUMADIN) 3 MG tablet       potassium chloride (KLOR-CON M) 20 MEQ extended release tablet       apixaban (ELIQUIS) 5 MG TABS tablet Take 1 tablet by mouth 2 times daily Lot # [id number], exp 2022, 3 boxes 42 tablet 0    Apoaequorin (PREVAGEN EXTRA STRENGTH) 20 MG CAPS Take 1 capsule by mouth daily 90 capsule 3    donepezil (ARICEPT) 10 MG tablet Take 1 tablet by mouth daily 30 tablet 5    magnesium hydroxide (MILK OF MAGNESIA) 400 MG/5ML suspension Take 30 mLs by mouth daily as needed for Constipation      ARTIFICIAL TEAR OP Apply to eye      acetaminophen (TYLENOL) 500 MG tablet Take 500 mg by mouth every 6 hours as needed for Pain      amLODIPine (NORVASC) 5 MG tablet Take 5 mg by mouth daily      glimepiride (AMARYL) 2 MG tablet Take 1 tablet by mouth Daily with supper (Patient taking differently: Take 2 mg by mouth 2 times daily ) 30 tablet 3    vitamin B-12 (CYANOCOBALAMIN) 1000 MCG tablet Take 1,500 mcg by mouth daily      memantine (NAMENDA) 10 MG tablet Take 10 mg by mouth 2 times daily      benazepril (LOTENSIN) 40 MG tablet Take 40 mg by mouth 2 times daily      furosemide (LASIX) 40 MG tablet Take 40 mg by mouth daily      fenofibrate (TRICOR) 145 MG tablet Take 145 mg by mouth daily      donepezil (ARICEPT) 5 MG tablet Take 1 tablet by mouth daily (with breakfast) (Patient not taking: Reported on 1/19/2021) 90 tablet 3    apixaban (ELIQUIS DVT/PE STARTER PACK) 5 MG TABS tablet Take 10 mg (2 tablets) orally twice daily for 3 days, then take 5 mg (1 tablet) orally twice daily thereafter. (Patient not taking: Reported on 1/19/2021) 74 tablet 0     No current facility-administered medications for this visit. Physical Exam:  Resp 16   Ht 5' 8\" (1.727 m)   Wt 240 lb 3.2 oz (109 kg)   BMI 36.52 kg/m²   Wt Readings from Last 3 Encounters:   01/19/21 240 lb 3.2 oz (109 kg)   07/22/20 231 lb 6.4 oz (105 kg)   04/19/20 249 lb (112.9 kg)     Appearance: Awake, alert and oriented x 3, no acute respiratory distress  Skin: Intact, no rash  Head: Normocephalic, atraumatic  Eyes: EOMI, no conjunctival erythema  ENMT: No pharyngeal erythema, MMM, no rhinorrhea  Neck: Supple, no elevated JVP, no carotid bruits  Lungs: Clear to auscultation bilaterally.  No wheezes, rales, or rhonchi. Cardiac: irregularly irregular rate and rhythm, +S1S2, no murmurs apparent  Abdomen: Soft, nontender, +bowel sounds  Extremities: Moves all extremities x 4, 1 +lower extremity edema  Neurologic: No focal motor deficits apparent, normal mood and affect, alert and oriented x 1  Peripheral Pulses: Intact posterior tibial pulses bilaterally    Laboratory Tests:  Lab Results   Component Value Date    CREATININE 1.6 (H) 05/26/2020    BUN 21 05/26/2020     05/26/2020    K 3.8 05/26/2020     05/26/2020    CO2 25 05/26/2020     Lab Results   Component Value Date    MG 2.1 04/21/2020     Lab Results   Component Value Date    WBC 9.2 04/20/2020    HGB 15.1 04/20/2020    HCT 46.0 04/20/2020    MCV 92.7 04/20/2020     04/23/2020     Lab Results   Component Value Date    ALT 12 04/19/2020    AST 39 04/19/2020    ALKPHOS 36 (L) 04/19/2020    BILITOT 0.3 04/19/2020     Lab Results   Component Value Date    TROPONINI 0.03 04/19/2020     Lab Results   Component Value Date    INR 1.1 04/19/2020    INR 1.0 03/06/2017    PROTIME 12.7 (H) 04/19/2020    PROTIME 10.9 03/06/2017     No results found for: TSHFT4, TSH  Lab Results   Component Value Date    LABA1C 8.6 (H) 04/19/2020     No results found for: EAG  No results found for: CHOL  No results found for: TRIG  No results found for: HDL  No results found for: LDLCALC, LDLCHOLESTEROL  No results found for: LABVLDL, VLDL  No results found for: CHOLHDLRATIO    Cardiac Tests:  ECG: Atrial fibrillation , left anterior fascicular block, abnormal EKG. Echocardiogram 4/19/2020:   Summary   Normal left ventricle size and systolic function. Ejection fraction is visually estimated at 60-65%. Frequent ectopy will   affect the evaluation of LV systolic function. Indeterminate diastolic function. No regional wall motion abnormalities seen. Mild concentric left ventricular hypertrophy. Indeterminate diastolic function.    Moderately dilated right ventricle. Right ventricle global systolic   function is normal . TAPSE 26 mm. Moderately enlarged right atrium size. No systolic mitral regurgitation noted. No hemodynamically significant aortic stenosis is present. Mild tricuspid regurgitation. RVSP is 60 mmHg. Pulmonary hypertension is moderate . No evidence for hemodynamically significant pericardial effusion. No previous echo for comparison. CTA chest 4/19/2020:  Pulmonary embolism bilateral upper lobes, the right middle lobe and   the bilateral lower lobes. Largest thrombus is within the distal right   main pulmonary artery which almost occludes this vessel. I discussed   these findings with Dr. Yolanda Martínez the emergency room attending.       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 The coronal   and sagittal views do not confirm this as acute dissection. I   discussed these findings with Dr. Yolanda Martínez the emergency room   attending.       14 millimeter left lower lobe nodule which is somewhat suspicious. A   follow-up CT of the chest in a few months is recommended.       Diffuse interstitial lung disease and peripheral honeycombing.       Scattered patchy groundglass densities may reflect manifestations of   interstitial disease or acute infection.          Stress test:        Cardiac catheterization:     The ASCVD Risk score (Katty Joseph., et al., 2013) failed to calculate for the following reasons: The 2013 ASCVD risk score is only valid for ages 36 to 78        ASSESSMENT:  · Episodes of second-degree AV block Mobitz type I, first-degree AV block with blocked  sinus beats, no indication for pacemaker  · New onset Afib based on EKG with controlled rate. · Bilateral pulmonary emboli with mild RV strain with moderately dilated RV and RV function is well preserved.   · Moderate pulmonary hypertension secondary submassive PE   · Hypertension, well controlled on lisinopril  · Type 2 diabetes not well controlled  · Mixed hyperlipidemia on fenofibrate.    · Severe dementia  · Mild peripheral edema secondary to Norvasc. Plan:   · Continue benazepril and amlodipine for hypertension  · Continue Warfarin for anticoagulation  · Avoid beta-blockers and calcium channel blockers due to underlying first-degree AV block and prior history of Mobitz type I block. · No indication for pacemaker at this time. · Patient and his daughter were advised to call us if he develops any dizzy spells or syncope. · Follow-up with me in 6 months. The patient's current medication list, allergies, problem list and results of all previously ordered testing were reviewed at today's visit.     Raghavendra Mercado MD  [facility]) Cardiology

## 2021-08-30 PROBLEM — I50.9 ACUTE CONGESTIVE HEART FAILURE (HCC): Status: ACTIVE | Noted: 2021-01-01

## 2021-08-30 NOTE — ED PROVIDER NOTES
Department of Emergency Medicine   ED  Provider Note  Admit Date/RoomTime: 8/30/2021  4:41 PM  ED Room: 6502/6502-B          History of Present Illness:  8/30/21, Time: 5:14 PM EDT  Chief Complaint   Patient presents with    Congestive Heart Failure     increased leg swelling, weight gain                Yohan Burgos is a 80 y.o. male presenting to the ED for sob and weight gain, beginning unknown time . The complaint has been persistent, mild in severity, and worsened by nothing. Patient presents from the [place] via EMS for increased weight gain and leg swelling. Patient has minimal complaints. States mild shortness of breath but is unsure of his change from his baseline. Reports he had some form of testing done at the [place] and was sent in. No records accompany the patient. States he stays at assisted living. Believes he has good compliance with his medicines but is unsure. ENCOUNTER LIMITATION:    Please note that the HPI, ROS, Past History, and Physical Examination are limited due to this patients mental status. Review of Systems:   A complete review of systems was unable to be performed secondary to the limitations noted above            --------------------------------------------- PAST HISTORY ---------------------------------------------  Past Medical History:  has a past medical history of Hyperlipidemia and Pericarditis. Past Surgical History:  has a past surgical history that includes Vasectomy and joint replacement. Social History:  reports that he quit smoking about 31 years ago. He started smoking about 49 years ago. He has a 45.00 pack-year smoking history. He has never used smokeless tobacco. He reports previous alcohol use. He reports that he does not use drugs. Family History: family history is not on file. . Unless otherwise noted, family history is non contributory    The patients home medications have been reviewed.     Allergies: Patient has no known Nasopharyngeal Swab   Result Value Ref Range    SARS-CoV-2, NAAT Not Detected Not Detected   Troponin   Result Value Ref Range    Troponin, High Sensitivity 41 (H) 0 - 11 ng/L   CBC Auto Differential   Result Value Ref Range    WBC 6.7 4.5 - 11.5 E9/L    RBC 4.71 3.80 - 5.80 E12/L    Hemoglobin 13.9 12.5 - 16.5 g/dL    Hematocrit 43.0 37.0 - 54.0 %    MCV 91.3 80.0 - 99.9 fL    MCH 29.5 26.0 - 35.0 pg    MCHC 32.3 32.0 - 34.5 %    RDW 13.9 11.5 - 15.0 fL    Platelets 745 513 - 700 E9/L    MPV 12.1 (H) 7.0 - 12.0 fL    Neutrophils % 48.9 43.0 - 80.0 %    Immature Granulocytes % 0.3 0.0 - 5.0 %    Lymphocytes % 33.6 20.0 - 42.0 %    Monocytes % 11.7 2.0 - 12.0 %    Eosinophils % 4.0 0.0 - 6.0 %    Basophils % 1.5 0.0 - 2.0 %    Neutrophils Absolute 3.26 1.80 - 7.30 E9/L    Immature Granulocytes # 0.02 E9/L    Lymphocytes Absolute 2.24 1.50 - 4.00 E9/L    Monocytes Absolute 0.78 0.10 - 0.95 E9/L    Eosinophils Absolute 0.27 0.05 - 0.50 E9/L    Basophils Absolute 0.10 0.00 - 0.20 E9/L   Comprehensive Metabolic Panel   Result Value Ref Range    Sodium 139 132 - 146 mmol/L    Potassium 3.9 3.5 - 5.0 mmol/L    Chloride 107 98 - 107 mmol/L    CO2 23 22 - 29 mmol/L    Anion Gap 9 7 - 16 mmol/L    Glucose 144 (H) 74 - 99 mg/dL    BUN 13 6 - 23 mg/dL    CREATININE 1.0 0.7 - 1.2 mg/dL    GFR Non-African American >60 >=60 mL/min/1.73    GFR African American >60     Calcium 8.1 (L) 8.6 - 10.2 mg/dL    Total Protein 6.5 6.4 - 8.3 g/dL    Albumin 3.2 (L) 3.5 - 5.2 g/dL    Total Bilirubin 0.4 0.0 - 1.2 mg/dL    Alkaline Phosphatase 49 40 - 129 U/L    ALT 9 0 - 40 U/L    AST 27 0 - 39 U/L   Magnesium   Result Value Ref Range    Magnesium 1.9 1.6 - 2.6 mg/dL   Brain Natriuretic Peptide   Result Value Ref Range    Pro- (H) 0 - 450 pg/mL   Lactic Acid, Plasma   Result Value Ref Range    Lactic Acid 1.4 0.5 - 2.2 mmol/L   Protime-INR   Result Value Ref Range    Protime 27.0 (H) 9.3 - 12.4 sec    INR 2.5    Troponin   Result Value ------------------------- NURSING NOTES AND VITALS REVIEWED ---------------------------   The nursing notes within the ED encounter and vital signs as below have been reviewed by myself  BP (!) 178/69   Pulse 80   Temp 97 °F (36.1 °C) (Temporal)   Resp 18   Ht 5' 8\" (1.727 m)   Wt 233 lb 6.4 oz (105.9 kg)   SpO2 97%   BMI 35.49 kg/m²     Oxygen Saturation Interpretation: Normal    The cardiac monitor revealed A fib with slow vr with a heart rate in the 40s as interpreted by me. The cardiac monitor was ordered secondary to the patient's heart rate and to monitor the patient for dysrhythmia. CPT 66379    The patients available past medical records and past encounters were reviewed.         ------------------------------ ED COURSE/MEDICAL DECISION MAKING----------------------  Medications   magnesium hydroxide (MILK OF MAGNESIA) 400 MG/5ML suspension 30 mL (has no administration in time range)   memantine (NAMENDA) tablet 10 mg (10 mg Oral Given 9/1/21 0839)   potassium chloride (KLOR-CON M) extended release tablet 20 mEq (20 mEq Oral Given 9/1/21 0840)   donepezil (ARICEPT) tablet 10 mg (10 mg Oral Given 9/1/21 1153)   warfarin (COUMADIN) tablet 3 mg (3 mg Oral Given 8/31/21 1640)   bumetanide (BUMEX) injection 1 mg (1 mg IntraVENous Given 9/1/21 0840)   insulin lispro (HUMALOG) injection vial 0-12 Units (2 Units SubCUTAneous Given 9/1/21 1153)   insulin lispro (HUMALOG) injection vial 0-6 Units (1 Units SubCUTAneous Given 8/31/21 2120)   lisinopril (PRINIVIL;ZESTRIL) tablet 40 mg (40 mg Oral Given 9/1/21 0839)   glipiZIDE (GLUCOTROL) tablet 5 mg (5 mg Oral Given 9/1/21 0608)   glucose (GLUTOSE) 40 % oral gel 15 g (has no administration in time range)   dextrose 50 % IV solution (has no administration in time range)   glucagon (rDNA) injection 1 mg (has no administration in time range)   dextrose 5 % solution (has no administration in time range)   hydrALAZINE (APRESOLINE) tablet 100 mg (has no administration in time range)   hydrALAZINE (APRESOLINE) injection 5 mg (5 mg IntraVENous Given 8/30/21 2158)   bumetanide (BUMEX) injection 1 mg (1 mg IntraVENous Given 8/30/21 2159)                    Medical Decision Making:     I, Dr. Shahram Weinstein am the primary provider of record    Patient sent in for bradycardia concern for acutely decompensated heart failure. Patient had minimal complaints. When his family arrived they state he has been having ongoing leg swelling dyspnea on exertion for a few months particularly worsening the last several days. Evidence of acutely decompensated heart failure. My successor spoke with medicine patient was admitted      Re-Evaluations:          Re-evaluation. Patients symptoms show no change  Repeat physical examination is not changed        This patient's ED course included: a personal history and physicial examination, re-evaluation prior to disposition, IV medications, cardiac monitoring, continuous pulse oximetry and complex medical decision making and emergency management    This patient has been closely monitored during their ED course. Consultations:  Internal Medicine       Critical Care:         Counseling: The emergency provider has spoken with the patient and Family and discussed todays results, in addition to providing specific details for the plan of care and counseling regarding the diagnosis and prognosis. Questions are answered at this time and they are agreeable with the plan.       --------------------------------- IMPRESSION AND DISPOSITION ---------------------------------    IMPRESSION  1. Acute congestive heart failure, unspecified heart failure type ([address]    2. SOB (shortness of breath) on exertion    3. Chronic anticoagulation        DISPOSITION  Disposition: Admit  Patient condition is stable        NOTE: This report was transcribed using voice recognition software.  Every effort was made to ensure accuracy; however, inadvertent computerized transcription errors may be present       Bhupinder Correa,   09/01/21 1503

## 2021-08-31 PROBLEM — Z79.01 CHRONIC ANTICOAGULATION: Chronic | Status: ACTIVE | Noted: 2021-01-01

## 2021-08-31 PROBLEM — F03.90 DEMENTIA (HCC): Chronic | Status: ACTIVE | Noted: 2021-01-01

## 2021-08-31 PROBLEM — E66.9 OBESITY: Chronic | Status: ACTIVE | Noted: 2020-07-27

## 2021-08-31 PROBLEM — I27.20 PULMONARY HTN (HCC): Chronic | Status: ACTIVE | Noted: 2021-01-01

## 2021-08-31 PROBLEM — I50.31 ACUTE DIASTOLIC CHF (CONGESTIVE HEART FAILURE) (HCC): Status: ACTIVE | Noted: 2021-01-01

## 2021-08-31 PROBLEM — E78.5 HYPERLIPIDEMIA: Chronic | Status: ACTIVE | Noted: 2021-01-01

## 2021-08-31 PROBLEM — Z86.711 HISTORY OF PULMONARY EMBOLISM: Chronic | Status: ACTIVE | Noted: 2021-01-01

## 2021-08-31 PROBLEM — E66.9 OBESITY: Status: ACTIVE | Noted: 2020-07-27

## 2021-08-31 PROBLEM — E11.9 TYPE 2 DIABETES MELLITUS, WITHOUT LONG-TERM CURRENT USE OF INSULIN (HCC): Chronic | Status: ACTIVE | Noted: 2021-01-01

## 2021-08-31 NOTE — PROGRESS NOTES
Physical Therapy  Physical Therapy Initial Assessment     Name: Samantha Luong  : 1934  MRN: [id number]      Date of Service: 2021    Evaluating PT:  Rojelio Jonesadriano., PT, DPT [id number]    Room #:  1885/4292-U  Diagnosis:  Acute congestive heart failure, unspecified heart failure type ([facility] [address] [I50.9]  PMHx/PSHx:  HLD, pericarditis  Procedure/Surgery:  none  Precautions:  Falls, Cardiac, bed alarm  Equipment Needs:  TBD    SUBJECTIVE:    Pt lives at [facility], reports he was independent with functional mobility prior to admission, does not use device. OBJECTIVE:   Initial Evaluation  Date: 21 Treatment Short Term/ Long Term   Goals   AM-PAC 6 Clicks      Was pt agreeable to Eval/treatment? yes     Does pt have pain? no     Bed Mobility  Rolling: Babs  Supine to sit: Babs  Sit to supine: sup  Scooting: sup  Rolling: mod i  Supine to sit:  Mod i  Sit to supine: Mod i  Scooting: Mod i   Transfers Sit to stand: SBA  Stand to sit: SBA  Stand pivot: NT  Sit to stand: mod i  Stand to sit:  Mod i  Stand pivot: Mod i   Ambulation    120', no AD, SBA  250', no AD, mod i   Stair negotiation: ascended and descended NT  4 steps, HR, mod i   ROM BUE:  WNL  BLE:  WNL     Strength BUE:  WNL  BLE:  WNL  n/a   Balance Sitting EOB:  sup  Dynamic Standing:  SBA  Sitting EOB:  IND  Dynamic Standing:   Mod i     Pt is A & O x 3, oriented to person, place, and date  Sensation:  Pt denies numbness and tingling to extremities  Edema:  unremarkable    Vitals: pre ambulation- HR ranging form 42-60 BPM, spo2 98%, BP taken with vitals cart on /199 mmHg, manually taken in /58 mmHg  Post ambulation HR 92 BPM, spo2 98% on room air      Patient education  Pt educated on role of PT, tx, gait, balance, current status and POC, d/c planning    Patient response to education:   Pt verbalized understanding Pt demonstrated skill Pt requires further education in this area   yes partial All areas ASSESSMENT:    Conditions Requiring Skilled Therapeutic Intervention:    []Decreased strength     []Decreased ROM  [x]Decreased functional mobility  [x]Decreased balance   [x]Decreased endurance   []Decreased posture  []Decreased sensation  []Decreased coordination   []Decreased vision  [x]Decreased safety awareness   []Increased pain       Comments:  Pt supine upon arrival, agreeable to therapy session. RN clearance prior to session. Pt requiring light Babs to complete bed mobility this session due to core strength deficits. Upon sitting, pt showing fair-good balance at EOB while vitals being assessed. Pt's BP highly elevated when taken with machine, so took BP manually and found to have BP WNL, Rn aware and having similar read when taken manually just prior to session. Pt able to complete STS and amb hallway at SBA this session, showing mild path deviations but no overt LOB. Pt states he has not amb in a few days and does acknowledge mild balance deficits. Pt would benefit from continued therapy services to address deficits listed above, working towards goals mentioned. Will continue to follow POC. Pt supine in bed with bed alarm on at end of session. Treatment:  Patient practiced and was instructed in the following treatment:     Bed mobility training - pt given verbal and tactile cues to facilitate proper sequencing and safety during rolling and supine>sit as well as provided with physical assistance to complete task    Sitting EOB for >5 minutes for upright tolerance, postural awareness, assessing vitals,  and BLE ROM   Gait- SBA for safety, increased time and effort to complete, slowed pace    Pt's/ family goals   1. Return home    Prognosis is good for reaching above PT goals. Patient and or family understand(s) diagnosis, prognosis, and plan of care.   yes    PHYSICAL THERAPY PLAN OF CARE:    PT POC is established based on physician order and patient diagnosis     Referring provider/PT Order:    Start   Ordering Provider    08/31/21 0800  PT eval and treat Start: 08/31/21 0800, End: 08/31/21 0800, ONE TIME, Standing Count: 1 Occurrences, R      Kaycee Ede Mora DO        Diagnosis:  Acute congestive heart failure, unspecified heart failure type ([facility] [address] [I50.9]  Specific instructions for next treatment:  Progress independence with tx and amb. Current Treatment Recommendations:     [x] Strengthening to improve independence with functional mobility   [] ROM to improve independence with functional mobility   [x] Balance Training to improve static/dynamic balance and to reduce fall risk  [x] Endurance Training to improve activity tolerance during functional mobility   [x] Transfer Training to improve safety and independence with all functional transfers   [x] Gait Training to improve gait mechanics, endurance and assess need for appropriate assistive device  [x] Stair Training in preparation for safe discharge home and/or into the community   [] Positioning to prevent skin breakdown and contractures  [] Safety and Education Training   [x] Patient/Caregiver Education   [] HEP  [] Other     PT long term treatment goals are located in above grid    Frequency of treatments: 2-5x/week x 1-2 weeks. Time in  1304  Time out  1324    Total Treatment Time  10 minutes     Evaluation Time includes thorough review of current medical information, gathering information on past medical history/social history and prior level of function, completion of standardized testing/informal observation of tasks, assessment of data and education on plan of care and goals.     CPT codes:  [x] Low Complexity PT evaluation 98947  [] Moderate Complexity PT evaluation 14626  [] High Complexity PT evaluation 78238  [] PT Re-evaluation 54010  [] Gait training 47635 0 minutes  [] Manual therapy 17634 0 minutes  [x] Therapeutic activities 10978 10 minutes  [] Therapeutic exercises 02407 0 minutes  [] Neuromuscular reeducation [id number] 0 reno Rodriguez., PT, DPT  [id number]

## 2021-08-31 NOTE — CONSULTS
I, first-degree AV block with blocked sinus beats (no indication for PPM, holding AV berry blockers)  4. DM Type II  5. HTN  6. Hypertriglyceridemia   7. History of pericarditis (~ 20 years ago)  8. Dementia   9. History of Vasectomy   10. Former smoker   6. Hx of hip replacement surgery ([facility] 2018). CARDIAC TESTING:    TTE (4/23/2020, Dr. George Loja)   Summary:   Normal left ventricle size and systolic function. Ejection fraction is visually estimated at 60-65%. Frequent ectopy will   affect the evaluation of LV systolic function. Indeterminate diastolic function. No regional wall motion abnormalities seen. Mild concentric left ventricular hypertrophy. Indeterminate diastolic function. Moderately dilated right ventricle. Right ventricle global systolic   function is normal . TAPSE 26 mm. Moderately enlarged right atrium size. No systolic mitral regurgitation noted. No hemodynamically significant aortic stenosis is present. Mild tricuspid regurgitation. RVSP is 60 mmHg. Pulmonary hypertension is moderate . No evidence for hemodynamically significant pericardial effusion. No previous echo for comparison. Past Surgical History:    Past Surgical History:   Procedure Laterality Date    JOINT REPLACEMENT      VASECTOMY         Medications Prior to admit:  Prior to Admission medications    Medication Sig Start Date End Date Taking? Authorizing Provider   triamcinolone (KENALOG) 0.1 % cream Apply topically 2 times daily Apply topically 2 times daily.    Yes Historical Provider, MD   warfarin (COUMADIN) 3 MG tablet Take 3 mg by mouth daily  1/15/21  Yes Historical Provider, MD   potassium chloride (KLOR-CON M) 20 MEQ extended release tablet  1/2/21  Yes Historical Provider, MD   Apoaequorin (PREVAGEN EXTRA STRENGTH) 20 MG CAPS Take 1 capsule by mouth daily 8/21/20  Yes Margarita Galeana MD   donepezil (ARICEPT) 10 MG tablet Take 1 tablet by mouth daily 8/21/20  Yes Margarita Galeana MD   magnesium hydroxide (MILK OF MAGNESIA) 400 MG/5ML suspension Take 30 mLs by mouth daily as needed for Constipation   Yes Historical Provider, MD   ARTIFICIAL TEAR OP Place 2 drops into the right eye every 4 hours as needed    Yes Historical Provider, MD   acetaminophen (TYLENOL) 500 MG tablet Take 1,000 mg by mouth every 6 hours as needed for Pain    Yes Historical Provider, MD   amLODIPine (NORVASC) 5 MG tablet Take 5 mg by mouth daily   Yes Historical Provider, MD   glimepiride (AMARYL) 2 MG tablet Take 1 tablet by mouth Daily with supper  Patient taking differently: Take 2 mg by mouth 2 times daily  4/24/20  Yes CARLOS MANUEL Downey   vitamin B-12 (CYANOCOBALAMIN) 1000 MCG tablet Take 1,000 mcg by mouth daily    Yes Historical Provider, MD   memantine (NAMENDA) 10 MG tablet Take 10 mg by mouth 2 times daily   Yes Historical Provider, MD   benazepril (LOTENSIN) 40 MG tablet Take 40 mg by mouth 2 times daily   Yes Historical Provider, MD   furosemide (LASIX) 40 MG tablet Take 40 mg by mouth daily   Yes Historical Provider, MD   fenofibrate (TRICOR) 145 MG tablet Take 145 mg by mouth daily   Yes Historical Provider, MD       Current Medications:    Current Facility-Administered Medications: magnesium hydroxide (MILK OF MAGNESIA) 400 MG/5ML suspension 30 mL, 30 mL, Oral, Daily PRN  memantine (NAMENDA) tablet 10 mg, 10 mg, Oral, BID  potassium chloride (KLOR-CON M) extended release tablet 20 mEq, 20 mEq, Oral, BID WC  donepezil (ARICEPT) tablet 10 mg, 10 mg, Oral, Daily  warfarin (COUMADIN) tablet 3 mg, 3 mg, Oral, Daily  hydrALAZINE (APRESOLINE) tablet 25 mg, 25 mg, Oral, 3 times per day  bumetanide (BUMEX) injection 1 mg, 1 mg, IntraVENous, BID  insulin lispro (HUMALOG) injection vial 0-12 Units, 0-12 Units, SubCUTAneous, TID WC  insulin lispro (HUMALOG) injection vial 0-6 Units, 0-6 Units, SubCUTAneous, Nightly  lisinopril (PRINIVIL;ZESTRIL) tablet 40 mg, 40 mg, Oral, Daily  glipiZIDE (GLUCOTROL) tablet 5 mg, 5 mg, Oral, BID AC  glucose (GLUTOSE) 40 % oral gel 15 g, 15 g, Oral, PRN  dextrose 50 % IV solution, 12.5 g, IntraVENous, PRN  glucagon (rDNA) injection 1 mg, 1 mg, IntraMUSCular, PRN  dextrose 5 % solution, 100 mL/hr, IntraVENous, PRN    Allergies:  Patient has no known allergies. Social History:    Tobacco former smoker, denies EtOH or illicit drug abuse. Social History     Socioeconomic History    Marital status:      Spouse name: Not on file    Number of children: Not on file    Years of education: Not on file    Highest education level: Not on file   Occupational History    Not on file   Tobacco Use    Smoking status: Former Smoker     Packs/day: 2.50     Years: 18.00     Pack years: 45.00     Start date: 1972     Quit date: 1990     Years since quittin.6    Smokeless tobacco: Never Used   Vaping Use    Vaping Use: Never used   Substance and Sexual Activity    Alcohol use: Not Currently    Drug use: Never    Sexual activity: Not on file   Other Topics Concern    Not on file   Social History Narrative    Not on file     Social Determinants of Health     Financial Resource Strain:     Difficulty of Paying Living Expenses:    Food Insecurity:     Worried About [address] [employer] in the Last Year:     [facility] in the Last Year:    Transportation Needs:     Lack of Transportation (Medical):      Lack of Transportation (Non-Medical):    Physical Activity:     Days of Exercise per Week:     Minutes of Exercise per Session:    Stress:     Feeling of Stress :    Social Connections:     Frequency of Communication with Friends and Family:     Frequency of Social Gatherings with Friends and Family:     Attends Protestant Services:     Active Member of Clubs or Organizations:     Attends Club or Organization Meetings:     Marital Status:    Intimate Partner Violence:     Fear of Current or Ex-Partner:     Emotionally Abused:     Physically Abused:     Sexually Abused:        Family History: Limited in nature given advanced age. History reviewed. No pertinent family history. REVIEW OF SYSTEMS:     · Constitutional: Denies fatigue, fevers, chills or night sweats  · Eyes: Denies visual changes or drainage  · ENT: Denies headaches or hearing loss. No mouth sores or sore throat. No epistaxis   · Cardiovascular: Denies chest pain, pressure or palpitations. + lower extremity swelling. · Respiratory: ++CURTIS, cough, orthopnea or PND. No hemoptysis   · Gastrointestinal: Denies hematemesis or anorexia. No hematochezia or melena    · Genitourinary: Denies urgency, dysuria or hematuria. · Musculoskeletal: Denies gait disturbance, weakness or joint complaints  · Integumentary: Denies rash, hives or pruritis   · Neurological: Denies dizziness, headaches or seizures. No numbness or tingling  · Psychiatric: Denies anxiety or depression. · Endocrine: Denies temperature intolerance. No recent weight change. · Hematologic/Lymphatic: Denies abnormal bruising or bleeding. No swollen lymph nodes    PHYSICAL EXAM:   BP (!) 195/94   Pulse (!) 43   Temp 96.6 °F (35.9 °C) (Oral)   Resp 16   Ht 5' 8\" (1.727 m)   Wt 242 lb 3.2 oz (109.9 kg)   SpO2 95%   BMI 36.83 kg/m²   CONST:  Well developed, well nourished  male who appears of stated age. Awake, alert and cooperative. No apparent distress. HEENT:   Head- Normocephalic, atraumatic   Eyes- Conjunctivae pink, anicteric  Throat- Oral mucosa pink and moist  Neck-  No stridor, trachea midline, no jugular venous distention. No carotid bruit. CHEST: Chest symmetrical and non-tender to palpation. No accessory muscle use or intercostal retractions  RESPIRATORY: Lung sounds -diminished  CARDIOVASCULAR:     Heart Inspection- shows no noted pulsations  Heart Palpation- no heaves or thrills; PMI is non-displaced   Heart Ausculation- Regular rate and rhythm, no murmur. No s3, s4 or rub   PV: 2+ lower extremity edema.  No varicosities. Pedal pulses palpable, no clubbing or cyanosis   ABDOMEN: Soft, non-tender to light palpation. Bowel sounds present. No palpable masses no organomegaly; no abdominal bruit  MS: Good muscle strength and tone. No atrophy or abnormal movements. : Deferred  SKIN: Warm and dry no statis dermatitis or ulcers   NEURO / PSYCH: Oriented to person, place. Speech clear and appropriate. Follows all commands. Pleasant affect     DATA:    ECG / Tele strips: please see HPI   Diagnostic:    CXR (8/30/2021)  Impression:  Small bilateral pleural effusion. Mild to moderate pulmonary edema. Stable cardiomegaly.       No intake or output data in the 24 hours ending 08/31/21 1010    Labs:   CBC:   Recent Labs     08/30/21  1724   WBC 6.7   HGB 13.9   HCT 43.0        BMP:   Recent Labs     08/30/21  1724      K 3.9   CO2 23   BUN 13   CREATININE 1.0   LABGLOM >60   CALCIUM 8.1*     Mag:   Recent Labs     08/30/21  1724   MG 1.9     HgA1c:   Lab Results   Component Value Date    LABA1C 8.6 (H) 04/19/2020     proBNP:   Recent Labs     08/30/21  1724   PROBNP 534*     PT/INR:   Recent Labs     08/30/21  1724   PROTIME 27.0*   INR 2.5     CARDIAC ENZYMES:  Recent Labs     08/30/21  1724 08/30/21  2055   TROPHS 41* 41*     LIVER PROFILE:  Recent Labs     08/30/21  1724   AST 27   ALT 9   LABALBU 3.2*         ASSESSMENT:  1. Acute on chronic HFpEF / component of lower extremity edema due to RV dysfunction/TR  2. ACC stage C / NYHA class III  3. Hypervolemic   4. Chronic atrial fibrillation - OAC with Eliquis   5. Hx intermittent second-degree AV block Mobitz type I, first-degree AV block with blocked sinus beats. No previous indication for pacemaker - avoiding AV berry blocking agents  6. Hx of bilateral PE (4/2020) with mild RV strain with moderately dilated RV and RV function is well preserved. 7. Moderate pulmonary hypertension secondary submassive PE  8. HTN  9. T2DM  10. HLD  11.  Severe dementia  12. DNR-CC       PLAN:  1. IV diuresis  2. Monitor daily weights, I&O, BMP and BNP  3. Amlodipine discontinued given lower extremity edema  4. Continue lisinopril and hydralazine for BP control  5. Avoid AV berry blocking agents  6. Continue to monitor telemetry, if becomes symptomatic and/or develops pauses consult electrophysiology      Above discussed with Dr. Blas Shells     Electronically signed by ADALBERTO Shankar - CNP on 8/31/2021 at 10:10 AM     Patient chart reviewed with ADALBERTO Shankar. Patient seen and examined independently. Assessment and plan were made in collaboration with ADALBERTO Shankar. 59-year-old male with history of dementia, chronic atrial fibrillation, intermittent second-degree block on no AV berry blocking agent, status post bilateral pulmonary embolism with RV strain in April 2020, hypertension, diabetes, hyperlipidemia and he is DNR CCA. Patient is followed up by Dr. Zara Ledbetter. He is seen in consultation due to CHF. Patient is a poor historian. Was admitted due to increasing shortness of breath, weight gain and lower extremity edema. His blood pressure is up to 195/90 4 bpm.    His physical exam is pertinent for:  Elderly obese male in no acute distress. No carotid bruit and no jugular venous distention. Irregular heart with distant heart sounds with 2/6 systolic murmur best heard at the left sternal border. Fair air entry with no crackles or wheezing. No abdominal bruit. 2+ bilateral leg edema. EKG done on admission revealed atrial fibrillation with slow ventricular response at 43 bpm, left anterior fascicular block, low voltage in early transition. proBNP 534. INR 2.5. High sensitivity troponin 41 and 41. Albumin 3.2. Chest x-ray small bilateral pleural effusion with pulmonary edema.     Telemetry reveals atrial fibrillation with slow ventricular response up to 34 bpm.    TTE (4/23/2020, Dr. Zara Ledbetter)   Summary:   Normal left ventricle size and systolic function.   Ejection fraction is visually estimated at 60-65%. Frequent ectopy will   affect the evaluation of LV systolic function.   Indeterminate diastolic function.   No regional wall motion abnormalities seen.   Mild concentric left ventricular hypertrophy.   Indeterminate diastolic function.   Moderately dilated right ventricle. Right ventricle global systolic   function is normal . TAPSE 26 mm.   Moderately enlarged right atrium size.   No systolic mitral regurgitation noted.   No hemodynamically significant aortic stenosis is present.   Mild tricuspid regurgitation.  RVSP is 60 mmHg. Pulmonary hypertension is moderate .   No evidence for hemodynamically significant pericardial effusion.   No previous echo for comparison. ASSESSMENT:  1. Acute on chronic HFpEF /ACC stage C / NYHA class III  2.  lower extremity edema due to RV dysfunction/moderate pulmonary hypertension and mild TR  and or amlodipine use. 3. Chronic atrial fibrillation/sick sinus syndrome- OAC with Eliquis   4. Hx intermittent second-degree AV block Mobitz type I, first-degree AV block with blocked sinus beats. Asymptomatic. No previous indication for pacemaker - avoiding AV berry blocking agents  5. Hx of bilateral PE (4/2020) with mild RV strain with moderately dilated RV and RV function is well preserved. 6. Moderate pulmonary hypertension secondary submassive PE  7. HTN  8. T2DM  9. HLD  10. Severe dementia  11. DNR-CC         PLAN:  1. IV diuresis  2. Monitor daily weights, I&O, BMP and BNP  3. Amlodipine discontinued given lower extremity edema  4. Continue lisinopril and hydralazine for BP control. May titrate hydralazine if needed for blood pressure control. 5. Avoid AV berry blocking agents  6. Continue to monitor telemetry, if becomes symptomatic and/or develops pauses consult electrophysiology    Thank you for allowing me to share in the care of patient.   Kyra Verde MD

## 2021-08-31 NOTE — PATIENT CARE CONFERENCE
P Quality Flow/Interdisciplinary Rounds Progress Note        Quality Flow Rounds held on August 31, 2021    Disciplines Attending:  Bedside Nurse, ,  and Nursing Unit Leadership    Mikaela Groves was admitted on 8/30/2021  4:41 PM    Anticipated Discharge Date:       Disposition:    Cm Score:  Cm Scale Score: 17    Readmission Risk              Risk of Unplanned Readmission:  13           Discussed patient goal for the day, patient clinical progression, and barriers to discharge.   The following Goal(s) of the Day/Commitment(s) have been identified:  Cardiology consult       Kin Byrd RN  August 31, 2021

## 2021-08-31 NOTE — PROGRESS NOTES
Spoke to [place] and he stated that him and his sister Meera Ambrocio are Cristopher Gerard to their father. Confirmed that he is a [facility] at facility where he resides. Dr. Sandra Calvin paged regarding code status order.

## 2021-08-31 NOTE — PROGRESS NOTES
[address] [place] [address] [address]      Date:2021                                                  Patient Name: Diane Carl  MRN: [id number]  : 1934  Room: [address] [place] [address]    Evaluating OT: Macario Pack, MOT, OTR/L  # [id number]    Referring Provider:  Isamar Huff DO  Specific Provider Orders:  Mazin Mason and Treat\"  21    Diagnosis: Acute congestive heart failure, unspecified heart failure type ([facility] [address] [I50.9]    Pt was admitted w/ SOB, Weight Gain, LE edema    Pertinent Medical History:  Pt has a past medical history of Hyperlipidemia and Pericarditis. ,  has a past surgical history that includes Vasectomy and joint replacement.     Surgeries this admission: None     Precautions:  Fall Risk  Cognition - Bed Alarm    Assessment of current deficits   [x] Functional mobility   [x]ADLs  [x] Strength               [x]Cognition   [x] Functional transfers   [x] IADLs         [x] Safety Awareness   [x]Endurance   [] Fine Coordination              [x] Balance      [] Vision/perception   []Sensation    []Gross Motor Coordination  [] ROM  [] Delirium                   [] Motor Control       OT PLAN OF CARE   OT POC based on physician orders, patient diagnosis and results of clinical assessment    Frequency/Duration 1-3 days/wk for 2 weeks PRN   Specific OT Treatment to include:   * Instruction/training on adapted ADL techniques and AE recommendations to increase functional independence within precautions       * Training on energy conservation strategies, correct breathing pattern and techniques to improve independence/tolerance for self-care routine  * Functional transfer/mobility training/DME recommendations for increased independence, safety, and fall prevention  * Patient/Family education to increase follow through with safety techniques and functional independence  * Recommendation of environmental modifications for increased safety with functional transfers/mobility and ADLs  * Cognitive retraining/development of therapeutic activities to improve problem solving, judgement, memory, and attention for increased safety/participation in ADL/IADL tasks  * Therapeutic exercise to improve motor endurance, ROM, and functional strength for ADLs/functional transfers  * Therapeutic activities to facilitate/challenge dynamic balance, stand tolerance for increased safety and independence with ADLs  * Therapeutic activities to facilitate gross/fine motor skills for increased independence with ADLs  * Neuro-muscular re-education: facilitation of righting/equilibrium reactions, midline orientation, scapular stability/mobility, normalization of muscle tone, and facilitation of volitional active controled movement  * Positioning to improve skin integrity, interaction with environment and functional independence  * Delirium prevention/treatment  * Manual techniques for edema management  Other:    Recommended Adaptive Equipment: TBD as pt progresses - reacher    Pt was a Fair historian - information obtained from chart    Home Living:  Pt lives in an [facility]  Bathroom setup:  Walk-in-Shower, [employer], Grab bars throughout  Equipment owned:  [employer]    Available Family Assist:  Pt reported staff provides assist for meal and med mgmt only    Prior Level of Function:  Pt reported being IND with all ADLs, Transfers and Mobility using [employer] vs No AD for ambulation.    Driving:  No  Occupation:  None reported    Pain Level:  No indication of pain during session    Additional Complaints:  None indicated    Cognition: A & O x 4 - pt was able to state his full name, , current Day, Date, month, year, place and situation   Able to Follow Multi-Step Commands w/ Occasional Min VCs   Memory:  good (-)   Sequencing:  good (-)   Problem solving:  good (-)   Judgement/safety:  good (-)  Additional Comments:  Pt was pleasant and cooperative. Vitals/Lab Values:  WFL Room Air     Functional Assessment:  AM-PAC Daily Activity Raw Score: 18/24     Initial Eval Status  Date: 8/31/21   Treatment Status  Date: STGs = LTGs  Time frame: 10-14 days   Feeding NT      Set up   Grooming Close SUP, Min VCs, Set up    Able to wash hands/face while standing at the sink Close SUP for safety w/ standing, Min VCs to problem solve task    Remote SUP  Standing at the [address] [place] [address] Ln A/Set up    Min A to don robe after set up while seated EOB, VCs to problem solve task    Unable to tolerate item retrieval for activities    Set up     LB Dressing Min A/Set up    Able to don/doff socks w/ use of adaptive tech while seated EOB - SUP for safety w/ sitting balance, Min A for simulated donning of pants over hips, Min A for standing balance w/ use of [employer], VCs for safety    SUP/Set up     Bathing NT    Pt ed re: Benefits of use of Shower chair/Grab bars for safety/fall prevention    SUP/Set up      Toileting Min A    Able to stand to urinate, Min A for clothing adjustment/Standing balance, VCs for safety    SUP     Bed Mobility  Supine to sit: Min A   Sit to supine:  Min A     Assist to lift LEs into bed, VCs for safety     Supine to sit: IND  Sit to supine: IND     Functional Transfers Min A    EOB 2-3x  Pt ed for safety/hand placement    SUP     Functional Mobility Min A w/ [employer] and w/ HHA    Mod VCs for safety w/ [employer] - short distances at b/s, in bathroom    Pt ed for safety/improved safety awareness, walker safety    SUP     Balance Sitting:     Static:  SUP EOB    Dynamic:  Close SUP w/ functional ax EOB     Standing:     Static:  Close SUP/CGA    Dynamic:  Min A w/ functional ax/mobility        Activity Tolerance Fair(+)    Able to tolerate ~ 15 mins of sitting ax at EOB  Able to tolerate ~ 5 mins + 10 mins  Standing ax w/ seated rest break b/t trials    Good(-)   Visual/  Perceptual    Hearing:  WNL   Glasses: Yes - Reading    WFL   Hearing Aids:  No Hand Dominance: Right   AROM Strength Additional Info:    RUE  WFL 4+/5 Good ;   Good(-) FMC/dexterity noted during ADL tasks     LUE WFL 4+/5 Good ;    Good(-) FMC/dexterity noted during ADL tasks       Sensation:  Denies numbness or tingling Tony UEs   Tone: WFL Tony UEs   Edema: None Noted Tony UEs, Moderate edema Tony LEs distally    Comments: Upon arrival, patient was found in supine. He was agreeable to participate in therapeutic ax. No Family present during session. Received permission from RN prior to engaging pt in OT services. At the end of the session, patient was properly positioned in Semi-Supine w/ LEs elevated for edema control. Call light and phone within reach, all lines and tubes intact. Oriented pt to call bell. Made all appropriate Environmental Modifications to facilitate pt's level of IND and safety. All needs met. Bed Alarm activated. Overall patient demonstrated decreased independence and safety during completion of ADL/functional transfer/mobility tasks. Pt would benefit from continued skilled OT to increase safety and independence with completion of ADL/IADL tasks for functional independence and quality of life.     Treatment: OT treatment provided this date includes:    Instruction/training on safety and adapted techniques for completion of ADLs, use of DME/AD/Adaptive equip:     Instruction/training on safe functional mobility/transfer techniques, use of DME/AD:     Instruction/training on energy conservation techs (EC)/Pursed-Lip Breathing (PLB)/work simplification for completion of ADLs:      Neuromuscular Reeducation to facilitate balance/righting reactions for increased function with ADLs:     Skilled positioning/alignment for Skin Integrity, Edema Control, to maximize Pt's ability to [facility] interact w/ his/her environment, maximize respiratory status   Activity tolerance - Sitting/Standing to improve endurance w/ functional ax    Cognitive retraining -  Cues for safety/safety awareness, sequencing, problem solving     Skilled monitoring of pt's response to tx ax        Consulted RN     Made all appropriate Environmental Modifications to facilitate pt's level of IND and safety.  Recommendations for Continued Participation in OT services during Hospitalization    Pt and/or Family verbalized/demonstrated a Good(-) understanding of education provided. Will Review PRN. Rehab Potential: Good for established goals     Patient / Family Goal: Not stated at this time      Patient and/or family were instructed on functional diagnosis, prognosis/goals and OT plan of care. Demonstrated Good(-) understanding. Eval Complexity: Low    Time In: 1606  Time Out: 1470  Total Treatment Time: 10 minutes    Min Units   OT Eval Low 97165  X  1   OT Eval Medium 42667      OT Eval High 44544      OT Re-Eval [id number]       Therapeutic Ex 37728       Therapeutic Activities 33545       ADL/Self Care 05022  10  1   Orthotic Management [id number]       Manual [id number]     Neuro Re-Ed [id number]       Non-Billable Time              Evaluation Time additionally includes thorough review of current medical information, gathering information on past medical history/social history and prior level of function, completion of standardized testing/informal observation of tasks, assessment of data and education on plan of care and goals.             Fredi Franks, MOT, OTR/L  # [id number]

## 2021-08-31 NOTE — CARE COORDINATION
Transition of care: Cardio consulted. IV bumex 1mg twice a day. DNR-CC. Met with pt in room. Pt is from [facility] at [facility]. Spoke with pt's daughter, Jose Coburn, UE#[phone] to confirm discharge plan to return the assistive living facility when medically ready. Per Josesuleman Coburn, pt has history of dementia. Talked to the nurse, [facility], at [facility] at [address] [place] [address]. They would like a nurse to nurse called at [place]. BZ#[phone] ext [phone]. Will need covid test and family can provide transportation. She stated pt ambulates with a FWW. Covid test was placed with pt's soft chart.  Sw/cm will follow

## 2021-08-31 NOTE — DISCHARGE INSTR - COC
Continuity of Care Form    Patient Name: Delano Wooten   :  1934  MRN:  [id number]    Admit date:  2021  Discharge date:  ***    Code Status Order: DNR-CC   Advance Directives:      Admitting Physician:  Kacie Ley DO  PCP: Anshul Spivey MD    Discharging Nurse: [facility] Unit/Room#: 0975/6346-G  Discharging Unit Phone Number: ***    Emergency Contact:   Extended Emergency Contact Information  Primary Emergency Contact: Josesito Crockett  Address: [address] [place] [address] [address] Phone: [phone]  Relation: Child  Secondary Emergency Contact: [address] [place] [address] Phone: [phone]  Relation: Child    Past Surgical History:  Past Surgical History:   Procedure Laterality Date    JOINT REPLACEMENT      VASECTOMY         Immunization History:   Immunization History   Administered Date(s) Administered    Influenza, Triv, inactivated, subunit, adjuvanted, IM (Fluad 65 yrs and older) 09/10/2018, 10/16/2019    Pneumococcal Conjugate 13-valent (Jude Cords) 2019    Zoster Recombinant (Shingrix) 2019, 2019       Active Problems:  Patient Active Problem List   Diagnosis Code    Multiple subsegmental pulmonary emboli without acute cor pulmonale (HCC) I26.94    Morbidly obese ([facility] E66.01    Acute congestive heart failure ([facility] I50.9       Isolation/Infection:   Isolation            No Isolation          Patient Infection Status       Infection Onset Added Last Indicated Last Indicated By Review Planned Expiration Resolved Resolved By    None active    Resolved    COVID-19 Rule Out 20 COVID-19 (Ordered)   20     COVID-19 Rule Out 20 COVID-19 (Ordered)   20 Rule-Out Test Resulted    Not detected 2020            Nurse Assessment:  Last Vital Signs: BP (!) 195/94   Pulse 55   Temp 96 °F (35.6 °C) (Temporal)   Resp 16   Ht 5' 8\" (1.727 m)   Wt 242 lb 3.2 oz (109.9 kg)   SpO2 96%   BMI 36.83 kg/m²     Last documented pain score (0-10 scale): Pain Level: 0  Last Weight:   Wt Readings from Last 1 Encounters:   21 242 lb 3.2 oz (109.9 kg)     Mental Status:  {IP PT MENTAL STATUS:22525}    IV Access:  { ANIBAL IV ACCESS:068479974}    Nursing Mobility/ADLs:  Walking   {CHP DME CJDS:501414480}  Transfer  {CHP DME GFRH:684284372}  Bathing  {CHP DME VOTY:618825250}  Dressing  {CHP DME QSHP:650895055}  Toileting  {CHP DME PGVA:266323705}  Feeding  {CHP DME UUYP:723900466}  Med Admin  {P DME JMCL:288635106}  Med Delivery   { ANIBAL MED Delivery:881871755}    Wound Care Documentation and Therapy:        Elimination:  Continence:   · Bowel: {YES / CS:77083}  · Bladder: {YES / DM:53518}  Urinary Catheter: {Urinary Catheter:330052566}   Colostomy/Ileostomy/Ileal Conduit: {YES / SD:97763}       Date of Last BM: ***    Intake/Output Summary (Last 24 hours) at 2021 1119  Last data filed at 2021 1035  Gross per 24 hour   Intake --   Output 500 ml   Net -500 ml     No intake/output data recorded.     Safety Concerns:     [employer] Safety Concerns:985521264}    Impairments/Disabilities:      [employer] Impairments/Disabilities:930506949}    Nutrition Therapy:  Current Nutrition Therapy:   [employer] Diet List:802921387}    Routes of Feeding: {P DME Other Feedings:388161853}  Liquids: {Slp liquid thickness:25631}  Daily Fluid Restriction: {CHP DME Yes amt example:045691735}  Last Modified Barium Swallow with Video (Video Swallowing Test): {Done Not Done PMZD:784218350}    Treatments at the Time of Hospital Discharge:   Respiratory Treatments: ***  Oxygen Therapy:  {Therapy; copd oxygen:35628}  Ventilator:    { CC Vent GSVH:872997438}    Rehab Therapies: {THERAPEUTIC INTERVENTION:8262594178}  Weight Bearing Status/Restrictions: [address] Weight Bearin}  Other Medical Equipment (for information only, NOT a DME order): {EQUIPMENT:641998310}  Other Treatments: ***    Patient's personal belongings (please select all that are sent with patient):  {CHP DME Belongings:314290017}    RN SIGNATURE:  {Esignature:445971353}    CASE MANAGEMENT/SOCIAL WORK SECTION    Inpatient Status Date: ***    Readmission Risk Assessment Score:  Readmission Risk              Risk of Unplanned Readmission:  13           Discharging to Facility/ Agency   · Name:   · Address:  · Phone:  · Fax:    Dialysis Facility (if applicable)   · Name:  · Address:  · Dialysis Schedule:  · Phone:  · Fax:    / signature: {Esignature:287370683}    PHYSICIAN SECTION    Prognosis: {Prognosis:3863928968}    Condition at Discharge: [facility] Patient Condition:832996208}    Rehab Potential (if transferring to Rehab): {Prognosis:9284178768}    Recommended Labs or Other Treatments After Discharge: ***    Physician Certification: I certify the above information and transfer of Kaylin Hutchinson  is necessary for the continuing treatment of the diagnosis listed and that he requires {Admit to Appropriate Level of Care:14231} for {GREATER/LESS:421980050} 30 days. Update Admission H&P: {CHP DME Changes in NPIZC:056184324}    PHYSICIAN SIGNATURE:  {Esignature:624472395} Electronically signed by Laura Narayan DO on 9/2/2021 at 8:20 AM        ***HEART FAILURE - CONGESTIVE HEART FAILURE***  DISCHARGE INSTRUCTIONS:  GUIDELINES TO FOLLOW AT [facility]/LTAC/SNF/ Assisted Living    No future appointments. Call day of discharge DR Kristin Delong at [facility] [phone] to set post hospital discharge for heart failure. Call Primary Care Provider DR Joe Ou  day of discharge to set post hospital discharge for within 1 week of hospital discharge.         MEDICATIONS:  · Please notify the doctor if patient is not able to take their medications or if medications are being held for any reasons (such as low blood pressure ect.)  · Do not give the patient ibuprofen (Advil or Motrin), naproxen (Aleve) without talking to the doctor first. This could make their heart failure worse. WEIGHT MONITORING:  ·  Weigh patient every day in the morning after they void (If patient is able to stand, please get a standing weight.)  ·  Notify the doctor of a weight gain of 3 pounds or more in 1 day   OR  a total of 5 pounds or more in 1 week             DIET   Cardiac heart healthy diet:  Low sodium diet: no  more than 2,000mg (2 grams) of salt / sodium per day (which equals to a little less than  a teaspoon of salt)/ Cardiac Diet: Low saturated / low trans fat, no added salt, caffeine restricted    · If patient is there for rehab and will be returning home in the near future; reinforce with the patient and the family to follow a low sodium diet (2,000 mg)- avoid using salt at the table, avoid / limit use of canned soups, processed / packaged foods, salted snacks, olives and pickles. Do not use a salt substitute without checking with the doctor. (Mrs. Randal Slaughter is safe to use).        NOTIFY THE DOCTOR THE FIRST DAY OF ONSET OF ANY OF THESE   SYMPTOMS:  ·  Weight gain of 3 pounds or more in 1 day         OR 5 pounds or more in one week  · More shortness of breath  · More swelling in stomach, legs, ankles or feet  · Feeling more tired, No energy  · Dry hacky cough  · Dizziness  · More chest pain / discomfort  · Hard time breathing laying down

## 2021-08-31 NOTE — ED NOTES
Pt found sitting in the little. States he was forgotten. Pt disoriented to year only. Pt placed back in bed and placed on monitor.       Yohan Jerome RN  08/31/21 3178

## 2021-08-31 NOTE — H&P
[address]                  [address] [facility],  [facility]                              HISTORY AND PHYSICAL    PATIENT NAME: El DOYLE               :        1934  MED REC NO:   [id number]                            ROOM:       6502  ACCOUNT NO:   [de-identified]                           ADMIT DATE: 2021  PROVIDER:     Brandi Flynn DO    CHIEF COMPLAINT:  Leg edema. HISTORY OF PRESENT ILLNESS:  The patient is an 71-year-old   male who was sent to the emergency room from the [facility] with increased  weight gain, leg swelling, shortness of breath. He was seen in the  emergency room, felt to have volume overload. He was admitted for  further evaluation and treatment. PRIMARY CARE PROVIDER: Deena Finch MD    SOCIAL HISTORY:  The patient quit tobacco.  No current alcohol use. PAST MEDICAL HISTORY:  Atrial fibrillation; chronic anticoagulation;  pulmonary hypertension; history of intermittent episodes of  second-degree AV block, Mobitz type 1; diabetes mellitus type 2;  hypertension; hyperlipidemia; dementia. PAST SURGICAL HISTORY:  Vasectomy and hip replacement. MEDICATIONS PRIOR TO ADMISSION:  Kenalog topical, Coumadin, potassium  chloride, Privigen, Aricept, Milk of Mag, artificial tears, Tylenol,  Norvasc, Amaryl, vitamin B12, Namenda, Lotensin, Lasix, TriCor. REVIEW OF SYSTEMS:  Remarkable for above-stated chief complaint plus  allergies none known. PHYSICAL EXAMINATION:  GENERAL APPEARANCE:  Physical exam reveals an 71-year-old  male  who is alert, cooperative and a poor historian. VITAL SIGNS:  On admission, temperature 96.9, pulse 45, respirations 16,  blood pressure 166/84. HEENT:  Head:  Normocephalic, atraumatic. Eyes:  Pupils equal and  reactive to light. Extraocular muscles intact. Fundi not well  visualized. Nose:  No obstruction, polyp or discharge noted.   Mouth  mucosa without lesion. Teeth edentulous. Pharynx noninjected without  exudate. NECK:  Supple. No JVD. No thyromegaly. No carotid bruits. HEART:  Irregularly irregular without murmur. LUNGS:  Clear to auscultation bilaterally. ABDOMEN:  Positive bowel sounds, soft, nontender. No rebound or  guarding. No hepatosplenomegaly. No masses. BACK:  With increased thoracic kyphosis. EXTREMITIES:  With bilateral lower extremity pitting edema. LYMPH NODES:  No adenopathy noted. SKIN:  With minimal erythema of the bilateral lower extremities. IMPRESSION:  Acute diastolic congestive heart failure, chronic  anticoagulation, atrial fibrillation, dementia, hypertension,  hyperlipidemia, diabetes mellitus type 2. PLAN:  Admit. Diurese. Discontinue amlodipine in light of leg edema. Serial lab. Daily weights. Intake and output. Cardiology to see. Discharge plan home when stable.         Katiana Pineda DO    D: 08/31/2021 14:00:07       T: 08/31/2021 14:03:28     [id number]  Job#: [id number]     Doc#: [id number]    CC:

## 2021-08-31 NOTE — PLAN OF CARE
Patient's chart updated to reflect:      . - HF care plan, HF education points and HF discharge instructions.  -Orders: 2 gram sodium diet, daily weights, I/O.  -PCP and/or Cardiologist appointment to be scheduled within 7 days of hospital discharge.   Kamar Brennan RN RN, BSN  Heart Failure Navigator

## 2021-09-01 NOTE — PLAN OF CARE
Problem: Skin Integrity:  Goal: Will show no infection signs and symptoms  Description: Will show no infection signs and symptoms  Outcome: Met This Shift  Goal: Absence of new skin breakdown  Description: Absence of new skin breakdown  Outcome: Met This Shift     Problem: Falls - Risk of:  Goal: Will remain free from falls  Description: Will remain free from falls  9/1/2021 0119 by Nu Allen RN  Outcome: Met This Shift  8/31/2021 1653 by Julio Davis RN  Outcome: Met This Shift  Goal: Absence of physical injury  Description: Absence of physical injury  9/1/2021 0119 by Nu Allen RN  Outcome: Met This Shift  8/31/2021 1653 by Julio Davis RN  Outcome: Met This Shift

## 2021-09-01 NOTE — PROGRESS NOTES
Inpatient Cardiology Progress note     PATIENT IS BEING FOLLOWED FOR: CHF     Yu Castellon is a 80 y.o. male who follows with Dr Kali Vazquez      PMHx of chronic atrial fibrillation on [address] with Coumadin, intermittent episodes of second-degree AV block Mobitz type I, first-degree AV block with blocked sinus beats (no indication for PPM, holding AV berry blockers), bilateral pulmonary embolism with mild RV strain (4/2020), HTN, T2DM, HLD and severe dementia. SUBJECTIVE:  \"feeling ok\" denies chest pain or shortness of breath. OBJECTIVE: No apparent distress     ROS:  Consist: Denies fevers, chills or night sweats  Heart: Denies chest pain, palpitations, lightheadedness, dizziness or syncope  Lungs: Denies SOB, cough, wheezing, orthopnea or PND  GI: Denies abdominal pain, vomiting or diarrhea    PHYSICAL EXAM:   BP (!) 178/69   Pulse 80   Temp 97 °F (36.1 °C) (Temporal)   Resp 18   Ht 5' 8\" (1.727 m)   Wt 233 lb 6.4 oz (105.9 kg)   SpO2 97%   BMI 35.49 kg/m²      CONST:  Well developed, obese  male who appears his stated age. Awake, alert, cooperative, no apparent distress  HEENT:   Head- Normocephalic, atraumatic   Eyes- Conjunctivae pink, anicteric  Throat- Oral mucosa pink and moist  Neck-  No stridor, trachea midline, no jugular venous distention. CHEST: Chest symmetrical and non-tender to palpation. RESPIRATORY: Lung sounds clear throughout fields bilaterally. No wheeze or rhonchi noted. CARDIOVASCULAR:     No carotid bruit noted bilaterally   Heart Ausculation- Irregular rhythm, controlled rate. No murmur. PV: No lower extremity edema. No varicosities. ABDOMEN: Soft, non-tender to light palpation. Bowel sounds present. MS: Good muscle strength and tone. No atrophy or abnormal movements. : Deferred  SKIN: Warm and dry no statis dermatitis or ulcers   NEURO / PSYCH: Oriented to person, place and time. Speech clear and appropriate. Follows all commands.  Pleasant affect Intake/Output Summary (Last 24 hours) at 9/1/2021 1313  Last data filed at 9/1/2021 0900  Gross per 24 hour   Intake 420 ml   Output 2050 ml   Net -1630 ml       Weight:   Wt Readings from Last 3 Encounters:   09/01/21 233 lb 6.4 oz (105.9 kg)   01/19/21 240 lb 3.2 oz (109 kg)   07/22/20 231 lb 6.4 oz (105 kg)     Current Inpatient Medications:   hydrALAZINE  50 mg Oral 3 times per day    memantine  10 mg Oral BID    potassium chloride  20 mEq Oral BID WC    donepezil  10 mg Oral Daily    warfarin  3 mg Oral Daily    bumetanide  1 mg IntraVENous BID    insulin lispro  0-12 Units SubCUTAneous TID WC    insulin lispro  0-6 Units SubCUTAneous Nightly    lisinopril  40 mg Oral Daily    glipiZIDE  5 mg Oral BID AC       IV Infusions (if any):   dextrose         DIAGNOSTIC/ LABORATORY DATA:  Labs:   CBC:   Recent Labs     08/30/21  1724   WBC 6.7   HGB 13.9   HCT 43.0        BMP:   Recent Labs     08/31/21  1351 09/01/21  0443    138   K 4.0 4.2   CO2 30* 25   BUN 12 12   CREATININE 1.1 0.9   LABGLOM >60 >60   CALCIUM 9.3 9.2     Mag:   Recent Labs     08/30/21  1724   MG 1.9     HgA1c:   Lab Results   Component Value Date    LABA1C 8.6 (H) 04/19/2020     PT/INR:   Recent Labs     08/31/21  1351 09/01/21  0443   PROTIME 24.3* 24.3*   INR 2.2 2.2     LIVER PROFILE:  Recent Labs     08/30/21  1724   AST 27   ALT 9   LABALBU 3.2*       CXR (8/30/2021)  Impression:  Small bilateral pleural effusion. Mild to moderate pulmonary edema. Stable cardiomegaly. Telemetry: Afib 70s, PVCs     TTE (4/23/2020, Dr. Shnaeka Newell)   Summary:   Normal left ventricle size and systolic function. Ejection fraction is visually estimated at 60-65%. Frequent ectopy will   affect the evaluation of LV systolic function. Indeterminate diastolic function. No regional wall motion abnormalities seen. Mild concentric left ventricular hypertrophy. Indeterminate diastolic function. Moderately dilated right ventricle.  Right ventricle global systolic   function is normal . TAPSE 26 mm. Moderately enlarged right atrium size. No systolic mitral regurgitation noted. No hemodynamically significant aortic stenosis is present. Mild tricuspid regurgitation. RVSP is 60 mmHg. Pulmonary hypertension is moderate . No evidence for hemodynamically significant pericardial effusion. No previous echo for comparison. Assessment:  Acute on chronic HFpEF. EF 60-65%. lower extremity edema, improving. Net - 2.1L since admission   Chronic atrial fibrillation - OAC with Coumadin. INR 2.2   Hx intermittent second-degree AV block Mobitz type I, first-degree AV block with blocked sinus beats. EP evaluated -- no indication for pacemaker - avoiding AV berry blocking agents (reason why not on BB for GDMT)   Hx of bilateral PE (4/2020) with mild RV strain with moderately dilated RV, RV systolic function normal.   Moderate pulmonary hypertension secondary submassive PE  Hypertension, poorly controlled   Type 2 diabetes mellitus, A1c 8.6 4/2020   Hyperlipidemia   Severe dementia  DNR-CC        Plan:  Continue IV diuresing and switch to po diuretic when euvolemic. Monitor daily weights, I&O, BMP and BNP. Increase Hydralazine 100mg TID - monitor BP. Avoid Norvasc due to MARLYN. Monitor telemetry, consider EP consult if he develops significant pauses or  becomes symptomatic   Avoid AV berry blocking agents   Continue Coumadin for anticoagulation; monitor INR   Will sign-off. May call if needed.      Discussed with Dr Lissette Mendoza   Electronically signed by Romaine Frost on 9/1/2021 at 1:13 PM

## 2021-09-01 NOTE — PROGRESS NOTES
Hospital Medicine    Subjective:  Pt seen this am alert conversive      Current Facility-Administered Medications:     hydrALAZINE (APRESOLINE) tablet 50 mg, 50 mg, Oral, 3 times per day, Kacie Ley, DO, 50 mg at 09/01/21 1348    magnesium hydroxide (MILK OF MAGNESIA) 400 MG/5ML suspension 30 mL, 30 mL, Oral, Daily PRN, Brennan [place] Mal[place], DO    memantine [facility]) tablet 10 mg, 10 mg, Oral, BID, Brennan [place], DO, 10 mg at 09/01/21 1566    potassium chloride (KLOR-CON M) extended release tablet 20 mEq, 20 mEq, Oral, BID WC, Brennan [place], DO, 20 mEq at 09/01/21 0840    donepezil (ARICEPT) tablet 10 mg, 10 mg, Oral, Daily, Brennan [place], DO, 10 mg at 09/01/21 1153    warfarin (COUMADIN) tablet 3 mg, 3 mg, Oral, Daily, Brennan [place], DO, 3 mg at 08/31/21 1640    bumetanide (BUMEX) injection 1 mg, 1 mg, IntraVENous, BID, Brennan [place], DO, 1 mg at 09/01/21 0840    insulin lispro (HUMALOG) injection vial 0-12 Units, 0-12 Units, SubCUTAneous, TID WC, Brennan [place], DO, 2 Units at 09/01/21 1153    insulin lispro (HUMALOG) injection vial 0-6 Units, 0-6 Units, SubCUTAneous, Nightly, Brennan [place], DO, 1 Units at 08/31/21 2120    lisinopril (PRINIVIL;ZESTRIL) tablet 40 mg, 40 mg, Oral, Daily, Brennan [place], DO, 40 mg at 09/01/21 0512    glipiZIDE (GLUCOTROL) tablet 5 mg, 5 mg, Oral, BID AC, Brennan [place], DO, 5 mg at 09/01/21 0608    glucose (GLUTOSE) 40 % oral gel 15 g, 15 g, Oral, PRN, Brennan [place], DO    dextrose 50 % IV solution, 12.5 g, IntraVENous, PRN, Brennan [place], DO    glucagon (rDNA) injection 1 mg, 1 mg, IntraMUSCular, PRN, Brennan Shantal Mora,     dextrose 5 % solution, 100 mL/hr, IntraVENous, PRN, Brennan Shantal Estrellamer, DO    Objective:    BP (!) 178/69   Pulse 80   Temp 97 °F (36.1 °C) (Temporal)   Resp 18   Ht 5' 8\" (1.727 m)   Wt 233 lb 6.4 oz (105.9 kg)   SpO2 97%   BMI 35.49 kg/m²     Heart:  irreg  Lungs:  ctab  Abd: + bs soft nontender  Extrem: Edema legs    CBC with Differential:    Lab Results   Component Value Date    WBC 6.7 08/30/2021    RBC 4.71 08/30/2021    HGB 13.9 08/30/2021    HCT 43.0 08/30/2021     08/30/2021    MCV 91.3 08/30/2021    MCH 29.5 08/30/2021    MCHC 32.3 08/30/2021    RDW 13.9 08/30/2021    LYMPHOPCT 33.6 08/30/2021    MONOPCT 11.7 08/30/2021    BASOPCT 1.5 08/30/2021    MONOSABS 0.78 08/30/2021    LYMPHSABS 2.24 08/30/2021    EOSABS 0.27 08/30/2021    BASOSABS 0.10 08/30/2021     CMP:    Lab Results   Component Value Date     09/01/2021    K 4.2 09/01/2021    K 3.9 04/24/2020    CL 99 09/01/2021    CO2 25 09/01/2021    BUN 12 09/01/2021    CREATININE 0.9 09/01/2021    GFRAA >60 09/01/2021    LABGLOM >60 09/01/2021    GLUCOSE 128 09/01/2021    PROT 6.5 08/30/2021    LABALBU 3.2 08/30/2021    CALCIUM 9.2 09/01/2021    BILITOT 0.4 08/30/2021    ALKPHOS 49 08/30/2021    AST 27 08/30/2021    ALT 9 08/30/2021     Warfarin PT/INR:    Lab Results   Component Value Date    INR 2.2 09/01/2021    INR 2.2 08/31/2021    INR 2.5 08/30/2021    PROTIME 24.3 (H) 09/01/2021    PROTIME 24.3 (H) 08/31/2021    PROTIME 27.0 (H) 08/30/2021       Assessment:    Principal Problem:    Acute diastolic CHF (congestive heart failure) (HCC)  Active Problems:    Obesity    Pulmonary HTN (HCC)    History of pulmonary embolism    Chronic anticoagulation    Dementia (HCC)    Type 2 diabetes mellitus, without long-term current use of insulin ([address]    Hyperlipidemia  Resolved Problems:    * No resolved hospital problems.  *      Plan:  Cont diuresis serial lab increase hydralazine dose / spoke with pts daughter re case        Maeve Rodriguez,   2:11 PM  9/1/2021

## 2021-09-01 NOTE — CARE COORDINATION
Iv bumex 1mg twice a day. Cardio following. Called and spoke with the nurse, Dharmesh Short,  at [facility]. Went over therapy evals. PT eval am-pac 16/24 and ambulated 120ft with no AD, SBA. OT eval am-pac 18/24. Dharmesh Short is ok with pt returning there at [place]. They can setup [facility] for pt/ot if needed.  Sw/cm will follow

## 2021-09-02 NOTE — CARE COORDINATION
Discharge order on chart. Spoke with pt's daughter, Kirstin Guerrero, OU#[phone] to confirm she will provide transportation back to [facility]. I told her to wait until she hears from his nurse as to when to pick pt up. Covid test is with pt's soft chart.

## 2021-09-02 NOTE — PROGRESS NOTES
Hospital Medicine    Subjective:  Pt alert conversive fabian diet      Current Facility-Administered Medications:     hydrALAZINE (APRESOLINE) tablet 100 mg, 100 mg, Oral, 3 times per day, Corinne Avila [place], 100 mg at 09/02/21 1404    magnesium hydroxide (MILK OF MAGNESIA) 400 MG/5ML suspension 30 mL, 30 mL, Oral, Daily PRN, Gauri Purchase Malmer, DO    memantine [facility]) tablet 10 mg, 10 mg, Oral, BID, Gauri Purchase Malmer, DO, 10 mg at 09/02/21 0809    potassium chloride (KLOR-CON M) extended release tablet 20 mEq, 20 mEq, Oral, BID WC, Gauri Purchase Malmer, DO, 20 mEq at 09/02/21 0809    donepezil (ARICEPT) tablet 10 mg, 10 mg, Oral, Daily, Gauri Purchase Malmer, DO, 10 mg at 09/02/21 0809    warfarin (COUMADIN) tablet 3 mg, 3 mg, Oral, Daily, Gauri Purchase Malmer, DO, 3 mg at 09/01/21 1629    bumetanide (BUMEX) injection 1 mg, 1 mg, IntraVENous, BID, Gauri Purchase Malmer, DO, 1 mg at 09/02/21 0810    insulin lispro (HUMALOG) injection vial 0-12 Units, 0-12 Units, SubCUTAneous, TID WC, Gauri Purchase Malmer, DO, 2 Units at 09/02/21 1204    insulin lispro (HUMALOG) injection vial 0-6 Units, 0-6 Units, SubCUTAneous, Nightly, Gauri Purchase Malmer, DO, 1 Units at 09/01/21 2134    lisinopril (PRINIVIL;ZESTRIL) tablet 40 mg, 40 mg, Oral, Daily, Gauri Purchase Malmer, DO, 40 mg at 09/02/21 0810    glipiZIDE (GLUCOTROL) tablet 5 mg, 5 mg, Oral, BID AC, Gauri Purchase Malmer, DO, 5 mg at 09/02/21 0600    glucose (GLUTOSE) 40 % oral gel 15 g, 15 g, Oral, PRN, Gauri Purchase Malmer, DO    dextrose 50 % IV solution, 12.5 g, IntraVENous, PRN, Gauri Purchase Malmer, DO    glucagon (rDNA) injection 1 mg, 1 mg, IntraMUSCular, PRN, Gauri Mora DO    dextrose 5 % solution, 100 mL/hr, IntraVENous, PRN, Gauri Mora DO    Objective:    BP (!) 144/81   Pulse 86   Temp 98 °F (36.7 °C) (Temporal)   Resp 18   Ht 5' 8\" (1.727 m)   Wt 232 lb 3.2 oz (105.3 kg)   SpO2 93%   BMI 35.31 kg/m²     Heart:  reg  Lungs:  ctab  Abd: + bs soft nontender  Extrem: Edema legs    CBC with Differential:    Lab Results   Component Value Date    WBC 6.7 08/30/2021    RBC 4.71 08/30/2021    HGB 13.9 08/30/2021    HCT 43.0 08/30/2021     08/30/2021    MCV 91.3 08/30/2021    MCH 29.5 08/30/2021    MCHC 32.3 08/30/2021    RDW 13.9 08/30/2021    LYMPHOPCT 33.6 08/30/2021    MONOPCT 11.7 08/30/2021    BASOPCT 1.5 08/30/2021    MONOSABS 0.78 08/30/2021    LYMPHSABS 2.24 08/30/2021    EOSABS 0.27 08/30/2021    BASOSABS 0.10 08/30/2021     CMP:    Lab Results   Component Value Date     09/02/2021    K 3.8 09/02/2021    K 3.9 04/24/2020     09/02/2021    CO2 24 09/02/2021    BUN 15 09/02/2021    CREATININE 1.0 09/02/2021    GFRAA >60 09/02/2021    LABGLOM >60 09/02/2021    GLUCOSE 163 09/02/2021    PROT 6.5 08/30/2021    LABALBU 3.2 08/30/2021    CALCIUM 8.7 09/02/2021    BILITOT 0.4 08/30/2021    ALKPHOS 49 08/30/2021    AST 27 08/30/2021    ALT 9 08/30/2021     Warfarin PT/INR:    Lab Results   Component Value Date    INR 2.1 09/02/2021    INR 2.2 09/01/2021    INR 2.2 08/31/2021    PROTIME 22.8 (H) 09/02/2021    PROTIME 24.3 (H) 09/01/2021    PROTIME 24.3 (H) 08/31/2021       Assessment:    Principal Problem:    Acute diastolic CHF (congestive heart failure) (HCC)  Active Problems:    Obesity    Pulmonary HTN (HCC)    History of pulmonary embolism    Chronic anticoagulation    Dementia (HCC)    Type 2 diabetes mellitus, without long-term current use of insulin ([address]    Hyperlipidemia  Resolved Problems:    * No resolved hospital problems.  *      Plan:  Dc to snf change to po diuretic        Sobia Thomas DO  2:39 PM  9/2/2021

## 2021-09-03 NOTE — CARE COORDINATION
[address] Initial Transition Call    Call within 2 business days of discharge: Yes    Patient: Elizabeth Price Patient : 1934   MRN: <[id number]>  Reason for Admission: CHF  Discharge Date: 21 RARS: Readmission Risk Score: 13      Last Discharge [facility]       Complaint Diagnosis Description Type Department Provider    21 Congestive Heart Failure Acute congestive heart failure, unspecified heart failure type ([facility] . .. ED to Hosp-Admission (Discharged) (ADMITTED) ALBERTO 6SE Jimmy Baker 4, DO; Jerrica Ingram. .. Attempted to contact patient's daughter Rock Charlton for BPCI-A Initial Transition Call. Left HIPAA Compliant message on Voice mail to call CTN (number given) with any questions and an update on your condition since discharge. Called The Stephan at [facility] and spoke to Johnny Polanco. She confirms patient is there. Will continue to follow. Selam Jarvis LPN    [phone]  [address] Transitions 24 Hour Call    Care Transitions Interventions         Follow Up  No future appointments.     Selam Jarvis LPN

## 2021-09-07 NOTE — CARE COORDINATION
[facility] Transitions Initial Follow Up Call    Call within 2 business days of discharge: Yes    Patient: Marvel Gonzalez Patient : 1934   MRN: <[id number]>  Reason for Admission: -80 Acute Diastolic CHF  Discharge Date: 21 RARS: Readmission Risk Score: 13      Last Discharge [address]       Complaint Diagnosis Description Type Department Provider    21 Congestive Heart Failure Acute congestive heart failure, unspecified heart failure type ([facility] [address] . .. ED to Hosp-Admission (Discharged) (ADMITTED) [facility] [address] 4, DO; Shelton Solomon. .. Spoke with: Patient's daughter Josesito/BAILEY for initial [address] Transition call [facility] discharge. Explained the role of Care Transition Nurse and the BPCI-A program, patient is agreeable to follow up calls Post discharge from the [address] [place] [address] reports that Pt resides at [facility]. She states that the Pt has bilateral edema and is not sure if the facility is weighing him daily. Advised Josesito that Pt's Cardiologist should be notified if he has a weight gain 2 pounds in a day, or 3-5 pounds in 1 week. Notify Pt's doctor for increased shortness of breath, or swelling in legs or feet. Follow a low sodium diet. Pt's daughter v/u. She states that he has compression hose, but the facility is not putting them on the Pt. Branden Tovar states that she has a number of concerns about the facility that she is going to address with them at a scheduled meeting on Friday. Unable to complete Medication review at this time. CTN contact information given. Facility: [facility]    Non-face-to-face services provided:  Obtained and reviewed discharge summary and/or continuity of care documents    Transitions of Care Initial Call    Was this an external facility discharge?  No Discharge Facility:     Challenges to be reviewed by the provider   Additional needs identified to be addressed with provider: No  none       Method of communication with provider : none      Advance Care Planning:   Does patient have an Advance Directive: reviewed and current. Was this a readmission? No  Patient stated reason for admission: CHF exacerbation  Patients top risk factors for readmission: functional cognitive ability and medical condition-CHF, dementia    Care Transition Nurse (CTN) contacted the family by telephone to perform post hospital discharge assessment. Verified name and  with family as identifiers. Provided introduction to self, and explanation of the CTN role. CTN reviewed discharge instructions, medical action plan and red flags with family who verbalized understanding. Family given an opportunity to ask questions and does not have any further questions or concerns at this time. Were discharge instructions available to patient? Yes. Reviewed appropriate site of care based on symptoms and resources available to patient including: PCP, Specialist and When to call 911. The family agrees to contact the PCP office for questions related to their healthcare. Medication reconciliation was performed with family, who verbalizes understanding of administration of home medications. Advised obtaining a 90-day supply of all daily and as-needed medications. Covid Risk Education     Educated patient about risk for severe COVID-19 due to risk factors according to CDC guidelines. LPN CC reviewed discharge instructions, medical action plan and red flag symptoms with the family who verbalized understanding. Discussed COVID vaccination status: Yes. Education provided on COVID-19 vaccination as appropriate. Discussed exposure protocols and quarantine with CDC Guidelines. Family was given an opportunity to verbalize any questions and concerns and agrees to contact LPN CC or health care provider for questions related to their healthcare. Reviewed and educated family on any new and changed medications related to discharge diagnosis.      Was patient discharged with a pulse oximeter? No Discussed and confirmed pulse oximeter discharge instructions and when to notify provider or seek emergency care. LPN CC provided contact information. CTN [facility] on severity of symptoms and risk factors.   Plan for next call:         Care Transitions 24 Hour Call    Schedule Follow Up Appointment with PCP: Declined  Do you have any ongoing symptoms?: Yes  Patient-reported symptoms: Other (Comment: bilteral foot edema)  Do you have a copy of your discharge instructions?: Yes  Do you have all of your prescriptions and are they filled?: Yes  Have you been contacted by a [address]?: No  Have you scheduled your follow up appointment?: No  Were you discharged with any Home Care or Post Acute Services: No  Do you feel like you have everything you need to keep you well at home?: Yes  Care Transitions Interventions  No Identified Needs         Follow Up  Future Appointments   Date Time Provider Blair Scott   9/15/2021 10:30 AM Claribel Akbar 8225       Andrew Berry LPN

## 2021-09-13 NOTE — TELEPHONE ENCOUNTER
Received listing of patient's weights from Stephan at [facility]. Scanned to chart. Facility asking if Dr. Heidy Elise will monitor weights and diuretics or Dr. Collette Gondola. Please review and advise.

## 2021-09-15 NOTE — PROGRESS NOTES
[facility]  Office Visit         Reason for Visit: Heart Failure    Primary Cardiologist: Dr. Austin Vazquez         History of Present Illness:     Mr. Algie Meigs is a 80year old male who is known to [facility] and follows with Dr. Austin Vazquez. He has a PMHx of atrial fibrillation, intermittent episodes of second-degree AV block Mobitz type I, first-degree AV block with blocked sinus beats (no indication for PPM, holding AV berry blockers), bilateral pulmonary embolism with mild RV strain (4/2020), HTN, T2DM, HLD and severe dementia. He recently presented to the emergency room with complaints of increased shortness of breath and bilateral lower extremity edema. Per daughter the assisted living facility had not been giving him his Lasix for almost 26 days due to discrepancy with prescription resulting in acute heart failure hospitalization. Upon arrival he was hypertensive and hypervolemic. He was admitted, started on IV diuretics and antihypertensives were adjusted. He subjectively improved and return to assisted living    He presents today in post acute heart failure hospitalization follow-up, since discharge from the hospital he has been compliant with his current cardiac medications. He has good urinary response to oral diuretic. He has ongoing bilateral lower extremity edema, that initially worsened after discharge. His weight is also up approximately 5 pounds since discharge from the hospital.  Unsure if facility is monitoring salt in his diet. He denies dyspnea with exertion, shortness of breath, or decline in overall functional capacity. He denies orthopnea, PND, nocturnal cough or hemoptysis. He denies abdominal distention or bloating, early satiety, anorexia/change in appetite, unintentional weight loss. He does lower extremity edema. He denies exertional lightheadedness. He denies palpitations, syncope or near syncope.      Review of systems is negative for chest pain, pressure, discomfort. When ambulating on an incline, He does not leg claudication. History is negative for neurological symptoms including transient loss of vision, asymmetric weakness, aphasia, dysphasia, numbness, tingling. Patient Active Problem List    Diagnosis Date Noted    Acute diastolic CHF (congestive heart failure) ([facility] 08/31/2021    Pulmonary HT[facility] 08/31/2021    History of pulmonary embolism 08/31/2021    Chronic anticoagulation 08/31/2021    Dementia ([facility] 08/31/2021    Type 2 diabetes mellitus, without long-term current use of insulin ([facility] 08/31/2021    Hyperlipidemia 08/31/2021    Obesity 07/27/2020    Multiple subsegmental pulmonary emboli without acute cor pulmonale ([facility] 04/19/2020     PAST MEDICAL HISTORY:  1. Chronic atrial fibrillation. [address] with Eliquis  2. Mild RV dysfunction in the setting of bilateral submassive PE's (4/2020)  3. Intermittent episodes of second-degree AV block Mobitz type I, first-degree AV block with blocked sinus beats (no indication for PPM, holding AV berry blockers)  4. DM Type II  5. HTN  6. Hypertriglyceridemia   7. History of pericarditis (~ 20 years ago)  8. Dementia   9. History of Vasectomy   10. Former smoker   6. Hx of hip replacement surgery ([facility] 2018).        Past Medical History:   Diagnosis Date    Hyperlipidemia     Pericarditis        Past Surgical History:   Procedure Laterality Date    JOINT REPLACEMENT      VASECTOMY         No Known Allergies      Outpatient Medications Marked as Taking for the 9/15/21 encounter (Office Visit) with ADALBERTO Naik CNP   Medication Sig Dispense Refill    acetaminophen (TYLENOL) 325 MG tablet Take 2 tablets by mouth every 6 hours as needed for Pain      glimepiride (AMARYL) 2 MG tablet Take 1 tablet by mouth 2 times daily (Patient taking differently: Take 4 mg by mouth daily ) 60 tablet 0    hydrALAZINE (APRESOLINE) 100 MG tablet Take 1 tablet by mouth every 8 hours 90 tablet 0    bumetanide (BUMEX) 1 MG tablet Take 1 tablet by mouth daily 30 tablet 0    potassium chloride (KLOR-CON M) 20 MEQ extended release tablet Take 1 tablet by mouth daily 30 tablet 0    lisinopril (PRINIVIL;ZESTRIL) 40 MG tablet Take 1 tablet by mouth daily 30 tablet 0    triamcinolone (KENALOG) 0.1 % cream Apply topically 2 times daily Apply topically 2 times daily.  warfarin (COUMADIN) 3 MG tablet Take 3 mg by mouth daily       Apoaequorin (PREVAGEN EXTRA STRENGTH) 20 MG CAPS Take 1 capsule by mouth daily 90 capsule 3    donepezil (ARICEPT) 10 MG tablet Take 1 tablet by mouth daily 30 tablet 5    magnesium hydroxide (MILK OF MAGNESIA) 400 MG/5ML suspension Take 30 mLs by mouth daily as needed for Constipation      ARTIFICIAL TEAR OP Place 2 drops into the right eye every 4 hours as needed       vitamin B-12 (CYANOCOBALAMIN) 1000 MCG tablet Take 1,000 mcg by mouth daily       memantine (NAMENDA) 10 MG tablet Take 10 mg by mouth 2 times daily      fenofibrate (TRICOR) 145 MG tablet Take 145 mg by mouth daily           Review of Systems:   Cardiac: As per HPI  General: No fever, chills, rigors  Pulmonary: As per HPI  HEENT: No visual disturbances, difficult swallowing  GI: No nausea, vomiting, abdominal pain  : No dysuria or hematuria  Endocrine: No thyroid disease or diabetes  Musculoskeletal: DUCKWORTH x 4, no focal motor deficits  Skin: Intact, no rashes  Neuro/Psych: No headache or seizures          Weights: Wt Readings from Last 3 Encounters:   09/15/21 240 lb 3.2 oz (109 kg)   09/02/21 232 lb 3.2 oz (105.3 kg)   01/19/21 240 lb 3.2 oz (109 kg)             Physical Examination:     BP (!) 124/50   Pulse 67   Resp 18   Ht 5' 8\" (1.727 m)   Wt 240 lb 3.2 oz (109 kg)   SpO2 95%   BMI 36.52 kg/m²     CONSTITUTIONAL: Alert and oriented times 3, no acute distress and cooperative to examination with proper mood and affect. SKIN: Skin color, texture, turgor normal. No rashes or lesions.   LYMPH: no cervical nodes, no inguinal nodes  HEENT: Head is normocephalic, atraumatic. EOMI, PERRLA. NECK: Supple, symmetrical, trachea midline, no adenopathy, thyroid symmetric, not enlarged and no tenderness, skin normal.  CHEST/LUNGS: chest symmetric with normal A/P diameter, normal respiratory rate and rhythm, lungs clear to auscultation without wheezes, rales or rhonchi. No accessory muscle use. Scars None   CARDIOVASCULAR: Heart sounds are normal.  Irregular rate and rhythm without murmur, gallop or rub. Normal S1 and S2. . Carotid and femoral pulses 2+/4 and equal bilaterally. ABDOMEN: Obese/soft. No and Laparoscopic scar(s) present. Normal bowel sounds. No bruits. soft, nondistended, no masses or organomegaly. no evidence of hernia. Percussion: Normal without hepatosplenomegally. Tenderness: absent. RECTAL: deferred, not clinically indicated  NEUROLOGIC: There are no focalizing motor or sensory deficits. CN II-XII are grossly intact. Crystal Amina EXTREMITIES: no cyanosis, no clubbing. 2-3+ bilateral lower extremity edema. Warm and well perfused. All the following diagnostics were personally reviewed and interpreted by me. LAB DATA:     9/2/2021 12:00   Sodium 140   Potassium 3.8   Chloride 102   CO2 24   BUN 15   Creatinine 1.0   Anion Gap 14   GFR Non- >60   GFR African American >60   Glucose 163 (H)   Calcium 8.7   Prothrombin Time 22.8 (H)   INR 2.1       IMAGING:    CXR (8/30/2021)  FINDINGS:  Trachea is midline.  Cardiac silhouette is enlarged but stable in size. Bilateral lower lung opacities identified.  No pneumothorax.  Slight blunting of bilateral costophrenic angles. Impression  Small bilateral pleural effusion. Mild to moderate pulmonary edema. Stable cardiomegaly.       CARDIAC TESTING:    TTE (4/23/2020, Dr. Fabiana Pozo)   Summary:   Normal left ventricle size and systolic function.   Ejection fraction is visually estimated at 60-65%.  Frequent ectopy will   affect the evaluation of LV systolic function.   Indeterminate diastolic function.   No regional wall motion abnormalities seen.   Mild concentric left ventricular hypertrophy.   Indeterminate diastolic function.   Moderately dilated right ventricle. Right ventricle global systolic   function is normal . TAPSE 26 mm.   Moderately enlarged right atrium size.   No systolic mitral regurgitation noted.   No hemodynamically significant aortic stenosis is present.   Mild tricuspid regurgitation.  RVSP is 60 mmHg. Pulmonary hypertension is moderate .   No evidence for hemodynamically significant pericardial effusion.   No previous echo for comparison. EKG  Atrial fibrillation  PVCs      ASSESSMENT:  1. Acute on chronic HFpEF / RV dysfunction  2. ACC stage C / NYHA class III  3. Mild hypervolemia with dependent lower extremity edema and weight gain of approximate 5 lbs  4. Chronic atrial fibrillation - OAC with Coumadin   5. Hx intermittent second-degree AV block Mobitz type I, first-degree AV block with blocked sinus beats. No previous indication for pacemaker - avoiding AV berry blocking agents  6. Hx of bilateral PE (4/2020) with mild RV strain with moderately dilated RV and RV function is well preserved. 7. Moderate pulmonary hypertension secondary submassive PE  8. HTN  9. T2DM  10. HLD  11. Severe dementia  12. DNR-CC       PLAN:  1. Give Bumex 1 mg twice daily for the next 3 days ( morning and afternoon)    2. Continue rest of current cardiac medications    3. Referral to CHF clinic at [facility] in 1 week     4. Follow up with Dr. Heidy Elise in 1 month    5.  Dry weight     -Stay Hydrated    -Diet should sodium restricted to 2 grams    -Again watch your daily weight trends and if you gain water weight please follow below instructions.    -If you gain 3-5 pounds in 2-3 days OR notice that you are retaining fluid in anyway just like you did before then take an extra dose of your water pill (Bumetanide/Bumex) every day until you lose the weight or feel better.     -If you notice that you have taken more than 2 extra doses in 1 week then please call and let us know. -If at any time you feel that you are retaining fluid, your medications are not working, or you feel ill in anyway, then please call us for either same day appointment or the next day, and for instructions. Our goal is to keep you out of the emergency room and the hospital and we have ways to do it. You just need to call us in a timely manner.     -If you become sick for other reasons, and notice that you are not urinating as much, the urine is very dark, you have significant diarrhea or vomiting, then please DO NOT take your water pill and CALL US immediately.     Jazmin Dickey APRN-CNP  [id number] [facility]

## 2021-09-15 NOTE — PATIENT INSTRUCTIONS
1. Give Bumex 1 mg twice daily for the next 3 days ( morning and afternoon)    2. Continue rest of current cardiac medications    3. Referral to CHF clinic at [facility] in 1 week     4. Follow up with Dr. Albania Vernon in 1 month    5. Dry weight     -Stay Hydrated    -Diet should sodium restricted to 2 grams    -Again watch your daily weight trends and if you gain water weight please follow below instructions.    -If you gain 3-5 pounds in 2-3 days OR notice that you are retaining fluid in anyway just like you did before then take an extra dose of your water pill (Bumetanide/Bumex) every day until you lose the weight or feel better.     -If you notice that you have taken more than 2 extra doses in 1 week then please call and let us know. -If at any time you feel that you are retaining fluid, your medications are not working, or you feel ill in anyway, then please call us for either same day appointment or the next day, and for instructions. Our goal is to keep you out of the emergency room and the hospital and we have ways to do it. You just need to call us in a timely manner.     -If you become sick for other reasons, and notice that you are not urinating as much, the urine is very dark, you have significant diarrhea or vomiting, then please DO NOT take your water pill and CALL US immediately.

## 2021-09-21 NOTE — DISCHARGE SUMMARY
[address]                  [address] [facility],  [facility]                               DISCHARGE SUMMARY    PATIENT NAME: Julio Lobo               :        1934  MED REC NO:   [id number]                            ROOM:       6502  ACCOUNT NO:   [de-identified]                           ADMIT DATE: 2021  PROVIDER:     Levy Vela DO                  DISCHARGE DATE:  2021    DISCHARGE DIAGNOSES:  1. Acute diastolic congestive heart failure. 2.  Atrial fibrillation, chronic anticoagulation. 3.  Dementia. 4.  Hypertension. 5.  Hyperlipidemia. 6.  Diabetes mellitus, type 2. HOSPITAL COURSE:  The patient is an 80-year-old  male who  presented to the emergency room with increasing leg swelling, shortness  of breath, weight gain. Diagnostic evaluation revealed findings  consistent with acute CHF, and the patient was admitted to the hospital.  He was diuresed. He was seen by Cardiology. His blood pressure  medications were adjusted. His status improved, and he was discharged  to skilled nursing facility on 2021 in stable condition. DISCHARGE MEDICATIONS:  As per discharge med rec which include,  1. Bumex 1 mg daily. 2.  Hydralazine 100 mg every eight hours. 3.  Lisinopril 40 mg daily. 4.  Tylenol p.r.n.  5.  Potassium chloride 20 mEq daily. 6.  Artificial tears p.r.n.  7.  Aricept 10 mg daily. 8.  TriCor 145 mg daily. 9.  Amaryl 2 mg b.i.d. 10.  Milk of Mag p.r.n.  11.  Namenda 10 mg b.i.d.  12.  Prevagen daily. 13.  Kenalog topical as directed. 14.  Vitamin B12 daily. 15.  Warfarin 3 mg daily or as directed. The patient instructed to follow up with Dr. Argentina Lesch, call office  for appointment. Follow up with Cardiology, call office for  appointment.         Ani Bill DO    D: 2021 13:40:38       T: 2021 13:43:28     [email or web address]  Job#: [id number]     Doc#: [id number]    CC:

## 2021-09-28 NOTE — PROGRESS NOTES
[address] [place] [address] [address] [place]   1934            Referring Provider: [facility]    Primary Care Physician: Dr. Ashley Negro   Cardiologist: Dr Kenyatta Zhang   Nephrologist:          History of Present Illness:     Swathi Navarro is a 80 y.o. male with a history of HFpEF, most recent EF 60-65% Echo 4/23/20. Patient Story:    He does not  have dyspnea with exertion, shortness of breath, or decline in overall functional capacity. He does not have orthopnea, PND, nocturnal cough or hemoptysis. He does not have abdominal distention or bloating, early satiety, anorexia/change in appetite. He does has a good urinary response to  oral diuretic. He does not have  lower extremity edema. He denies lightheadedness, dizziness. He denies palpitations, syncope or near syncope. He does not complain of chest pain, pressure, discomfort. No Known Allergies      Outpatient Medications Marked as Taking for the 9/28/21 encounter [facility] Encounter) with [facility] CHF ROOM 1   Medication Sig Dispense Refill    lisinopril (PRINIVIL;ZESTRIL) 40 MG tablet Take 40 mg by mouth daily      empagliflozin (JARDIANCE) 10 MG tablet Take 10 mg by mouth daily      acetaminophen (TYLENOL) 325 MG tablet Take 2 tablets by mouth every 6 hours as needed for Pain (Patient taking differently: Take 500 mg by mouth every 6 hours as needed for Pain 2 tabs q6h prn)      glimepiride (AMARYL) 2 MG tablet Take 1 tablet by mouth 2 times daily (Patient taking differently: Take 4 mg by mouth 2 times daily ) 60 tablet 0    hydrALAZINE (APRESOLINE) 100 MG tablet Take 1 tablet by mouth every 8 hours 90 tablet 0    bumetanide (BUMEX) 1 MG tablet Take 1 tablet by mouth daily 30 tablet 0    potassium chloride (KLOR-CON M) 20 MEQ extended release tablet Take 1 tablet by mouth daily 30 tablet 0    triamcinolone (KENALOG) 0.1 % cream Apply topically 2 times daily Apply topically 2 times daily.       warfarin (COUMADIN) 3 MG tablet Take 3 mg by mouth daily       Apoaequorin (PREVAGEN EXTRA STRENGTH) 20 MG CAPS Take 1 capsule by mouth daily 90 capsule 3    donepezil (ARICEPT) 10 MG tablet Take 1 tablet by mouth daily 30 tablet 5    magnesium hydroxide (MILK OF MAGNESIA) 400 MG/5ML suspension Take 30 mLs by mouth daily as needed for Constipation      ARTIFICIAL TEAR OP Place 2 drops into the right eye every 4 hours as needed       vitamin B-12 (CYANOCOBALAMIN) 1000 MCG tablet Take 1,000 mcg by mouth daily       memantine (NAMENDA) 10 MG tablet Take 10 mg by mouth 2 times daily      fenofibrate (TRICOR) 145 MG tablet Take 145 mg by mouth daily             Guideline directed medical:  ARNI/ACE I/ARB: Yes  Beta blocker:  No  Aldosterone antagonist:  No        Physical Examination:     BP (!) 95/54   Pulse 71   Resp 18   Wt 229 lb 3.2 oz (104 kg)   SpO2 93%   BMI 34.85 kg/m²          Neurological  Level of Consciousness: Alert (0)  Orientation Level: Oriented to person, Disoriented to place, Disoriented to time, Disoriented to situation         HEENT  HEENT (WDL): Exceptions to WDL  Right Eye: Impaired vision  Left Eye: Impaired vision, Other (Comment) (wears glasses )    Respiratory  Respiratory Pattern: Regular  L Breath Sounds: Clear  R Breath Sounds: Clear    Breath Sounds  Left Upper Lobe: Clear  Left Lower Lobe: Clear              Rhythm Interpretation  Additional Details: NSR  Pulse: 71         Gastrointestinal  Abdominal (WDL): Exceptions to WDL  Abdomen Inspection: Rounded  RUQ Bowel Sounds: Active  LUQ Bowel Sounds: Active  RLQ Bowel Sounds: Active  LLQ Bowel Sounds: Active          Bowel Sounds  RUQ Bowel Sounds: Active  LUQ Bowel Sounds: Active  RLQ Bowel Sounds: Active  LLQ Bowel Sounds:  Active    Peripheral Vascular  Peripheral Vascular (WDL): Within Defined Limits  RLE Edema: None  LLE Edema: None                                                 Pulse: 71                   LAB DATA:    BNP  No results for input(s): BNP in the last 72 hours. proBNP  Recent Labs     09/28/21  0830   PROBNP 675*       BMP  Recent Labs     09/28/21  0830      K 4.0      CO2 20*   BUN 33*   CREATININE 1.6*   GLUCOSE 207*   CALCIUM 8.6         WEIGHTS:  Wt Readings from Last 3 Encounters:   09/28/21 229 lb 3.2 oz (104 kg)   09/15/21 240 lb 3.2 oz (109 kg)   09/02/21 232 lb 3.2 oz (105.3 kg)         TELEMETRY:  Cardiac Regularity: regular NSR  Cardiac Rhythm/Interpretation: NSR        ASSESSMENT:  Jose Ordonez is evolemic with Weight loss after taking extra bumex at facility based on weights documented on record     Interventions completed this visit:  IV diuretics given no  Lab work obtained yes, bmp bnp   Reviewed continue current medications that patient as prescribed answered any questions   Educated on signs and symptoms of HF  Educated on low sodium diet  Detailed heart failure information provided to the patient, and the facility too review   PLAN:  Scheduled to follow up in CHF clinic on  Oct 5th, 12th and 19th   Given clinic phone number and aware of signs and symptoms to call with any HF change in symptoms.   This information was also given to the facility

## 2021-09-28 NOTE — PLAN OF CARE
Problem:  Activity:  Goal: Capacity to carry out activities will improve  Description: Capacity to carry out activities will improve  Outcome: Ongoing   Initiate plan of care

## 2021-09-30 NOTE — TELEPHONE ENCOUNTER
----- Message from ADALBERTO Kraft CNP sent at 9/30/2021 12:09 PM EDT -----  Labs and CHF infusion clinic note reviewed  Patient has decreased weight however slight rise in proBNP  CO2 24 > 20  BUN 15 > 33  SCR 1.0 > 1.6    Please ensure that he is staying well-hydrated  Returning the CHF clinic in 1 week for close follow-up

## 2021-09-30 NOTE — RESULT ENCOUNTER NOTE
Labs and CHF infusion clinic note reviewed  Patient has decreased weight however slight rise in proBNP  CO2 24 > 20  BUN 15 > 33  SCR 1.0 > 1.6    Please ensure that he is staying well-hydrated  Returning the CHF clinic in 1 week for close follow-up

## 2021-10-12 NOTE — PROGRESS NOTES
[address] [place] [address] “[address]” [place]   1934            Referring Provider: [facility]    Primary Care Physician: Dr. Bar Lemus   Cardiologist: Dr ANITHA Shi   Nephrologist:          History of Present Illness:     Herbie Dee is a 80 y.o. male with a history of HFpEF, most recent EF 60-65% Echo 4/23/20. Patient Story:    He does not  have dyspnea with exertion, shortness of breath, or decline in overall functional capacity. He does not have orthopnea, PND, nocturnal cough or hemoptysis. He does not have abdominal distention or bloating, early satiety, anorexia/change in appetite. He does has a good urinary response to  oral diuretic. He does not have  lower extremity edema. He denies lightheadedness, dizziness. He denies palpitations, syncope or near syncope. He does not complain of chest pain, pressure, discomfort. No Known Allergies      Outpatient Medications Marked as Taking for the 10/12/21 encounter [facility] Encounter) with [facility] CHF ROOM 1   Medication Sig Dispense Refill    ciclopirox (PENLAC) 8 % solution Apply topically nightly Apply topically nightly. Affected nails      mineral oil-hydrophilic petrolatum (AQUAPHOR) ointment Apply topically as needed for Dry Skin Apply topically as needed.       lisinopril (PRINIVIL;ZESTRIL) 40 MG tablet Take 40 mg by mouth daily      empagliflozin (JARDIANCE) 10 MG tablet Take 10 mg by mouth daily      acetaminophen (TYLENOL) 325 MG tablet Take 2 tablets by mouth every 6 hours as needed for Pain (Patient taking differently: Take 500 mg by mouth every 6 hours as needed for Pain 2 tabs q6h prn)      glimepiride (AMARYL) 2 MG tablet Take 1 tablet by mouth 2 times daily (Patient taking differently: Take 4 mg by mouth 2 times daily ) 60 tablet 0    hydrALAZINE (APRESOLINE) 100 MG tablet Take 1 tablet by mouth every 8 hours 90 tablet 0    bumetanide (BUMEX) 1 MG tablet Take 1 tablet by mouth daily 30 tablet 0    potassium chloride (KLOR-CON M) 20 MEQ extended release tablet Take 1 tablet by mouth daily 30 tablet 0    triamcinolone (KENALOG) 0.1 % cream Apply topically 2 times daily Apply topically 2 times daily.  warfarin (COUMADIN) 3 MG tablet Take 3 mg by mouth daily       Apoaequorin (PREVAGEN EXTRA STRENGTH) 20 MG CAPS Take 1 capsule by mouth daily 90 capsule 3    donepezil (ARICEPT) 10 MG tablet Take 1 tablet by mouth daily 30 tablet 5    magnesium hydroxide (MILK OF MAGNESIA) 400 MG/5ML suspension Take 30 mLs by mouth daily as needed for Constipation      ARTIFICIAL TEAR OP Place 2 drops into the right eye every 4 hours as needed       vitamin B-12 (CYANOCOBALAMIN) 1000 MCG tablet Take 1,000 mcg by mouth daily       memantine (NAMENDA) 10 MG tablet Take 10 mg by mouth 2 times daily      fenofibrate (TRICOR) 145 MG tablet Take 145 mg by mouth daily             Guideline directed medical:  ARNI/ACE I/ARB: Yes  Beta blocker:  No  Aldosterone antagonist:  No        Physical Examination:     /70   Pulse 57   Resp 16   Wt 224 lb 6.4 oz (101.8 kg)   SpO2 92%   BMI 34.12 kg/m²          Neurological  Level of Consciousness: Alert (0)  Orientation Level: Oriented to person, Disoriented to place, Disoriented to time, Disoriented to situation         HEENT  HEENT (WDL): Exceptions to WDL  Right Eye: Impaired vision  Left Eye: Impaired vision, Other (Comment) (wears glasses )    Respiratory  Respiratory Pattern: Regular    Breath Sounds  Right Upper Lobe: Clear  Right Middle Lobe: Clear  Right Lower Lobe: Fine Crackles  Left Upper Lobe: Clear  Left Lower Lobe: Fine Crackles         Cardiac  Cardiac Rhythm: 1° AV Block    Rhythm Interpretation  Pulse: 57         Gastrointestinal  Abdominal (WDL): Exceptions to WDL  Abdomen Inspection: Rounded  RUQ Bowel Sounds: Active  LUQ Bowel Sounds: Active  RLQ Bowel Sounds: Active  LLQ Bowel Sounds: Active          Bowel Sounds  RUQ Bowel Sounds:  Active  LUQ Bowel Sounds: Active  RLQ Bowel Sounds: Active  LLQ Bowel Sounds: Active    Peripheral Vascular  Peripheral Vascular (WDL): Within Defined Limits  RLE Edema: Trace  LLE Edema: Trace                                                 Pulse: 57                   LAB DATA:    BNP  No results for input(s): BNP in the last 72 hours. proBNP  Recent Labs     10/12/21  0935   PROBNP 1,045*       BMP  Recent Labs     10/12/21  0935      K 4.3      CO2 23   BUN 16   CREATININE 1.6*   GLUCOSE 171*   CALCIUM 8.9         WEIGHTS:  Wt Readings from Last 3 Encounters:   10/12/21 224 lb 6.4 oz (101.8 kg)   09/28/21 229 lb 3.2 oz (104 kg)   09/15/21 240 lb 3.2 oz (109 kg)         TELEMETRY:  Cardiac Regularity: regular NSR  Cardiac Rhythm/Interpretation: NSR        ASSESSMENT:  Alberteen March is evolemic   Heart rhythm showing signs of AV block, EKG ordered to verify rhythm   Verified Ekg NSR 1degree AV block -faxed to Dr Aguirre's office     Interventions completed this visit:  IV diuretics given no  Lab work obtained yes, bmp bnp   Reviewed continue current medications that patient as prescribed answered any questions   Educated on signs and symptoms of HF  Educated on low sodium diet  Detailed heart failure information provided to the patient, and the facility too review   PLAN:  Scheduled to follow up in CHF clinic on  Oct  19th   Given clinic phone number and aware of signs and symptoms to call with any HF change in symptoms.   This information was also given to the facility

## 2021-10-12 NOTE — PLAN OF CARE
Problem:  Activity:  Goal: Capacity to carry out activities will improve  Description: Capacity to carry out activities will improve  Outcome: Ongoing   Continue with plan of care

## 2021-10-12 NOTE — TELEPHONE ENCOUNTER
His EKG showed severe sinus bradycardia with heart rate in the upper 40s with first-degree AV block. I would recommend 14-day Holter monitor to assess any significant pauses and also to see if  he gets symptomatic with low heart rate.

## 2021-10-14 NOTE — PROGRESS NOTES
I enrolled 14 Day Preventice Monitor for patient. He is in a facility and his daughter will  monitor. The nurse at the facility will put the monitor on patient per Dawn. I called daughter and left voicemail it was ready to be picked up.       Patient was seen in office today for a 14 Day Preventice holter monitor per    Patient tolerated well and understood instruction and device # [id number]

## 2021-10-14 NOTE — TELEPHONE ENCOUNTER
Per Zafar Ray at [employer]. Okay for daughter to bring monitor to facility. Patient's daughter Lisa Miners) notified. Order placed. Will have MA prepare/enroll monitor and call daughter for .

## 2021-10-14 NOTE — TELEPHONE ENCOUNTER
Patient's daughter Rajiv Baugh) notified of Dr. Aguirre's recommendation. Per daughter, patient is at [facility] and they would not be able to bring him until Monday. Daughter can  monitor and take to facility for nurse to put on if they are willing to do that. Left message for Kacie Cornejo at [facility] to call me.

## 2021-10-19 NOTE — PROGRESS NOTES
[address] [place] [address] [address]   1934            Referring Provider: [facility]    Primary Care Physician: Dr. Arelis Arizmendi   Cardiologist: Dr Brendan Marcano   Nephrologist:          History of Present Illness:     Joanne Cerna is a 80 y.o. male with a history of HFpEF, most recent EF 60-65% Echo 4/23/20. Patient Story:    He does not  have dyspnea with exertion, shortness of breath, or decline in overall functional capacity. He does not have orthopnea, PND, nocturnal cough or hemoptysis. He does not have abdominal distention or bloating, early satiety, anorexia/change in appetite. He does has a good urinary response to  oral diuretic. He does not have  lower extremity edema. He denies lightheadedness, dizziness. He denies palpitations, syncope or near syncope. He does not complain of chest pain, pressure, discomfort. No Known Allergies      Outpatient Medications Marked as Taking for the 10/19/21 encounter [facility] Encounter) with [facility] CHF ROOM 1   Medication Sig Dispense Refill    warfarin (COUMADIN) 2 MG tablet Take 2 mg by mouth daily      glimepiride (AMARYL) 2 MG tablet Take 4 mg by mouth 2 times daily      ciclopirox (PENLAC) 8 % solution Apply topically nightly Apply topically nightly. Affected nails      mineral oil-hydrophilic petrolatum (AQUAPHOR) ointment Apply topically as needed for Dry Skin Apply topically as needed.       lisinopril (PRINIVIL;ZESTRIL) 40 MG tablet Take 40 mg by mouth daily      empagliflozin (JARDIANCE) 10 MG tablet Take 10 mg by mouth daily      acetaminophen (TYLENOL) 325 MG tablet Take 2 tablets by mouth every 6 hours as needed for Pain (Patient taking differently: Take 500 mg by mouth every 6 hours as needed for Pain 2 tabs q6h prn)      hydrALAZINE (APRESOLINE) 100 MG tablet Take 1 tablet by mouth every 8 hours 90 tablet 0    bumetanide (BUMEX) 1 MG tablet Take 1 tablet by mouth daily 30 tablet 0    potassium Bowel Sounds: Active    Peripheral Vascular  Peripheral Vascular (WDL): Within Defined Limits  RLE Edema: Trace  LLE Edema: Trace                                                 Pulse: 66                   LAB DATA:    BNP  No results for input(s): BNP in the last 72 hours. proBNP  No results for input(s): PROBNP in the last 72 hours. BMP  No results for input(s): NA, K, CL, CO2, BUN, CREATININE, GLUCOSE, CALCIUM in the last 72 hours. WEIGHTS:  Wt Readings from Last 3 Encounters:   10/19/21 220 lb 9.6 oz (100.1 kg)   10/12/21 224 lb 6.4 oz (101.8 kg)   09/28/21 229 lb 3.2 oz (104 kg)         TELEMETRY:  Cardiac Regularity: regular NSR  Cardiac Rhythm/Interpretation: NSR        ASSESSMENT:  Rizwana Proctor is evolemic   Heart rhythm showing signs of NSR 1 degree AV block    Interventions completed this visit:  IV diuretics given no  Lab work obtained NO  Reviewed continue current medications that patient as prescribed answered any questions   ReEducated on signs and symptoms of HF  Re Educated on low sodium diet   heart failure information provided to the patient, and the facility too review   PLAN:  Scheduled to follow up in CHF clinic on  10/25/21  Given clinic phone number and aware of signs and symptoms to call with any HF change in symptoms.   This information was also given to the facility

## 2021-10-28 NOTE — PROGRESS NOTES
OUTPATIENT CARDIOLOGY FOLLOW-UP    Name: Annabel Kwong    Age: 80 y.o. Primary Care Physician: Nat Monteiro MD    Date of Service: 10/28/2021    Chief Complaint:   Chief Complaint   Patient presents with    Congestive Heart Failure       Interim History:   Mr. Jean Heller is a 26-year-old gentleman with history of severe dementia, type 2 diabetes not well controlled, hypertension, hypertriglyceridemia on TriCor, remote history of pericarditis, former smoker and history of hip replacement in 2018 who presented to the hospital in April 2020 with complaints of increasing dyspnea for the past 2 weeks. He was seen as a new consult and was diagnosed with second-degree AV block Mobitz type I, bilateral pulmonary emboli with a dilated RV hypertension and mixed hyperlipidemia. Patient was already on Eliquis for pulmonary embolism he was tested negative for COVID-19 pneumonia. Patient was recommended no pacemaker based on EKG findings. He was seen in the office on 9/15/2021. Since his last visit, he has not had any further hospitalizations or ER visits. He is compliant with medications, as well as salt and fluid intake. He does not take any over-the-counter arthritis medications. He was hospitalized on 8/30/2021 through 9/2/2021 for congestive heart failure. He was diuresed and changed to oral diuretic therapy and discharged back to the nursing home. Subsequently, patient was evaluated in the heart failure clinic by Margie Alarcon on 9/15/2021 and was advised to increase Bumex to twice milligrams x3 days and then 1 daily. He will be following the heart failure clinic at least once a month. He was recently diagnosed with herpes zoster involving the left side of the face and forehead and is being treated with valacyclovir. Now he has Zio patch to evaluate bradycardia    No new cardiac complaints since last cardiology evaluation.  He denies recent chest pain, SOB, palpitations, lightheadedness, dizziness, syncope, PND, or orthopnea. AF on EKG. Review of Systems:   Cardiac: As per HPI  General: No fever, chills  Pulmonary: As per HPI  HEENT: No visual disturbances, difficult swallowing  GI: No nausea, vomiting  Endocrine: No thyroid disease or DM  Musculoskeletal: DUCKWORTH x 4, no focal motor deficits  Skin: Intact, no rashes  Neuro/Psych: No headache or seizures    Past Medical History:  Past Medical History:   Diagnosis Date    Hyperlipidemia     Pericarditis        Past Surgical History:  Past Surgical History:   Procedure Laterality Date    JOINT REPLACEMENT      VASECTOMY         Family History:  No family history on file. Social History:  Social History     Socioeconomic History    Marital status:      Spouse name: Not on file    Number of children: Not on file    Years of education: Not on file    Highest education level: Not on file   Occupational History    Not on file   Tobacco Use    Smoking status: Former Smoker     Packs/day: 2.50     Years: 18.00     Pack years: 45.00     Start date: 1972     Quit date: 1990     Years since quittin.8    Smokeless tobacco: Never Used   Vaping Use    Vaping Use: Never used   Substance and Sexual Activity    Alcohol use: Not Currently    Drug use: Never    Sexual activity: Not on file   Other Topics Concern    Not on file   Social History Narrative    Not on file     Social Determinants of Health     Financial Resource Strain:     Difficulty of Paying Living Expenses:    Food Insecurity:     Worried About [address] in the Last Year:     [address] in the Last Year:    Transportation Needs:     Lack of Transportation (Medical):      Lack of Transportation (Non-Medical):    Physical Activity:     Days of Exercise per Week:     Minutes of Exercise per Session:    Stress:     Feeling of Stress :    Social Connections:     Frequency of Communication with Friends and Family:     Frequency of Social Gatherings with Friends and Family:     Attends Cheondoism Services:     Active Member of Clubs or Organizations:     Attends Club or Organization Meetings:     Marital Status:    Intimate Partner Violence:     Fear of Current or Ex-Partner:     Emotionally Abused:     Physically Abused:     Sexually Abused: Allergies:  No Known Allergies    Current Medications:  Current Outpatient Medications   Medication Sig Dispense Refill    valACYclovir (VALTREX) 1 g tablet       warfarin (COUMADIN) 3 MG tablet Take 3 mg by mouth daily       glimepiride (AMARYL) 2 MG tablet Take 4 mg by mouth 2 times daily      ciclopirox (PENLAC) 8 % solution Apply topically nightly Apply topically nightly. Affected nails      mineral oil-hydrophilic petrolatum (AQUAPHOR) ointment Apply topically as needed for Dry Skin Apply topically as needed.  lisinopril (PRINIVIL;ZESTRIL) 40 MG tablet Take 40 mg by mouth daily      empagliflozin (JARDIANCE) 10 MG tablet Take 10 mg by mouth daily      acetaminophen (TYLENOL) 325 MG tablet Take 2 tablets by mouth every 6 hours as needed for Pain (Patient taking differently: Take 500 mg by mouth every 6 hours as needed for Pain 2 tabs q6h prn)      hydrALAZINE (APRESOLINE) 100 MG tablet Take 1 tablet by mouth every 8 hours 90 tablet 0    bumetanide (BUMEX) 1 MG tablet Take 1 tablet by mouth daily 30 tablet 0    potassium chloride (KLOR-CON M) 20 MEQ extended release tablet Take 1 tablet by mouth daily 30 tablet 0    triamcinolone (KENALOG) 0.1 % cream Apply topically 2 times daily Apply topically 2 times daily.       Apoaequorin (PREVAGEN EXTRA STRENGTH) 20 MG CAPS Take 1 capsule by mouth daily 90 capsule 3    donepezil (ARICEPT) 10 MG tablet Take 1 tablet by mouth daily 30 tablet 5    magnesium hydroxide (MILK OF MAGNESIA) 400 MG/5ML suspension Take 30 mLs by mouth daily as needed for Constipation      ARTIFICIAL TEAR OP Place 2 drops into the right eye every 4 hours as needed       vitamin B-12 (CYANOCOBALAMIN) 1000 MCG tablet Take 1,000 mcg by mouth daily       memantine (NAMENDA) 10 MG tablet Take 10 mg by mouth 2 times daily      fenofibrate (TRICOR) 145 MG tablet Take 145 mg by mouth daily      erythromycin (ROMYCIN) 5 MG/GM ophthalmic ointment        No current facility-administered medications for this visit. Physical Exam:  There were no vitals taken for this visit. Wt Readings from Last 3 Encounters:   10/19/21 220 lb 9.6 oz (100.1 kg)   10/12/21 224 lb 6.4 oz (101.8 kg)   09/28/21 229 lb 3.2 oz (104 kg)     Appearance: Awake, alert and oriented x 3, no acute respiratory distress  Skin: Intact, no rash  Head: Normocephalic, atraumatic  Eyes: EOMI, no conjunctival erythema  ENMT: No pharyngeal erythema, MMM, no rhinorrhea  Neck: Supple, no elevated JVP, no carotid bruits  Lungs: Clear to auscultation bilaterally. No wheezes, rales, or rhonchi.   Cardiac: Regular rate and rhythm, +S1S2, no murmurs apparent  Abdomen: Soft, nontender, +bowel sounds  Extremities: Moves all extremities x 4, trace lower extremity edema  Neurologic: No focal motor deficits apparent, normal mood and affect, alert and oriented x 1  Peripheral Pulses: Intact posterior tibial pulses bilaterally    Laboratory Tests:  Lab Results   Component Value Date    CREATININE 1.6 (H) 10/12/2021    BUN 16 10/12/2021     10/12/2021    K 4.3 10/12/2021     10/12/2021    CO2 23 10/12/2021     Lab Results   Component Value Date    MG 1.9 08/30/2021     Lab Results   Component Value Date    WBC 6.7 08/30/2021    HGB 13.9 08/30/2021    HCT 43.0 08/30/2021    MCV 91.3 08/30/2021     08/30/2021     Lab Results   Component Value Date    ALT 9 08/30/2021    AST 27 08/30/2021    ALKPHOS 49 08/30/2021    BILITOT 0.4 08/30/2021     Lab Results   Component Value Date    TROPONINI 0.03 04/19/2020     Lab Results   Component Value Date    INR 2.1 09/02/2021    INR 2.2 09/01/2021    INR 2.2 08/31/2021 somewhat suspicious. A   follow-up CT of the chest in a few months is recommended.       Diffuse interstitial lung disease and peripheral honeycombing.       Scattered patchy groundglass densities may reflect manifestations of   interstitial disease or acute infection.          Stress test:        Cardiac catheterization:     The ASCVD Risk score (Melanie Isaac, et al., 2013) failed to calculate for the following reasons: The 2013 ASCVD risk score is only valid for ages 36 to 78        ASSESSMENT:  · Chronic HFpEF, euvolemic  · Episodes of second-degree AV block Mobitz type I, first-degree AV block with blocked  sinus beats, no indication for pacemaker  · New onset Afib based on EKG, now back in sinus rhythm  · Bradycardia, improved. · Bilateral pulmonary emboli with mild RV strain with moderately dilated RV and RV function is well preserved. · Moderate pulmonary hypertension secondary submassive PE   · Hypertension, well controlled on lisinopril  · Type 2 diabetes not well controlled  · Mixed hyperlipidemia on fenofibrate.    · Severe dementia  · Mild peripheral edema secondary to Norvasc>> resolved. · Herpes on Valtrex zoster    Plan:   · Continue hydralazine 100 mg p.o. 3 times daily, lisinopril 40 mg daily,  for hypertension  · Continue Bumex 1 mg p.o. daily  · Continue Warfarin for anticoagulation  · Avoid beta-blockers and calcium channel blockers due to underlying first-degree AV block and prior history of Mobitz type I block. We will follow up on Zio patch results to see any episodes of pauses or severe bradycardia. · No indication for pacemaker at this time. We will follow up on Zio patch results and then will decide about whether he needs a pacemaker or not. · Patient and his daughter were advised to call us if he develops any dizzy spells or syncope. · Follow-up with me in 6 months.     The patient's current medication list, allergies, problem list and results of all previously ordered testing were reviewed at today's visit.     Jorge A Clarke MD  [facility] ([facility]) Cardiology

## 2021-10-28 NOTE — PATIENT INSTRUCTIONS
· Continue hydralazine 100 mg p.o. 3 times daily, lisinopril 40 mg daily,  for hypertension  · Continue Bumex 1 mg p.o. daily  · Continue Warfarin for anticoagulation  · Avoid beta-blockers and calcium channel blockers due to underlying first-degree AV block and prior history of Mobitz type I block. We will follow up on Zio patch results to see any episodes of pauses or severe bradycardia. · No indication for pacemaker at this time. We will follow up on Zio patch results and then will decide about whether he needs a pacemaker or not. · Patient and his daughter were advised to call us if he develops any dizzy spells or syncope. · Follow-up with me in 6 months.

## 2021-11-01 NOTE — PROGRESS NOTES
[address] [place] [address] “B” [place]   1934            Referring Provider: [facility]    Primary Care Physician: Dr. Tia Zelaya   Cardiologist: Dr Jania Crocker   Nephrologist:          History of Present Illness:     Rancho Benites is a 80 y.o. male with a history of HFpEF, most recent EF 60-65% Echo 4/23/20. Patient Story:    He does not  have dyspnea with exertion, shortness of breath, or decline in overall functional capacity. He does not have orthopnea, PND, nocturnal cough or hemoptysis. He does not have abdominal distention or bloating, early satiety, anorexia/change in appetite. He does has a good urinary response to  oral diuretic. He does have  lower extremity edema. He denies lightheadedness, dizziness. He denies palpitations, syncope or near syncope. He does not complain of chest pain, pressure, discomfort. No Known Allergies      Outpatient Medications Marked as Taking for the 11/1/21 encounter [facility] Encounter) with [facility] CHF ROOM 1   Medication Sig Dispense Refill    valACYclovir (VALTREX) 1 g tablet 3 times daily       warfarin (COUMADIN) 3 MG tablet Take 2 mg by mouth daily       glimepiride (AMARYL) 2 MG tablet Take 4 mg by mouth 2 times daily      ciclopirox (PENLAC) 8 % solution Apply topically nightly Apply topically nightly.  Affected nails      lisinopril (PRINIVIL;ZESTRIL) 40 MG tablet Take 40 mg by mouth daily      empagliflozin (JARDIANCE) 10 MG tablet Take 10 mg by mouth daily      acetaminophen (TYLENOL) 325 MG tablet Take 2 tablets by mouth every 6 hours as needed for Pain (Patient taking differently: Take 500 mg by mouth every 6 hours as needed for Pain 2 tabs q6h prn)      hydrALAZINE (APRESOLINE) 100 MG tablet Take 1 tablet by mouth every 8 hours 90 tablet 0    bumetanide (BUMEX) 1 MG tablet Take 1 tablet by mouth daily 30 tablet 0    potassium chloride (KLOR-CON M) 20 MEQ extended release tablet Take 1 tablet by mouth daily 30 tablet 0    triamcinolone (KENALOG) 0.1 % cream Apply topically 2 times daily Apply topically 2 times daily.  Apoaequorin (PREVAGEN EXTRA STRENGTH) 20 MG CAPS Take 1 capsule by mouth daily 90 capsule 3    donepezil (ARICEPT) 10 MG tablet Take 1 tablet by mouth daily 30 tablet 5    magnesium hydroxide (MILK OF MAGNESIA) 400 MG/5ML suspension Take 30 mLs by mouth daily as needed for Constipation      ARTIFICIAL TEAR OP Place 2 drops into the right eye every 4 hours as needed       vitamin B-12 (CYANOCOBALAMIN) 1000 MCG tablet Take 1,000 mcg by mouth daily       memantine (NAMENDA) 10 MG tablet Take 10 mg by mouth 2 times daily      fenofibrate (TRICOR) 145 MG tablet Take 145 mg by mouth daily             Guideline directed medical:  ARNI/ACE I/ARB: Yes  Beta blocker:  No  Aldosterone antagonist:  No        Physical Examination:     BP (!) 94/53   Pulse 73   Temp 97.7 °F (36.5 °C) (Oral)   Resp 18   SpO2 94%          Neurological  Level of Consciousness: Alert (0)  Orientation Level: Oriented to person, Disoriented to place, Disoriented to time, Disoriented to situation         HEENT  HEENT (WDL): Exceptions to WDL  Right Eye: Impaired vision  Left Eye: Impaired vision, Other (Comment) (wears glasses )    Respiratory  Respiratory Pattern: Regular  L Breath Sounds: Clear  R Breath Sounds: Clear              Cardiac  Cardiac Rhythm: Sinus rhythm    Rhythm Interpretation  Pulse: 73         Gastrointestinal  Abdominal (WDL): Exceptions to WDL  Abdomen Inspection: Rounded  RUQ Bowel Sounds: Active  LUQ Bowel Sounds: Active  RLQ Bowel Sounds: Active  LLQ Bowel Sounds: Active          Bowel Sounds  RUQ Bowel Sounds: Active  LUQ Bowel Sounds: Active  RLQ Bowel Sounds: Active  LLQ Bowel Sounds:  Active    Peripheral Vascular  Peripheral Vascular (WDL): Exceptions to WDL  RLE Edema: +1, Pitting  LLE Edema: +1, Pitting                                                 Pulse: 73                   LAB DATA:    BNP  No results for input(s): BNP in the last 72 hours. proBNP  No results for input(s): PROBNP in the last 72 hours. BMP  No results for input(s): NA, K, CL, CO2, BUN, CREATININE, GLUCOSE, CALCIUM in the last 72 hours. WEIGHTS:  Wt Readings from Last 3 Encounters:   10/19/21 220 lb 9.6 oz (100.1 kg)   10/12/21 224 lb 6.4 oz (101.8 kg)   09/28/21 229 lb 3.2 oz (104 kg)         TELEMETRY:  Cardiac Regularity: regular NSR  Cardiac Rhythm/Interpretation: NSR        ASSESSMENT:  Rancho Benites is evolemic with stable weights  Heart rhythm showing signs of NSR. Patient has Zio patch on for Bradycardia    Interventions completed this visit:  IV diuretics given no  Lab work obtained Yes  Reviewed continue current medications that patient as prescribed answered any questions   ReEducated on signs and symptoms of HF  Re Educated on low sodium diet   heart failure information provided to the patient, and the facility too review   PLAN:  Scheduled to follow up in CHF clinic on  11/15/21 2pm  Given clinic phone number and aware of signs and symptoms to call with any HF change in symptoms.   This information was also given to the facility

## 2021-11-01 NOTE — PLAN OF CARE
Problem: HEMODYNAMIC STATUS  Goal: Patient has stable vital signs and fluid balance  Outcome: Ongoing     Problem: FLUID AND ELECTROLYTE IMBALANCE  Goal: Fluid and electrolyte balance are achieved/maintained  Outcome: Ongoing     Problem: ACTIVITY INTOLERANCE/IMPAIRED MOBILITY  Goal: Mobility/activity is maintained at optimum level for patient  Outcome: Ongoing

## 2021-11-03 NOTE — PROGRESS NOTES
Chief Complaint   Patient presents with    1 Year Follow Up     July 2020 [facility] follow up. Currently resides at the [facility], will be moving to [employer]. Here with daughter, Ahsan Fiore.  Hypotension     86/56 -- Dr Asuncion Dietrich notified & checked pt. Cup of hot tea provided. BP after the tea 90/60. Pulse 54. Dr Asuncion Dietrich notified & rechecked pt prior to discharge from [address] [place] [address] Rd. Assisted pt via wheelchair to the car on discharge from [address] [place] [address] Rd. He was able to stand on his own & get into the vehicle without difficulty or complaint. Daughter to transport pt back to [facility].

## 2021-11-03 NOTE — PATIENT INSTRUCTIONS
Patient Education        Preventing Falls: Care Instructions  Your Care Instructions     Getting around your home safely can be a challenge if you have injuries or health problems that make it easy for you to fall. Loose rugs and furniture in walkways are among the dangers for many older people who have problems walking or who have poor eyesight. People who have conditions such as arthritis, osteoporosis, or dementia also have to be careful not to fall. You can make your home safer with a few simple measures. Follow-up care is a key part of your treatment and safety. Be sure to make and go to all appointments, and call your doctor if you are having problems. It's also a good idea to know your test results and keep a list of the medicines you take. How can you care for yourself at home? Taking care of yourself  · You may get dizzy if you do not drink enough water. To prevent dehydration, drink plenty of fluids. Choose water and other clear liquids. If you have kidney, heart, or liver disease and have to limit fluids, talk with your doctor before you increase the amount of fluids you drink. · Exercise regularly to improve your strength, muscle tone, and balance. Walk if you can. Swimming may be a good choice if you cannot walk easily. · Have your vision and hearing checked each year or any time you notice a change. If you have trouble seeing and hearing, you might not be able to avoid objects and could lose your balance. · Know the side effects of the medicines you take. Ask your doctor or pharmacist whether the medicines you take can affect your balance. Sleeping pills or sedatives can affect your balance. · Limit the amount of alcohol you drink. Alcohol can impair your balance and other senses. · Ask your doctor whether calluses or corns on your feet need to be removed. If you wear loose-fitting shoes because of calluses or corns, you can lose your balance and fall.   · Talk to your doctor if you have numbness in your feet. Preventing falls at home  · Remove raised doorway thresholds, throw rugs, and clutter. Repair loose carpet or raised areas in the floor. · Move furniture and electrical cords to keep them out of walking paths. · Use nonskid floor wax, and wipe up spills right away, especially on ceramic tile floors. · If you use a walker or cane, put rubber tips on it. If you use crutches, clean the bottoms of them regularly with an abrasive pad, such as steel wool. · Keep your house well lit, especially [place], and outside walkways. Use night-lights in areas such as hallways and bathrooms. Add extra light switches or use remote switches (such as switches that go on or off when you clap your hands) to make it easier to turn lights on if you have to get up during the night. · Install sturdy handrails on stairways. · Move items in your cabinets so that the things you use a lot are on the lower shelves (about waist level). · Keep a cordless phone and a flashlight with new batteries by your bed. If possible, put a phone in each of the main rooms of your house, or carry a cell phone in case you fall and cannot reach a phone. Or, you can wear a device around your neck or wrist. You push a button that sends a signal for help. · Wear low-heeled shoes that fit well and give your feet good support. Use footwear with nonskid soles. Check the heels and soles of your shoes for wear. Repair or replace worn heels or soles. · Do not wear socks without shoes on wood floors. · Walk on the grass when the sidewalks are slippery. If you live in an area that gets snow and ice in the winter, sprinkle salt on slippery steps and sidewalks. Preventing falls in the bath  · Install grab bars and nonskid mats inside and outside your shower or tub and near the toilet and sinks. · Use shower chairs and bath benches. · Use a hand-held shower head that will allow you to sit while showering.   · Get into a tub or shower by putting the weaker leg in first. Get out of a tub or shower with your strong side first.  · Repair loose toilet seats and consider installing a raised toilet seat to make getting on and off the toilet easier. · Keep your bathroom door unlocked while you are in the shower. Where can you learn more? Go to https://[email or web address].DoYouRemember. org and sign in to your [employer] account. Enter [phone] in the [facility] box to learn more about \"Preventing Falls: Care Instructions. \"     If you do not have an account, please click on the \"Sign Up Now\" link. Current as of: December 7, 2020               Content Version: 13.0  © 2006-2021 [employer]. Care instructions adapted under license by [facility] ([facility]). If you have questions about a medical condition or this instruction, always ask your healthcare professional. [place] [address] any warranty or liability for your use of this information.

## 2021-11-03 NOTE — PROGRESS NOTES
CC Recheck Dementia and low BP    Here with daughter   Recently admitted to hospiial with severe CHF after being off Lasix for a month at  [facility]  To be moved to [facility] soon   Recovering from Zoster as well over left forehead, with eye  Involvement  Memory has taken a hit with above illnesses and moves  Alert today   ADL's same with assistance   Eating poorly   Interested in NO trial   MMSE about the same animals less   In Wheelchair   Check of BP by nurse was 30/Q systolic  And he had eaten and drank little all day  After cup of tea BP came up to 96/x and pulse in 50's. Sent back  To [facility], with instructions to hold tonite's hydralazine and push fluids. Spoke to Dr. Estella Cruz and facility and updated them on matter.   Impression:LOW BP FROM VOLUME                     SDAT with acute medical                              llnesses over 2 months                      Probable Depression                      Poor  appetite   Plan: Mirtazepine 7.5 mg hs             Other meds the same             Push fluids  And follow BP   Hold hydrazine tonite            Consider BMP and CBC as discussed with Estella Cruz

## 2021-11-15 NOTE — TELEPHONE ENCOUNTER
Patient did not come to appointment for CHF clinic. Call placed to [facility] and they informed that patient is now residing at [facility] and phone number is [phone]. Call placed to [facility] and they are arranging transportation with daughter Sandhya Clark for future visits and patient unable to come today.   Electronically signed by Shai Turner RN on 11/15/2021 at 2:37 PM

## 2021-11-18 NOTE — PATIENT INSTRUCTIONS
· Continue benazepril and amlodipine for hypertension  · Continue Warfarin for anticoagulation  · Avoid beta-blockers and calcium channel blockers due to underlying first-degree AV block and prior history of Mobitz type I block. · No indication for pacemaker at this time. · Patient and his daughter were advised to call us if he develops any dizzy spells or syncope. · Follow-up with me in 6 months.
Yes

## 2022-01-01 ENCOUNTER — APPOINTMENT (OUTPATIENT)
Dept: NUCLEAR MEDICINE | Age: 87
DRG: 180 | End: 2022-01-01
Payer: MEDICARE

## 2022-01-01 ENCOUNTER — HOSPITAL ENCOUNTER (INPATIENT)
Age: 87
LOS: 6 days | Discharge: NURSING, REHAB OR LONG-TERM CARE | DRG: 180 | End: 2022-06-02
Attending: EMERGENCY MEDICINE | Admitting: INTERNAL MEDICINE
Payer: MEDICARE

## 2022-01-01 ENCOUNTER — APPOINTMENT (OUTPATIENT)
Dept: CT IMAGING | Age: 87
DRG: 180 | End: 2022-01-01
Payer: MEDICARE

## 2022-01-01 ENCOUNTER — APPOINTMENT (OUTPATIENT)
Dept: GENERAL RADIOLOGY | Age: 87
DRG: 180 | End: 2022-01-01
Payer: MEDICARE

## 2022-01-01 VITALS
HEART RATE: 43 BPM | SYSTOLIC BLOOD PRESSURE: 169 MMHG | HEIGHT: 68 IN | BODY MASS INDEX: 28.1 KG/M2 | TEMPERATURE: 98 F | RESPIRATION RATE: 16 BRPM | WEIGHT: 185.41 LBS | DIASTOLIC BLOOD PRESSURE: 70 MMHG | OXYGEN SATURATION: 93 %

## 2022-01-01 DIAGNOSIS — E83.52 HYPERCALCEMIA: Primary | ICD-10-CM

## 2022-01-01 LAB
ALBUMIN SERPL-MCNC: 2.1 G/DL (ref 3.5–5.2)
ALBUMIN SERPL-MCNC: 2.2 G/DL (ref 3.5–4.7)
ALBUMIN SERPL-MCNC: 2.3 G/DL (ref 3.5–5.2)
ALBUMIN SERPL-MCNC: 2.4 G/DL (ref 3.5–5.2)
ALBUMIN SERPL-MCNC: 2.5 G/DL (ref 3.5–5.2)
ALBUMIN SERPL-MCNC: 2.7 G/DL (ref 3.5–5.2)
ALBUMIN SERPL-MCNC: 2.8 G/DL (ref 3.5–5.2)
ALBUMIN SERPL-MCNC: 2.8 G/DL (ref 3.5–5.2)
ALBUMIN SERPL-MCNC: 2.9 G/DL (ref 3.5–5.2)
ALBUMIN SERPL-MCNC: 3 G/DL (ref 3.5–5.2)
ALBUMIN SERPL-MCNC: 3.1 G/DL (ref 3.5–5.2)
ALBUMIN SERPL-MCNC: 3.4 G/DL (ref 3.5–5.2)
ALP BLD-CCNC: 102 U/L (ref 40–129)
ALP BLD-CCNC: 103 U/L (ref 40–129)
ALP BLD-CCNC: 105 U/L (ref 40–129)
ALP BLD-CCNC: 106 U/L (ref 40–129)
ALP BLD-CCNC: 106 U/L (ref 40–129)
ALP BLD-CCNC: 113 U/L (ref 40–129)
ALP BLD-CCNC: 77 U/L (ref 40–129)
ALP BLD-CCNC: 88 U/L (ref 40–129)
ALP BLD-CCNC: 92 U/L (ref 40–129)
ALP BLD-CCNC: 94 U/L (ref 40–129)
ALP BLD-CCNC: 96 U/L (ref 40–129)
ALP BLD-CCNC: 97 U/L (ref 40–129)
ALP BLD-CCNC: 99 U/L (ref 40–129)
ALPHA-1-GLOBULIN: 0.4 G/DL (ref 0.2–0.4)
ALPHA-2-GLOBULIN: 1.1 G/DL (ref 0.5–1)
ALT SERPL-CCNC: 13 U/L (ref 0–40)
ALT SERPL-CCNC: 14 U/L (ref 0–40)
ALT SERPL-CCNC: 16 U/L (ref 0–40)
ALT SERPL-CCNC: 16 U/L (ref 0–40)
ALT SERPL-CCNC: 18 U/L (ref 0–40)
ALT SERPL-CCNC: 20 U/L (ref 0–40)
ALT SERPL-CCNC: 21 U/L (ref 0–40)
ALT SERPL-CCNC: 22 U/L (ref 0–40)
ALT SERPL-CCNC: 23 U/L (ref 0–40)
ALT SERPL-CCNC: 24 U/L (ref 0–40)
ALT SERPL-CCNC: 28 U/L (ref 0–40)
ANION GAP SERPL CALCULATED.3IONS-SCNC: 10 MMOL/L (ref 7–16)
ANION GAP SERPL CALCULATED.3IONS-SCNC: 11 MMOL/L (ref 7–16)
ANION GAP SERPL CALCULATED.3IONS-SCNC: 11 MMOL/L (ref 7–16)
ANION GAP SERPL CALCULATED.3IONS-SCNC: 12 MMOL/L (ref 7–16)
ANION GAP SERPL CALCULATED.3IONS-SCNC: 13 MMOL/L (ref 7–16)
ANION GAP SERPL CALCULATED.3IONS-SCNC: 14 MMOL/L (ref 7–16)
ANION GAP SERPL CALCULATED.3IONS-SCNC: 9 MMOL/L (ref 7–16)
AST SERPL-CCNC: 29 U/L (ref 0–39)
AST SERPL-CCNC: 32 U/L (ref 0–39)
AST SERPL-CCNC: 35 U/L (ref 0–39)
AST SERPL-CCNC: 36 U/L (ref 0–39)
AST SERPL-CCNC: 38 U/L (ref 0–39)
AST SERPL-CCNC: 39 U/L (ref 0–39)
AST SERPL-CCNC: 41 U/L (ref 0–39)
AST SERPL-CCNC: 41 U/L (ref 0–39)
AST SERPL-CCNC: 43 U/L (ref 0–39)
AST SERPL-CCNC: 44 U/L (ref 0–39)
AST SERPL-CCNC: 47 U/L (ref 0–39)
AST SERPL-CCNC: 48 U/L (ref 0–39)
AST SERPL-CCNC: 51 U/L (ref 0–39)
BASOPHILS ABSOLUTE: 0.06 E9/L (ref 0–0.2)
BASOPHILS ABSOLUTE: 0.07 E9/L (ref 0–0.2)
BASOPHILS RELATIVE PERCENT: 0.6 % (ref 0–2)
BASOPHILS RELATIVE PERCENT: 0.8 % (ref 0–2)
BETA GLOBULIN: 1 G/DL (ref 0.8–1.3)
BILIRUB SERPL-MCNC: 0.4 MG/DL (ref 0–1.2)
BILIRUB SERPL-MCNC: 0.5 MG/DL (ref 0–1.2)
BILIRUB SERPL-MCNC: 1.3 MG/DL (ref 0–1.2)
BUN BLDV-MCNC: 14 MG/DL (ref 6–23)
BUN BLDV-MCNC: 15 MG/DL (ref 6–23)
BUN BLDV-MCNC: 18 MG/DL (ref 6–23)
BUN BLDV-MCNC: 20 MG/DL (ref 6–23)
BUN BLDV-MCNC: 21 MG/DL (ref 6–23)
BUN BLDV-MCNC: 22 MG/DL (ref 6–23)
BUN BLDV-MCNC: 23 MG/DL (ref 6–23)
CALCIUM IONIZED: 1.87 MMOL/L (ref 1.15–1.33)
CALCIUM IONIZED: 1.88 MMOL/L (ref 1.15–1.33)
CALCIUM SERPL-MCNC: 10.2 MG/DL (ref 8.6–10.2)
CALCIUM SERPL-MCNC: 10.6 MG/DL (ref 8.6–10.2)
CALCIUM SERPL-MCNC: 11.7 MG/DL (ref 8.6–10.2)
CALCIUM SERPL-MCNC: 12.2 MG/DL (ref 8.6–10.2)
CALCIUM SERPL-MCNC: 12.5 MG/DL (ref 8.6–10.2)
CALCIUM SERPL-MCNC: 12.5 MG/DL (ref 8.6–10.2)
CALCIUM SERPL-MCNC: 12.7 MG/DL (ref 8.6–10.2)
CALCIUM SERPL-MCNC: 12.8 MG/DL (ref 8.6–10.2)
CALCIUM SERPL-MCNC: 12.9 MG/DL (ref 8.6–10.2)
CALCIUM SERPL-MCNC: 13 MG/DL (ref 8.6–10.2)
CALCIUM SERPL-MCNC: 13.2 MG/DL (ref 8.6–10.2)
CALCIUM SERPL-MCNC: 13.3 MG/DL (ref 8.6–10.2)
CALCIUM SERPL-MCNC: 15.4 MG/DL (ref 8.6–10.2)
CHLORIDE BLD-SCNC: 102 MMOL/L (ref 98–107)
CHLORIDE BLD-SCNC: 107 MMOL/L (ref 98–107)
CHLORIDE BLD-SCNC: 108 MMOL/L (ref 98–107)
CHLORIDE BLD-SCNC: 109 MMOL/L (ref 98–107)
CHLORIDE BLD-SCNC: 109 MMOL/L (ref 98–107)
CHLORIDE BLD-SCNC: 110 MMOL/L (ref 98–107)
CHLORIDE BLD-SCNC: 111 MMOL/L (ref 98–107)
CHLORIDE BLD-SCNC: 112 MMOL/L (ref 98–107)
CHLORIDE BLD-SCNC: 112 MMOL/L (ref 98–107)
CHLORIDE BLD-SCNC: 113 MMOL/L (ref 98–107)
CHLORIDE BLD-SCNC: 114 MMOL/L (ref 98–107)
CHLORIDE BLD-SCNC: 114 MMOL/L (ref 98–107)
CHLORIDE BLD-SCNC: 116 MMOL/L (ref 98–107)
CHLORIDE URINE RANDOM: 73 MMOL/L
CO2: 18 MMOL/L (ref 22–29)
CO2: 19 MMOL/L (ref 22–29)
CO2: 21 MMOL/L (ref 22–29)
CO2: 22 MMOL/L (ref 22–29)
CO2: 22 MMOL/L (ref 22–29)
CO2: 23 MMOL/L (ref 22–29)
CO2: 24 MMOL/L (ref 22–29)
CO2: 24 MMOL/L (ref 22–29)
CREAT SERPL-MCNC: 0.9 MG/DL (ref 0.7–1.2)
CREAT SERPL-MCNC: 1 MG/DL (ref 0.7–1.2)
CREAT SERPL-MCNC: 1.1 MG/DL (ref 0.7–1.2)
CREAT SERPL-MCNC: 1.2 MG/DL (ref 0.7–1.2)
CREAT SERPL-MCNC: 1.2 MG/DL (ref 0.7–1.2)
CREAT SERPL-MCNC: 1.3 MG/DL (ref 0.7–1.2)
CREATININE URINE: 33 MG/DL (ref 40–278)
EKG ATRIAL RATE: 19 BPM
EKG Q-T INTERVAL: 470 MS
EKG QRS DURATION: 126 MS
EKG QTC CALCULATION (BAZETT): 378 MS
EKG R AXIS: -44 DEGREES
EKG T AXIS: -10 DEGREES
EKG VENTRICULAR RATE: 39 BPM
ELECTROPHORESIS: ABNORMAL
EOSINOPHILS ABSOLUTE: 0.1 E9/L (ref 0.05–0.5)
EOSINOPHILS ABSOLUTE: 0.1 E9/L (ref 0.05–0.5)
EOSINOPHILS RELATIVE PERCENT: 1 % (ref 0–6)
EOSINOPHILS RELATIVE PERCENT: 1.1 % (ref 0–6)
GAMMA GLOBULIN: 1.6 G/DL (ref 0.7–1.6)
GFR AFRICAN AMERICAN: >60
GFR NON-AFRICAN AMERICAN: 52 ML/MIN/1.73
GFR NON-AFRICAN AMERICAN: 57 ML/MIN/1.73
GFR NON-AFRICAN AMERICAN: 57 ML/MIN/1.73
GFR NON-AFRICAN AMERICAN: >60 ML/MIN/1.73
GLUCOSE BLD-MCNC: 101 MG/DL (ref 74–99)
GLUCOSE BLD-MCNC: 103 MG/DL (ref 74–99)
GLUCOSE BLD-MCNC: 108 MG/DL (ref 74–99)
GLUCOSE BLD-MCNC: 109 MG/DL (ref 74–99)
GLUCOSE BLD-MCNC: 124 MG/DL (ref 74–99)
GLUCOSE BLD-MCNC: 131 MG/DL (ref 74–99)
GLUCOSE BLD-MCNC: 135 MG/DL (ref 74–99)
GLUCOSE BLD-MCNC: 143 MG/DL (ref 74–99)
GLUCOSE BLD-MCNC: 144 MG/DL (ref 74–99)
GLUCOSE BLD-MCNC: 84 MG/DL (ref 74–99)
GLUCOSE BLD-MCNC: 96 MG/DL (ref 74–99)
GLUCOSE BLD-MCNC: 98 MG/DL (ref 74–99)
GLUCOSE BLD-MCNC: 99 MG/DL (ref 74–99)
HCT VFR BLD CALC: 40.4 % (ref 37–54)
HCT VFR BLD CALC: 42.5 % (ref 37–54)
HCT VFR BLD CALC: 42.7 % (ref 37–54)
HEMOGLOBIN: 12.5 G/DL (ref 12.5–16.5)
HEMOGLOBIN: 13 G/DL (ref 12.5–16.5)
HEMOGLOBIN: 13.2 G/DL (ref 12.5–16.5)
IMMATURE GRANULOCYTES #: 0.03 E9/L
IMMATURE GRANULOCYTES #: 0.04 E9/L
IMMATURE GRANULOCYTES %: 0.3 % (ref 0–5)
IMMATURE GRANULOCYTES %: 0.4 % (ref 0–5)
IMMUNOFIXATION RESULT, SERUM: NORMAL
INR BLD: 1.4
INR BLD: 2.1
INR BLD: 3.1
INR BLD: 3.4
INR BLD: 4
INR BLD: 4.4
INR BLD: 5.3
LYMPHOCYTES ABSOLUTE: 1.41 E9/L (ref 1.5–4)
LYMPHOCYTES ABSOLUTE: 1.7 E9/L (ref 1.5–4)
LYMPHOCYTES RELATIVE PERCENT: 15.2 % (ref 20–42)
LYMPHOCYTES RELATIVE PERCENT: 17.6 % (ref 20–42)
MAGNESIUM: 1.8 MG/DL (ref 1.6–2.6)
MAGNESIUM: 1.9 MG/DL (ref 1.6–2.6)
MAGNESIUM: 2.1 MG/DL (ref 1.6–2.6)
MAGNESIUM: 2.3 MG/DL (ref 1.6–2.6)
MAGNESIUM: 2.3 MG/DL (ref 1.6–2.6)
MAGNESIUM: 2.5 MG/DL (ref 1.6–2.6)
MCH RBC QN AUTO: 26.6 PG (ref 26–35)
MCH RBC QN AUTO: 26.7 PG (ref 26–35)
MCH RBC QN AUTO: 27.2 PG (ref 26–35)
MCHC RBC AUTO-ENTMCNC: 30.4 % (ref 32–34.5)
MCHC RBC AUTO-ENTMCNC: 30.9 % (ref 32–34.5)
MCHC RBC AUTO-ENTMCNC: 31.1 % (ref 32–34.5)
MCV RBC AUTO: 86.1 FL (ref 80–99.9)
MCV RBC AUTO: 87.5 FL (ref 80–99.9)
MCV RBC AUTO: 87.6 FL (ref 80–99.9)
METER GLUCOSE: 106 MG/DL (ref 74–99)
METER GLUCOSE: 112 MG/DL (ref 74–99)
METER GLUCOSE: 126 MG/DL (ref 74–99)
METER GLUCOSE: 129 MG/DL (ref 74–99)
METER GLUCOSE: 140 MG/DL (ref 74–99)
METER GLUCOSE: 143 MG/DL (ref 74–99)
METER GLUCOSE: 143 MG/DL (ref 74–99)
METER GLUCOSE: 144 MG/DL (ref 74–99)
METER GLUCOSE: 145 MG/DL (ref 74–99)
METER GLUCOSE: 150 MG/DL (ref 74–99)
METER GLUCOSE: 156 MG/DL (ref 74–99)
METER GLUCOSE: 179 MG/DL (ref 74–99)
METER GLUCOSE: 193 MG/DL (ref 74–99)
METER GLUCOSE: 244 MG/DL (ref 74–99)
METER GLUCOSE: 290 MG/DL (ref 74–99)
METER GLUCOSE: 71 MG/DL (ref 74–99)
METER GLUCOSE: 81 MG/DL (ref 74–99)
METER GLUCOSE: 85 MG/DL (ref 74–99)
METER GLUCOSE: 86 MG/DL (ref 74–99)
METER GLUCOSE: 88 MG/DL (ref 74–99)
METER GLUCOSE: 88 MG/DL (ref 74–99)
METER GLUCOSE: 90 MG/DL (ref 74–99)
METER GLUCOSE: 97 MG/DL (ref 74–99)
METER GLUCOSE: 99 MG/DL (ref 74–99)
MONOCYTES ABSOLUTE: 0.92 E9/L (ref 0.1–0.95)
MONOCYTES ABSOLUTE: 1.06 E9/L (ref 0.1–0.95)
MONOCYTES RELATIVE PERCENT: 11 % (ref 2–12)
MONOCYTES RELATIVE PERCENT: 9.9 % (ref 2–12)
NEUTROPHILS ABSOLUTE: 6.7 E9/L (ref 1.8–7.3)
NEUTROPHILS ABSOLUTE: 6.76 E9/L (ref 1.8–7.3)
NEUTROPHILS RELATIVE PERCENT: 69.5 % (ref 43–80)
NEUTROPHILS RELATIVE PERCENT: 72.6 % (ref 43–80)
OSMOLALITY URINE: 417 MOSM/KG (ref 300–900)
PARATHYROID HORMONE INTACT: 9 PG/ML (ref 15–65)
PDW BLD-RTO: 16.1 FL (ref 11.5–15)
PDW BLD-RTO: 16.3 FL (ref 11.5–15)
PDW BLD-RTO: 16.3 FL (ref 11.5–15)
PHOSPHORUS: 1.9 MG/DL (ref 2.5–4.5)
PHOSPHORUS: 1.9 MG/DL (ref 2.5–4.5)
PHOSPHORUS: 2.2 MG/DL (ref 2.5–4.5)
PHOSPHORUS: 2.3 MG/DL (ref 2.5–4.5)
PHOSPHORUS: 2.8 MG/DL (ref 2.5–4.5)
PLATELET # BLD: 305 E9/L (ref 130–450)
PLATELET # BLD: 332 E9/L (ref 130–450)
PLATELET # BLD: 357 E9/L (ref 130–450)
PMV BLD AUTO: 11.5 FL (ref 7–12)
PMV BLD AUTO: 11.7 FL (ref 7–12)
PMV BLD AUTO: 11.8 FL (ref 7–12)
POTASSIUM REFLEX MAGNESIUM: 3.5 MMOL/L (ref 3.5–5)
POTASSIUM REFLEX MAGNESIUM: 3.5 MMOL/L (ref 3.5–5)
POTASSIUM REFLEX MAGNESIUM: 3.6 MMOL/L (ref 3.5–5)
POTASSIUM REFLEX MAGNESIUM: 3.7 MMOL/L (ref 3.5–5)
POTASSIUM REFLEX MAGNESIUM: 4.6 MMOL/L (ref 3.5–5)
POTASSIUM REFLEX MAGNESIUM: 4.7 MMOL/L (ref 3.5–5)
POTASSIUM SERPL-SCNC: 3.3 MMOL/L (ref 3.5–5)
POTASSIUM SERPL-SCNC: 3.4 MMOL/L (ref 3.5–5)
POTASSIUM SERPL-SCNC: 3.8 MMOL/L (ref 3.5–5)
POTASSIUM SERPL-SCNC: 3.9 MMOL/L (ref 3.5–5)
PROTEIN PROTEIN: 7 MG/DL (ref 0–12)
PROTEIN/CREAT RATIO: 0.2
PROTEIN/CREAT RATIO: 0.2 (ref 0–0.2)
PROTHROMBIN TIME: 15.6 SEC (ref 9.3–12.4)
PROTHROMBIN TIME: 22.7 SEC (ref 9.3–12.4)
PROTHROMBIN TIME: 34.9 SEC (ref 9.3–12.4)
PROTHROMBIN TIME: 37.4 SEC (ref 9.3–12.4)
PROTHROMBIN TIME: 44.6 SEC (ref 9.3–12.4)
PROTHROMBIN TIME: 47.5 SEC (ref 9.3–12.4)
PROTHROMBIN TIME: 58.3 SEC (ref 9.3–12.4)
RBC # BLD: 4.69 E12/L (ref 3.8–5.8)
RBC # BLD: 4.85 E12/L (ref 3.8–5.8)
RBC # BLD: 4.88 E12/L (ref 3.8–5.8)
REASON FOR REJECTION: NORMAL
REJECTED TEST: NORMAL
SARS-COV-2, NAAT: NOT DETECTED
SODIUM BLD-SCNC: 137 MMOL/L (ref 132–146)
SODIUM BLD-SCNC: 137 MMOL/L (ref 132–146)
SODIUM BLD-SCNC: 138 MMOL/L (ref 132–146)
SODIUM BLD-SCNC: 140 MMOL/L (ref 132–146)
SODIUM BLD-SCNC: 141 MMOL/L (ref 132–146)
SODIUM BLD-SCNC: 144 MMOL/L (ref 132–146)
SODIUM BLD-SCNC: 144 MMOL/L (ref 132–146)
SODIUM BLD-SCNC: 145 MMOL/L (ref 132–146)
SODIUM BLD-SCNC: 146 MMOL/L (ref 132–146)
SODIUM BLD-SCNC: 147 MMOL/L (ref 132–146)
SODIUM BLD-SCNC: 148 MMOL/L (ref 132–146)
SODIUM URINE: 72 MMOL/L
TOTAL PROTEIN: 5.1 G/DL (ref 6.4–8.3)
TOTAL PROTEIN: 5.8 G/DL (ref 6.4–8.3)
TOTAL PROTEIN: 6.2 G/DL (ref 6.4–8.3)
TOTAL PROTEIN: 6.3 G/DL (ref 6.4–8.3)
TOTAL PROTEIN: 6.3 G/DL (ref 6.4–8.3)
TOTAL PROTEIN: 6.4 G/DL (ref 6.4–8.3)
TOTAL PROTEIN: 6.4 G/DL (ref 6.4–8.3)
TOTAL PROTEIN: 6.5 G/DL (ref 6.4–8.3)
TOTAL PROTEIN: 6.8 G/DL (ref 6.4–8.3)
TOTAL PROTEIN: 6.9 G/DL (ref 6.4–8.3)
TOTAL PROTEIN: 7.1 G/DL (ref 6.4–8.3)
TOTAL PROTEIN: 7.5 G/DL (ref 6.4–8.3)
TOTAL PROTEIN: 7.5 G/DL (ref 6.4–8.3)
TOTAL PROTEIN: 7.8 G/DL (ref 6.4–8.3)
VITAMIN D 1,25-DIHYDROXY: 39 PG/ML (ref 19.9–79.3)
VITAMIN D 25-HYDROXY: 57 NG/ML (ref 30–100)
WBC # BLD: 9.3 E9/L (ref 4.5–11.5)
WBC # BLD: 9.7 E9/L (ref 4.5–11.5)
WBC # BLD: 9.9 E9/L (ref 4.5–11.5)

## 2022-01-01 PROCEDURE — 83735 ASSAY OF MAGNESIUM: CPT

## 2022-01-01 PROCEDURE — 6360000004 HC RX CONTRAST MEDICATION: Performed by: RADIOLOGY

## 2022-01-01 PROCEDURE — 36415 COLL VENOUS BLD VENIPUNCTURE: CPT

## 2022-01-01 PROCEDURE — 84300 ASSAY OF URINE SODIUM: CPT

## 2022-01-01 PROCEDURE — 82962 GLUCOSE BLOOD TEST: CPT

## 2022-01-01 PROCEDURE — 6370000000 HC RX 637 (ALT 250 FOR IP): Performed by: INTERNAL MEDICINE

## 2022-01-01 PROCEDURE — 80053 COMPREHEN METABOLIC PANEL: CPT

## 2022-01-01 PROCEDURE — 2580000003 HC RX 258: Performed by: NURSE PRACTITIONER

## 2022-01-01 PROCEDURE — 2580000003 HC RX 258: Performed by: INTERNAL MEDICINE

## 2022-01-01 PROCEDURE — 84165 PROTEIN E-PHORESIS SERUM: CPT

## 2022-01-01 PROCEDURE — 6360000002 HC RX W HCPCS: Performed by: EMERGENCY MEDICINE

## 2022-01-01 PROCEDURE — 85025 COMPLETE CBC W/AUTO DIFF WBC: CPT

## 2022-01-01 PROCEDURE — 71260 CT THORAX DX C+: CPT

## 2022-01-01 PROCEDURE — 85610 PROTHROMBIN TIME: CPT

## 2022-01-01 PROCEDURE — 84100 ASSAY OF PHOSPHORUS: CPT

## 2022-01-01 PROCEDURE — 6370000000 HC RX 637 (ALT 250 FOR IP): Performed by: NURSE PRACTITIONER

## 2022-01-01 PROCEDURE — 82570 ASSAY OF URINE CREATININE: CPT

## 2022-01-01 PROCEDURE — 82436 ASSAY OF URINE CHLORIDE: CPT

## 2022-01-01 PROCEDURE — 82330 ASSAY OF CALCIUM: CPT

## 2022-01-01 PROCEDURE — 71046 X-RAY EXAM CHEST 2 VIEWS: CPT

## 2022-01-01 PROCEDURE — 1200000000 HC SEMI PRIVATE

## 2022-01-01 PROCEDURE — 3430000000 HC RX DIAGNOSTIC RADIOPHARMACEUTICAL: Performed by: RADIOLOGY

## 2022-01-01 PROCEDURE — 82652 VIT D 1 25-DIHYDROXY: CPT

## 2022-01-01 PROCEDURE — 96361 HYDRATE IV INFUSION ADD-ON: CPT

## 2022-01-01 PROCEDURE — 97530 THERAPEUTIC ACTIVITIES: CPT

## 2022-01-01 PROCEDURE — 84156 ASSAY OF PROTEIN URINE: CPT

## 2022-01-01 PROCEDURE — 6360000002 HC RX W HCPCS: Performed by: INTERNAL MEDICINE

## 2022-01-01 PROCEDURE — 2500000003 HC RX 250 WO HCPCS: Performed by: INTERNAL MEDICINE

## 2022-01-01 PROCEDURE — 78306 BONE IMAGING WHOLE BODY: CPT

## 2022-01-01 PROCEDURE — 96360 HYDRATION IV INFUSION INIT: CPT

## 2022-01-01 PROCEDURE — 99285 EMERGENCY DEPT VISIT HI MDM: CPT

## 2022-01-01 PROCEDURE — 93005 ELECTROCARDIOGRAM TRACING: CPT | Performed by: EMERGENCY MEDICINE

## 2022-01-01 PROCEDURE — 2580000003 HC RX 258: Performed by: EMERGENCY MEDICINE

## 2022-01-01 PROCEDURE — 97165 OT EVAL LOW COMPLEX 30 MIN: CPT

## 2022-01-01 PROCEDURE — 87635 SARS-COV-2 COVID-19 AMP PRB: CPT

## 2022-01-01 PROCEDURE — 85027 COMPLETE CBC AUTOMATED: CPT

## 2022-01-01 PROCEDURE — 83935 ASSAY OF URINE OSMOLALITY: CPT

## 2022-01-01 PROCEDURE — 82306 VITAMIN D 25 HYDROXY: CPT

## 2022-01-01 PROCEDURE — 74177 CT ABD & PELVIS W/CONTRAST: CPT

## 2022-01-01 PROCEDURE — A9503 TC99M MEDRONATE: HCPCS | Performed by: RADIOLOGY

## 2022-01-01 PROCEDURE — 83970 ASSAY OF PARATHORMONE: CPT

## 2022-01-01 PROCEDURE — 97535 SELF CARE MNGMENT TRAINING: CPT

## 2022-01-01 PROCEDURE — 97161 PT EVAL LOW COMPLEX 20 MIN: CPT

## 2022-01-01 PROCEDURE — 6360000002 HC RX W HCPCS: Performed by: FAMILY MEDICINE

## 2022-01-01 PROCEDURE — 86334 IMMUNOFIX E-PHORESIS SERUM: CPT

## 2022-01-01 RX ORDER — HYDRALAZINE HYDROCHLORIDE 50 MG/1
100 TABLET, FILM COATED ORAL EVERY 8 HOURS SCHEDULED
Status: DISCONTINUED | OUTPATIENT
Start: 2022-01-01 | End: 2022-01-01

## 2022-01-01 RX ORDER — LOSARTAN POTASSIUM 50 MG/1
50 TABLET ORAL 2 TIMES DAILY
Status: DISCONTINUED | OUTPATIENT
Start: 2022-01-01 | End: 2022-01-01 | Stop reason: HOSPADM

## 2022-01-01 RX ORDER — LANOLIN ALCOHOL/MO/W.PET/CERES
1000 CREAM (GRAM) TOPICAL DAILY
Status: DISCONTINUED | OUTPATIENT
Start: 2022-01-01 | End: 2022-01-01 | Stop reason: HOSPADM

## 2022-01-01 RX ORDER — MEMANTINE HYDROCHLORIDE 10 MG/1
10 TABLET ORAL 2 TIMES DAILY
Status: DISCONTINUED | OUTPATIENT
Start: 2022-01-01 | End: 2022-01-01 | Stop reason: HOSPADM

## 2022-01-01 RX ORDER — SAW/PYGEUM/BETA/HERB/D3/B6/ZN 30 MG-25MG
10 CAPSULE ORAL NIGHTLY
COMMUNITY

## 2022-01-01 RX ORDER — LOSARTAN POTASSIUM 50 MG/1
50 TABLET ORAL 2 TIMES DAILY
COMMUNITY

## 2022-01-01 RX ORDER — FENOFIBRATE 160 MG/1
160 TABLET ORAL DAILY
Status: DISCONTINUED | OUTPATIENT
Start: 2022-01-01 | End: 2022-01-01

## 2022-01-01 RX ORDER — 0.9 % SODIUM CHLORIDE 0.9 %
500 INTRAVENOUS SOLUTION INTRAVENOUS ONCE
Status: COMPLETED | OUTPATIENT
Start: 2022-01-01 | End: 2022-01-01

## 2022-01-01 RX ORDER — DEXTROSE AND SODIUM CHLORIDE 5; .45 G/100ML; G/100ML
INJECTION, SOLUTION INTRAVENOUS CONTINUOUS
Status: DISCONTINUED | OUTPATIENT
Start: 2022-01-01 | End: 2022-01-01

## 2022-01-01 RX ORDER — SODIUM CHLORIDE 9 MG/ML
INJECTION, SOLUTION INTRAVENOUS ONCE
Status: COMPLETED | OUTPATIENT
Start: 2022-01-01 | End: 2022-01-01

## 2022-01-01 RX ORDER — INSULIN LISPRO 100 [IU]/ML
0-3 INJECTION, SOLUTION INTRAVENOUS; SUBCUTANEOUS NIGHTLY
Status: DISCONTINUED | OUTPATIENT
Start: 2022-01-01 | End: 2022-01-01 | Stop reason: HOSPADM

## 2022-01-01 RX ORDER — POTASSIUM CHLORIDE 20 MEQ/1
40 TABLET, EXTENDED RELEASE ORAL ONCE
Status: COMPLETED | OUTPATIENT
Start: 2022-01-01 | End: 2022-01-01

## 2022-01-01 RX ORDER — WARFARIN SODIUM 2.5 MG/1
2.5 TABLET ORAL DAILY
Status: DISCONTINUED | OUTPATIENT
Start: 2022-01-01 | End: 2022-01-01

## 2022-01-01 RX ORDER — SODIUM CHLORIDE 9 MG/ML
INJECTION, SOLUTION INTRAVENOUS PRN
Status: DISCONTINUED | OUTPATIENT
Start: 2022-01-01 | End: 2022-01-01 | Stop reason: HOSPADM

## 2022-01-01 RX ORDER — ONDANSETRON 2 MG/ML
4 INJECTION INTRAMUSCULAR; INTRAVENOUS EVERY 6 HOURS PRN
Status: DISCONTINUED | OUTPATIENT
Start: 2022-01-01 | End: 2022-01-01 | Stop reason: HOSPADM

## 2022-01-01 RX ORDER — CALCITONIN SALMON 200 [USP'U]/ML
300 INJECTION, SOLUTION INTRAMUSCULAR; SUBCUTANEOUS EVERY 12 HOURS
Status: COMPLETED | OUTPATIENT
Start: 2022-01-01 | End: 2022-01-01

## 2022-01-01 RX ORDER — DONEPEZIL HYDROCHLORIDE 10 MG/1
10 TABLET, FILM COATED ORAL DAILY
Status: DISCONTINUED | OUTPATIENT
Start: 2022-01-01 | End: 2022-01-01

## 2022-01-01 RX ORDER — MIRTAZAPINE 15 MG/1
7.5 TABLET, FILM COATED ORAL NIGHTLY
Status: DISCONTINUED | OUTPATIENT
Start: 2022-01-01 | End: 2022-01-01

## 2022-01-01 RX ORDER — SODIUM CHLORIDE 0.9 % (FLUSH) 0.9 %
5-40 SYRINGE (ML) INJECTION PRN
Status: DISCONTINUED | OUTPATIENT
Start: 2022-01-01 | End: 2022-01-01 | Stop reason: HOSPADM

## 2022-01-01 RX ORDER — ROSUVASTATIN CALCIUM 20 MG/1
20 TABLET, COATED ORAL NIGHTLY
COMMUNITY

## 2022-01-01 RX ORDER — INSULIN LISPRO 100 [IU]/ML
0-6 INJECTION, SOLUTION INTRAVENOUS; SUBCUTANEOUS
Status: DISCONTINUED | OUTPATIENT
Start: 2022-01-01 | End: 2022-01-01 | Stop reason: HOSPADM

## 2022-01-01 RX ORDER — ONDANSETRON 4 MG/1
4 TABLET, ORALLY DISINTEGRATING ORAL EVERY 8 HOURS PRN
Status: DISCONTINUED | OUTPATIENT
Start: 2022-01-01 | End: 2022-01-01 | Stop reason: HOSPADM

## 2022-01-01 RX ORDER — WARFARIN SODIUM 2 MG/1
2 TABLET ORAL DAILY
Status: DISCONTINUED | OUTPATIENT
Start: 2022-01-01 | End: 2022-01-01

## 2022-01-01 RX ORDER — ROSUVASTATIN CALCIUM 20 MG/1
20 TABLET, COATED ORAL NIGHTLY
Status: DISCONTINUED | OUTPATIENT
Start: 2022-01-01 | End: 2022-01-01 | Stop reason: HOSPADM

## 2022-01-01 RX ORDER — TC 99M MEDRONATE 20 MG/10ML
25 INJECTION, POWDER, LYOPHILIZED, FOR SOLUTION INTRAVENOUS
Status: COMPLETED | OUTPATIENT
Start: 2022-01-01 | End: 2022-01-01

## 2022-01-01 RX ORDER — SODIUM CHLORIDE 0.9 % (FLUSH) 0.9 %
5-40 SYRINGE (ML) INJECTION EVERY 12 HOURS SCHEDULED
Status: DISCONTINUED | OUTPATIENT
Start: 2022-01-01 | End: 2022-01-01 | Stop reason: HOSPADM

## 2022-01-01 RX ORDER — SODIUM CHLORIDE 9 MG/ML
INJECTION, SOLUTION INTRAVENOUS CONTINUOUS
Status: DISCONTINUED | OUTPATIENT
Start: 2022-01-01 | End: 2022-01-01

## 2022-01-01 RX ORDER — DEXTROSE MONOHYDRATE 50 MG/ML
100 INJECTION, SOLUTION INTRAVENOUS PRN
Status: DISCONTINUED | OUTPATIENT
Start: 2022-01-01 | End: 2022-01-01 | Stop reason: HOSPADM

## 2022-01-01 RX ORDER — SENNOSIDES 8.6 MG
650 CAPSULE ORAL 3 TIMES DAILY
COMMUNITY

## 2022-01-01 RX ORDER — HYDRALAZINE HYDROCHLORIDE 20 MG/ML
5 INJECTION INTRAMUSCULAR; INTRAVENOUS ONCE
Status: COMPLETED | OUTPATIENT
Start: 2022-01-01 | End: 2022-01-01

## 2022-01-01 RX ORDER — PREDNISOLONE ACETATE 10 MG/ML
1 SUSPENSION/ DROPS OPHTHALMIC 4 TIMES DAILY
Status: DISCONTINUED | OUTPATIENT
Start: 2022-01-01 | End: 2022-01-01

## 2022-01-01 RX ORDER — POTASSIUM CHLORIDE 20 MEQ/1
20 TABLET, EXTENDED RELEASE ORAL DAILY
Status: DISCONTINUED | OUTPATIENT
Start: 2022-01-01 | End: 2022-01-01 | Stop reason: HOSPADM

## 2022-01-01 RX ORDER — TRIAMCINOLONE ACETONIDE 1 MG/G
CREAM TOPICAL 2 TIMES DAILY
Status: DISCONTINUED | OUTPATIENT
Start: 2022-01-01 | End: 2022-01-01 | Stop reason: HOSPADM

## 2022-01-01 RX ADMIN — WARFARIN SODIUM 2.5 MG: 2.5 TABLET ORAL at 15:51

## 2022-01-01 RX ADMIN — INSULIN LISPRO 2 UNITS: 100 INJECTION, SOLUTION INTRAVENOUS; SUBCUTANEOUS at 12:08

## 2022-01-01 RX ADMIN — PAMIDRONATE DISODIUM 60 MG: 3 INJECTION INTRAVENOUS at 12:21

## 2022-01-01 RX ADMIN — LOSARTAN POTASSIUM 50 MG: 50 TABLET, FILM COATED ORAL at 20:09

## 2022-01-01 RX ADMIN — ROSUVASTATIN CALCIUM 20 MG: 20 TABLET, FILM COATED ORAL at 21:07

## 2022-01-01 RX ADMIN — Medication 10 ML: at 21:09

## 2022-01-01 RX ADMIN — CYANOCOBALAMIN TAB 1000 MCG 1000 MCG: 1000 TAB at 07:45

## 2022-01-01 RX ADMIN — CALCITONIN SALMON 300 UNITS: 200 INJECTION, SOLUTION INTRAMUSCULAR; SUBCUTANEOUS at 20:31

## 2022-01-01 RX ADMIN — CYANOCOBALAMIN TAB 1000 MCG 1000 MCG: 1000 TAB at 07:55

## 2022-01-01 RX ADMIN — CYANOCOBALAMIN TAB 1000 MCG 1000 MCG: 1000 TAB at 09:23

## 2022-01-01 RX ADMIN — TRIAMCINOLONE ACETONIDE: 1 CREAM TOPICAL at 07:53

## 2022-01-01 RX ADMIN — TRIAMCINOLONE ACETONIDE: 1 CREAM TOPICAL at 07:56

## 2022-01-01 RX ADMIN — Medication 10 ML: at 07:54

## 2022-01-01 RX ADMIN — LOSARTAN POTASSIUM 50 MG: 50 TABLET, FILM COATED ORAL at 07:54

## 2022-01-01 RX ADMIN — CYANOCOBALAMIN TAB 1000 MCG 1000 MCG: 1000 TAB at 08:32

## 2022-01-01 RX ADMIN — INSULIN LISPRO 1 UNITS: 100 INJECTION, SOLUTION INTRAVENOUS; SUBCUTANEOUS at 06:30

## 2022-01-01 RX ADMIN — CALCITONIN SALMON 300 UNITS: 200 INJECTION, SOLUTION INTRAMUSCULAR; SUBCUTANEOUS at 19:55

## 2022-01-01 RX ADMIN — LOSARTAN POTASSIUM 50 MG: 50 TABLET, FILM COATED ORAL at 09:23

## 2022-01-01 RX ADMIN — CYANOCOBALAMIN TAB 1000 MCG 1000 MCG: 1000 TAB at 07:54

## 2022-01-01 RX ADMIN — TRIAMCINOLONE ACETONIDE: 1 CREAM TOPICAL at 21:07

## 2022-01-01 RX ADMIN — TRIAMCINOLONE ACETONIDE: 1 CREAM TOPICAL at 08:35

## 2022-01-01 RX ADMIN — IOPAMIDOL 75 ML: 755 INJECTION, SOLUTION INTRAVENOUS at 14:06

## 2022-01-01 RX ADMIN — POTASSIUM & SODIUM PHOSPHATES POWDER PACK 280-160-250 MG 250 MG: 280-160-250 PACK at 15:51

## 2022-01-01 RX ADMIN — POTASSIUM CHLORIDE 20 MEQ: 1500 TABLET, EXTENDED RELEASE ORAL at 07:54

## 2022-01-01 RX ADMIN — MEMANTINE HYDROCHLORIDE 10 MG: 10 TABLET, FILM COATED ORAL at 10:22

## 2022-01-01 RX ADMIN — CYANOCOBALAMIN TAB 1000 MCG 1000 MCG: 1000 TAB at 10:22

## 2022-01-01 RX ADMIN — POTASSIUM CHLORIDE 40 MEQ: 1500 TABLET, EXTENDED RELEASE ORAL at 22:55

## 2022-01-01 RX ADMIN — MEMANTINE HYDROCHLORIDE 10 MG: 10 TABLET, FILM COATED ORAL at 00:00

## 2022-01-01 RX ADMIN — CALCITONIN SALMON 300 UNITS: 200 INJECTION, SOLUTION INTRAMUSCULAR; SUBCUTANEOUS at 07:55

## 2022-01-01 RX ADMIN — TRIAMCINOLONE ACETONIDE: 1 CREAM TOPICAL at 10:23

## 2022-01-01 RX ADMIN — MEMANTINE HYDROCHLORIDE 10 MG: 10 TABLET, FILM COATED ORAL at 07:54

## 2022-01-01 RX ADMIN — POTASSIUM & SODIUM PHOSPHATES POWDER PACK 280-160-250 MG 250 MG: 280-160-250 PACK at 07:45

## 2022-01-01 RX ADMIN — TRIAMCINOLONE ACETONIDE: 1 CREAM TOPICAL at 20:04

## 2022-01-01 RX ADMIN — LOSARTAN POTASSIUM 50 MG: 50 TABLET, FILM COATED ORAL at 20:31

## 2022-01-01 RX ADMIN — ROSUVASTATIN CALCIUM 20 MG: 20 TABLET, FILM COATED ORAL at 20:31

## 2022-01-01 RX ADMIN — TRIAMCINOLONE ACETONIDE: 1 CREAM TOPICAL at 20:09

## 2022-01-01 RX ADMIN — POTASSIUM PHOSPHATE, MONOBASIC AND POTASSIUM PHOSPHATE, DIBASIC 30 MMOL: 224; 236 INJECTION, SOLUTION, CONCENTRATE INTRAVENOUS at 12:19

## 2022-01-01 RX ADMIN — INSULIN LISPRO 1 UNITS: 100 INJECTION, SOLUTION INTRAVENOUS; SUBCUTANEOUS at 16:19

## 2022-01-01 RX ADMIN — DEXTROSE AND SODIUM CHLORIDE: 5; 450 INJECTION, SOLUTION INTRAVENOUS at 11:12

## 2022-01-01 RX ADMIN — Medication 10 ML: at 07:57

## 2022-01-01 RX ADMIN — POTASSIUM & SODIUM PHOSPHATES POWDER PACK 280-160-250 MG 250 MG: 280-160-250 PACK at 20:31

## 2022-01-01 RX ADMIN — INSULIN LISPRO 3 UNITS: 100 INJECTION, SOLUTION INTRAVENOUS; SUBCUTANEOUS at 12:19

## 2022-01-01 RX ADMIN — POTASSIUM CHLORIDE 20 MEQ: 1500 TABLET, EXTENDED RELEASE ORAL at 07:45

## 2022-01-01 RX ADMIN — Medication 10 ML: at 10:24

## 2022-01-01 RX ADMIN — MEMANTINE HYDROCHLORIDE 10 MG: 10 TABLET, FILM COATED ORAL at 21:13

## 2022-01-01 RX ADMIN — INSULIN LISPRO 1 UNITS: 100 INJECTION, SOLUTION INTRAVENOUS; SUBCUTANEOUS at 16:40

## 2022-01-01 RX ADMIN — POTASSIUM CHLORIDE 40 MEQ: 1500 TABLET, EXTENDED RELEASE ORAL at 10:41

## 2022-01-01 RX ADMIN — INSULIN LISPRO 1 UNITS: 100 INJECTION, SOLUTION INTRAVENOUS; SUBCUTANEOUS at 11:11

## 2022-01-01 RX ADMIN — Medication 10 ML: at 21:04

## 2022-01-01 RX ADMIN — POTASSIUM CHLORIDE 20 MEQ: 1500 TABLET, EXTENDED RELEASE ORAL at 10:22

## 2022-01-01 RX ADMIN — MEMANTINE HYDROCHLORIDE 10 MG: 10 TABLET, FILM COATED ORAL at 21:07

## 2022-01-01 RX ADMIN — ROSUVASTATIN CALCIUM 20 MG: 20 TABLET, FILM COATED ORAL at 00:46

## 2022-01-01 RX ADMIN — POTASSIUM CHLORIDE 20 MEQ: 1500 TABLET, EXTENDED RELEASE ORAL at 09:40

## 2022-01-01 RX ADMIN — INSULIN LISPRO 1 UNITS: 100 INJECTION, SOLUTION INTRAVENOUS; SUBCUTANEOUS at 21:14

## 2022-01-01 RX ADMIN — SODIUM CHLORIDE: 9 INJECTION, SOLUTION INTRAVENOUS at 20:23

## 2022-01-01 RX ADMIN — DEXTROSE AND SODIUM CHLORIDE: 5; 450 INJECTION, SOLUTION INTRAVENOUS at 10:07

## 2022-01-01 RX ADMIN — MEMANTINE HYDROCHLORIDE 10 MG: 10 TABLET, FILM COATED ORAL at 20:31

## 2022-01-01 RX ADMIN — MEMANTINE HYDROCHLORIDE 10 MG: 10 TABLET, FILM COATED ORAL at 07:45

## 2022-01-01 RX ADMIN — TRIAMCINOLONE ACETONIDE: 1 CREAM TOPICAL at 07:52

## 2022-01-01 RX ADMIN — TRIAMCINOLONE ACETONIDE: 1 CREAM TOPICAL at 20:26

## 2022-01-01 RX ADMIN — TRIAMCINOLONE ACETONIDE: 1 CREAM TOPICAL at 21:13

## 2022-01-01 RX ADMIN — TC 99M MEDRONATE 25 MILLICURIE: 20 INJECTION, POWDER, LYOPHILIZED, FOR SOLUTION INTRAVENOUS at 11:30

## 2022-01-01 RX ADMIN — POTASSIUM CHLORIDE 20 MEQ: 1500 TABLET, EXTENDED RELEASE ORAL at 08:33

## 2022-01-01 RX ADMIN — LOSARTAN POTASSIUM 50 MG: 50 TABLET, FILM COATED ORAL at 10:22

## 2022-01-01 RX ADMIN — TRIAMCINOLONE ACETONIDE: 1 CREAM TOPICAL at 09:23

## 2022-01-01 RX ADMIN — DEXTROSE AND SODIUM CHLORIDE: 5; 450 INJECTION, SOLUTION INTRAVENOUS at 02:56

## 2022-01-01 RX ADMIN — INSULIN LISPRO 1 UNITS: 100 INJECTION, SOLUTION INTRAVENOUS; SUBCUTANEOUS at 06:12

## 2022-01-01 RX ADMIN — SODIUM CHLORIDE 500 ML: 9 INJECTION, SOLUTION INTRAVENOUS at 17:18

## 2022-01-01 RX ADMIN — Medication 10 ML: at 00:01

## 2022-01-01 RX ADMIN — CALCITONIN SALMON 300 UNITS: 200 INJECTION, SOLUTION INTRAMUSCULAR; SUBCUTANEOUS at 07:45

## 2022-01-01 RX ADMIN — MEMANTINE HYDROCHLORIDE 10 MG: 10 TABLET, FILM COATED ORAL at 20:09

## 2022-01-01 RX ADMIN — ROSUVASTATIN CALCIUM 20 MG: 20 TABLET, FILM COATED ORAL at 21:09

## 2022-01-01 RX ADMIN — MEMANTINE HYDROCHLORIDE 10 MG: 10 TABLET, FILM COATED ORAL at 09:23

## 2022-01-01 RX ADMIN — POTASSIUM CHLORIDE 20 MEQ: 1500 TABLET, EXTENDED RELEASE ORAL at 07:55

## 2022-01-01 RX ADMIN — MEMANTINE HYDROCHLORIDE 10 MG: 10 TABLET, FILM COATED ORAL at 20:04

## 2022-01-01 RX ADMIN — POTASSIUM CHLORIDE 20 MEQ: 1500 TABLET, EXTENDED RELEASE ORAL at 09:23

## 2022-01-01 RX ADMIN — LOSARTAN POTASSIUM 50 MG: 50 TABLET, FILM COATED ORAL at 20:04

## 2022-01-01 RX ADMIN — POTASSIUM & SODIUM PHOSPHATES POWDER PACK 280-160-250 MG 250 MG: 280-160-250 PACK at 12:18

## 2022-01-01 RX ADMIN — HYDRALAZINE HYDROCHLORIDE 5 MG: 20 INJECTION INTRAMUSCULAR; INTRAVENOUS at 09:18

## 2022-01-01 RX ADMIN — ROSUVASTATIN CALCIUM 20 MG: 20 TABLET, FILM COATED ORAL at 20:04

## 2022-01-01 RX ADMIN — INSULIN LISPRO 1 UNITS: 100 INJECTION, SOLUTION INTRAVENOUS; SUBCUTANEOUS at 16:37

## 2022-01-01 RX ADMIN — INSULIN LISPRO 1 UNITS: 100 INJECTION, SOLUTION INTRAVENOUS; SUBCUTANEOUS at 20:11

## 2022-01-01 RX ADMIN — ROSUVASTATIN CALCIUM 20 MG: 20 TABLET, FILM COATED ORAL at 20:09

## 2022-01-01 RX ADMIN — SODIUM CHLORIDE: 9 INJECTION, SOLUTION INTRAVENOUS at 07:00

## 2022-01-01 RX ADMIN — SODIUM CHLORIDE: 9 INJECTION, SOLUTION INTRAVENOUS at 06:01

## 2022-01-01 RX ADMIN — MEMANTINE HYDROCHLORIDE 10 MG: 10 TABLET, FILM COATED ORAL at 08:33

## 2022-01-01 ASSESSMENT — PAIN - FUNCTIONAL ASSESSMENT: PAIN_FUNCTIONAL_ASSESSMENT: 0-10

## 2022-01-01 ASSESSMENT — ENCOUNTER SYMPTOMS
BACK PAIN: 0
SHORTNESS OF BREATH: 0
COUGH: 0
ABDOMINAL PAIN: 0

## 2022-01-01 ASSESSMENT — PAIN SCALES - GENERAL
PAINLEVEL_OUTOF10: 0

## 2022-05-27 PROBLEM — I50.9 CHRONIC CHF (CONGESTIVE HEART FAILURE) (HCC): Status: ACTIVE | Noted: 2022-01-01

## 2022-05-27 PROBLEM — E83.52 HYPERCALCEMIA: Status: ACTIVE | Noted: 2022-01-01

## 2022-05-27 NOTE — Clinical Note
Discharge Plan[de-identified] Other/[facility])   Telemetry/Cardiac Monitoring Required?: No

## 2022-05-28 PROBLEM — N18.9 CKD (CHRONIC KIDNEY DISEASE): Status: ACTIVE | Noted: 2022-01-01

## 2022-05-28 NOTE — PROGRESS NOTES
Spoke to daughter/poa, she would like the doctor to call her for any question and to let her know the plan of care. Daughter stated the pt had dementia and will not be able to answer any of the questions. Daughter stated pt has lost 20-30lbs in 12 days, and The facility lost his dentures and have not been feeding him, she believes.

## 2022-05-28 NOTE — ED PROVIDER NOTES
This is an 42-year-old male with a past medical history of pulmonary embolism as well as hyperlipidemia presents to the ED for evaluation of an abnormal lab. Patient is here with her daughter who is helping provide history as the patient has some memory issues. Apparently over the past several weeks to months the patient has been recently fatigued and has been losing a significant mount of weight over 20 pounds. Patient has no energy to do much and apparently about a week ago when he was out to dinner had a hard time even grasping anything. Patient is on Coumadin for pulmonary emboli and has been compliant with his medications. No new medications. No reported cough shortness of breath or fevers. No known history of cancer. No other reported mitigating or exacerbating factors. A complete review of systems and history is limited second to the patient's dementia    The history is provided by the patient. Review of Systems   Constitutional: Positive for fatigue and unexpected weight change. Negative for fever. HENT: Negative for congestion. Eyes: Negative for visual disturbance. Respiratory: Negative for cough and shortness of breath. Cardiovascular: Negative for chest pain. Gastrointestinal: Negative for abdominal pain. Endocrine: Negative for polyuria. Genitourinary: Negative for dysuria. Musculoskeletal: Negative for back pain. Allergic/Immunologic: Negative for immunocompromised state. Neurological: Negative for headaches. Hematological: Bruises/bleeds easily. Psychiatric/Behavioral: Negative for confusion. Physical Exam  Vitals and nursing note reviewed. Constitutional:       General: He is not in acute distress. Appearance: He is well-developed. HENT:      Head: Normocephalic and atraumatic. Mouth/Throat:      Mouth: Mucous membranes are dry. Eyes:      Extraocular Movements: Extraocular movements intact.       Pupils: Pupils are equal, round, and reactive to light. Neck:      Vascular: No JVD. Cardiovascular:      Rate and Rhythm: Regular rhythm. Bradycardia present. Pulmonary:      Effort: Pulmonary effort is normal.   Abdominal:      General: There is no distension. Palpations: Abdomen is soft. Tenderness: There is no abdominal tenderness. There is no guarding or rebound. Hernia: No hernia is present. Musculoskeletal:      Cervical back: Normal range of motion and neck supple. Right lower leg: No edema. Left lower leg: No edema. Skin:     General: Skin is warm and dry. Capillary Refill: Capillary refill takes less than 2 seconds. Neurological:      General: No focal deficit present. Mental Status: He is alert. Cranial Nerves: No cranial nerve deficit. Psychiatric:         Mood and Affect: Mood normal.         Behavior: Behavior normal.          Procedures     MDM  Number of Diagnoses or Management Options  Hypercalcemia  Diagnosis management comments: This is a pleasant 79-year-old male who presented to the ED for abnormal calcium patient was slightly bradycardic but had no chest pain or shortness of breath spoke at length with the daughter as well as son-in-law patient was found to have a critically high calcium I spoke at length with Dr. Goivanni Trujillo as he had no previous nephrologist's chart. We did speak at length he did recommend at this point time IV fluids started a normal saline infusion as well as starting calcitonin for about 4 doses of IV every 12 hours again it was well documented the patient was DNR CC he was in no distress was appropriate for general med floor given this woke with April who was agreeable plan. ED Course as of 05/27/22 2006   Fri May 27, 2022   1949 Spoke with Dr. Giovanni Trujillo, agreed with NS infusion and recommended starting Calitonin at 4 units per KG for q12 hours for 4 doses.  Spoke with pharmacy ok with 300 units [BP]      ED Course User Index  [BP] Ray Mcguire DO ED Course as of 05/27/22 2006   Fri May 27, 2022   1949 Spoke with Dr. Ld Mckenna, agreed with NS infusion and recommended starting Calitonin at 4 units per KG for q12 hours for 4 doses. Spoke with pharmacy ok with 300 units [BP]      ED Course User Index  [BP] Gillian Alicia DO       --------------------------------------------- PAST HISTORY ---------------------------------------------  Past Medical History:  has a past medical history of Hyperlipidemia and Pericarditis. Past Surgical History:  has a past surgical history that includes Vasectomy and joint replacement. Social History:  reports that he quit smoking about 32 years ago. He started smoking about 50 years ago. He has a 45.00 pack-year smoking history. He has never used smokeless tobacco. He reports previous alcohol use. He reports that he does not use drugs. Family History: family history is not on file. The patients home medications have been reviewed.     Allergies: No known allergies    -------------------------------------------------- RESULTS -------------------------------------------------    LABS:  Results for orders placed or performed during the hospital encounter of 05/27/22   Comprehensive Metabolic Panel w/ Reflex to MG   Result Value Ref Range    Sodium 138 132 - 146 mmol/L    Potassium reflex Magnesium 4.6 3.5 - 5.0 mmol/L    Chloride 102 98 - 107 mmol/L    CO2 24 22 - 29 mmol/L    Anion Gap 12 7 - 16 mmol/L    Glucose 84 74 - 99 mg/dL    BUN 23 6 - 23 mg/dL    CREATININE 1.3 (H) 0.7 - 1.2 mg/dL    GFR Non-African American 52 >=60 mL/min/1.73    GFR African American >60     Calcium 15.4 (HH) 8.6 - 10.2 mg/dL    Total Protein 7.5 6.4 - 8.3 g/dL    Albumin 3.1 (L) 3.5 - 5.2 g/dL    Total Bilirubin 0.4 0.0 - 1.2 mg/dL    Alkaline Phosphatase 102 40 - 129 U/L    ALT 28 0 - 40 U/L    AST 51 (H) 0 - 39 U/L   CBC with Auto Differential   Result Value Ref Range    WBC 9.7 4.5 - 11.5 E9/L    RBC 4.85 3.80 - 5.80 E12/L    Hemoglobin improving  Repeat physical examination is improved    Counseling:  I have spoken with the patient and discussed todays results, in addition to providing specific details for the plan of care and counseling regarding the diagnosis and prognosis. Their questions are answered at this time and they are agreeable with the plan of admission.    --------------------------------- ADDITIONAL PROVIDER NOTES ---------------------------------  Consultations:  Spoke with April Discussed case. They will admit the patient. This patient's ED course included: a personal history and physicial examination, re-evaluation prior to disposition, multiple bedside re-evaluations, IV medications, cardiac monitoring, continuous pulse oximetry and complex medical decision making and emergency management    This patient has remained hemodynamically stable during their ED course. Please note that the withdrawal or failure to initiate urgent interventions for this patient would likely result in a life threatening deterioration or permanent disability. Systems at risk for deterioration include: CV/Neuro    Accordingly this patient received 32 minutes of critical care time, excluding separately billable procedures. Diagnosis:  1. Hypercalcemia        Disposition:  Patient's disposition: Admit to med/surg floor  Patient's condition is fair.         Ana Rust DO  05/29/22 1646

## 2022-05-28 NOTE — CONSULTS
[facility] MD Orlando Elizabeth MD Leonora Mooring, MD Rolo Vasquez, DWIGHT Rutledge  Consultation  Patient's Name: Diane Carl  12:35 PM  5/28/2022    Nephrologist: Orlando Gregg MD    Reason for Consult: Hypercalcemia  Requesting Physician:  Jose F Olmedo MD    Chief Complaint: Weakness, mental status change    History Obtained From: Patient, chart    History of Present Ilness:    Mr. Bishop Capone is a 80-year-old gentleman presented to the emergency department last evening with progressive fatigue and malaise, acute mental status change. He has dementia at baseline, though current mental status is worse than his usual.  History is mostly per my discussion with the emergency room resident we discussed with his daughter, though he does answer some questions. He has had persistent anorexia for at least a month, though exacerbated by his ECF having lost his teeth, inability to feed himself properly and not being fed by the staff at the nursing home. Daughter believes he is lost 20 to 30 pounds in the last several weeks. No nausea or vomiting. No diarrhea or known constipation. No fever or chill. No lightheadedness. He has had some generalized weakness and this has been getting worse. No shortness of breath, cough wheeze or sputum production. No chest pain or palpitations. Denies peripheral swelling. No abdominal pain, diarrhea or constipation, melena hematochezia, dysuria hematuria change in bowel bladder habits. No new skin rash or lesion. He has no idea whether he has started new medications of late. Serum calcium presentation was 15.4 mmol/L, compares with a calcium of 9.2 mmol/L on 11/1/2021. Number of follow-up laboratory studies have been ordered including intact PTH, PTH RP, vitamin D, though none have been drawn due to some sort of \"mixup\" with the laboratory per bedside RN.   He had received 1 dose of calcitonin by the time his repeat calcium was drawn at 3 AM.  Has been treated with IV normal saline initially at 125 cc an hour, increased to 150 cc an hour. He has refused to allow further laboratory draws, though after my discussion with him, he acquiesces. BUN and creatinine presentation were 23 and 1.3, respectively, compared with 22 and 1.6, respectively as of 11/1/2021, 0.9 mg/dL 9/2021. Albumin 3.1. Total protein 7.5. Hemoglobin 13.2    Past Medical History:   Diagnosis Date    Hyperlipidemia     Pericarditis        Past Surgical History:   Procedure Laterality Date    JOINT REPLACEMENT      VASECTOMY         History reviewed. No pertinent family history. reports that he quit smoking about 32 years ago. He started smoking about 50 years ago. He has a 45.00 pack-year smoking history. He has never used smokeless tobacco. He reports previous alcohol use. He reports that he does not use drugs.     Allergies:  No known allergies    Current Medications:    warfarin (COUMADIN) tablet 2.5 mg, Daily  losartan (COZAAR) tablet 50 mg, BID  rosuvastatin (CRESTOR) tablet 20 mg, Nightly  triamcinolone (KENALOG) 0.1 % cream, BID  0.9 % sodium chloride infusion, Continuous  calcitonin (MIACALCIN) injection 300 Units, Q12H  sodium chloride flush 0.9 % injection 5-40 mL, 2 times per day  sodium chloride flush 0.9 % injection 5-40 mL, PRN  0.9 % sodium chloride infusion, PRN  ondansetron (ZOFRAN-ODT) disintegrating tablet 4 mg, Q8H PRN   Or  ondansetron (ZOFRAN) injection 4 mg, Q6H PRN  bisacodyl (DULCOLAX) EC tablet 5 mg, Daily PRN  glucose chewable tablet 16 g, PRN  dextrose bolus 10% 125 mL, PRN   Or  dextrose bolus 10% 250 mL, PRN  glucagon (rDNA) injection 1 mg, PRN  dextrose 5 % solution, PRN  insulin lispro (HUMALOG) injection vial 0-6 Units, TID WC  insulin lispro (HUMALOG) injection vial 0-3 Units, Nightly  memantine (NAMENDA) tablet 10 mg, BID  potassium chloride (KLOR-CON M) extended release tablet 20 mEq, Daily  vitamin B-12 (CYANOCOBALAMIN) tablet 1,000 mcg, Daily        Review of Systems:   Pertinent items are noted in HPI. Otherwise unremarkable or unobtainable    Physical exam:   /61   Pulse 54   Temp 97.9 °F (36.6 °C) (Axillary)   Resp 18   Ht 5' 8\" (1.727 m)   Wt 173 lb 4.5 oz (78.6 kg)   SpO2 93%   BMI 26.35 kg/m²   Age-appropriate elderly white gentleman in no apparent distress. NC/AT EOMI sclera conjunctiva clear and anicteric mucous membranes are moist  Neck soft supple trachea midline. No bruit. Would not cooperate for JVD evaluation  CTA bilaterally  Regular rhythm normal S1 and S2 no murmur  Abdomen soft nontender nondistended normoactive bowel sounds  Distal extremities are without edema  Pulses 1+ x4  No rash or lesion on visible portions of integument  Moves all 4 extremities  Cranial nerves II through XII grossly intact exam somewhat limited by lack of cooperation  Awake, alert, interactive.   Oriented to self, place, though not clinical scenario or time      Data:   Labs:  CBC with Differential:    Lab Results   Component Value Date    WBC 9.3 05/28/2022    RBC 4.69 05/28/2022    HGB 12.5 05/28/2022    HCT 40.4 05/28/2022     05/28/2022    MCV 86.1 05/28/2022    MCH 26.7 05/28/2022    MCHC 30.9 05/28/2022    RDW 16.1 05/28/2022    LYMPHOPCT 15.2 05/28/2022    MONOPCT 9.9 05/28/2022    BASOPCT 0.8 05/28/2022    MONOSABS 0.92 05/28/2022    LYMPHSABS 1.41 05/28/2022    EOSABS 0.10 05/28/2022    BASOSABS 0.07 05/28/2022     CMP:    Lab Results   Component Value Date     05/28/2022    K 3.5 05/28/2022     05/28/2022    CO2 24 05/28/2022    BUN 22 05/28/2022    CREATININE 1.2 05/28/2022    GFRAA >60 05/28/2022    LABGLOM 57 05/28/2022    GLUCOSE 108 05/28/2022    PROT 6.3 05/28/2022    LABALBU 2.7 05/28/2022    CALCIUM 13.3 05/28/2022    BILITOT 0.4 05/28/2022    ALKPHOS 97 05/28/2022    AST 38 05/28/2022    ALT 24 05/28/2022     Ionized Calcium:  No results found for: IONCA  Magnesium:    Lab Results   Component Value Date    MG 2.3 05/28/2022     Phosphorus:    Lab Results   Component Value Date    PHOS 2.8 05/27/2022     U/A:  No results found for: NITRITE, COLORU, PHUR, LABCAST, WBCUA, RBCUA, MUCUS, TRICHOMONAS, YEAST, BACTERIA, CLARITYU, SPECGRAV, LEUKOCYTESUR, UROBILINOGEN, BILIRUBINUR, BLOODU, GLUCOSEU, AMORPHOUS  Microalbumen/Creatinine ratio:  No components found for: RUCREAT  Iron Saturation:  No components found for: PERCENTFE  TIBC:  No results found for: TIBC  FERRITIN:  No results found for: FERRITIN     Imaging:  No orders to display       Assessment  1. Hypercalcemia. Given the severity, recent history of weight loss (is not known if he has other \"Btype\" symptoms) malignancy represents the likeliest etiology, though in the absence of other symptoms besides the weight loss, the differential for what type of malignancy remains broad. Intact PTH and other laboratory studies ordered were apparently sent to the lab, though not run, and the samples cannot be obtained. He is refused further lab draws for now. With normal hemoglobin, doubt multiple myeloma  2. ETHAN, mild this point, likely secondary to combination of prerenal azotemia due to poor intake exacerbated by the hypercalcemia itself    Recommendations  1. Check intact PTH  2. We will plan on checking PTH RP, SPEP, UPEP, 25-hydroxy vitamin D3, 1, 25 dihydroxy vitamin D3  3. Pending those results, further evaluation to follow  4. Continue IV normal saline, though will increase the rate  5. Continue calcitonin, plan for 4 doses total, if improvement in calcium level stalls, will double the dose for the last 2 doses  6. Discussed with him regarding allowing us to draw labs and perform other diagnostic studies. He agrees to allow us to do so  7. Continue supportive care  8. Encourage oral intake as able  9.  Follow labs, UO      Thank you for the opportunity to participate in the care of your pleasant patient. We look forward to following along with you.         Electronically signed by Mindi Christensen MD on 5/28/2022 at 12:35 PM

## 2022-05-28 NOTE — H&P
[facility]  History and Physical      CHIEF COMPLAINT:    Chief Complaint   Patient presents with    Abnormal Lab        Patient of Yao Keane MD presents with:  Hypercalcemia    History of Present Illness:   Patient is an 70-year-old male with past medical history of hyperlipidemia, PE and pericarditis. Patient presented to the ED at the advice of his PCP for an abnormal labs. Patient has been experiencing fatigue and has been losing a significant amount of weight over 20 pounds over the past couple months. Patient does not have any energy to do much and apparently about a week ago he was out to dinner had a hard time even grasping for anything. Patient takes Coumadin for his PE and has been compliant with all medications. Patient is alert and oriented on examination. Patient denies any chest pain, shortness of breath, fever, chills, headache, dizziness, and blurred vision. ER work-up revealed elevated calcium level at 13.3, INR 4.0. Patient is admitted for further testing and treatment. REVIEW OF SYSTEMS:  Pertinent negatives are above in HPI. 10 point ROS otherwise negative.       Past Medical History:   Diagnosis Date    Hyperlipidemia     Pericarditis          Past Surgical History:   Procedure Laterality Date    JOINT REPLACEMENT      VASECTOMY         Medications Prior to Admission:    Medications Prior to Admission: Melatonin ER 10 MG TBCR, Take 10 mEq by mouth at bedtime  rosuvastatin (CRESTOR) 20 MG tablet, Take 20 mg by mouth at bedtime  losartan (COZAAR) 50 mg tablet, Take 50 mg by mouth 2 times daily  acetaminophen (TYLENOL) 650 MG extended release tablet, Take 650 mg by mouth in the morning, at noon, and at bedtime  warfarin (COUMADIN) 2 MG tablet, Take 2.5 mg by mouth daily   [DISCONTINUED] mirtazapine (REMERON) 15 MG tablet, Take 0.5 tablets by mouth nightly (Patient not taking: Reported on 5/27/2022)  [DISCONTINUED] glimepiride (AMARYL) 4 MG tablet, Take 4 mg by mouth 2 times daily (Patient not taking: Reported on 5/27/2022)  [DISCONTINUED] prednisoLONE acetate (PRED FORTE) 1 % ophthalmic suspension, Place 1 drop into the left eye 4 times daily  [DISCONTINUED] ciclopirox (PENLAC) 8 % solution, Apply topically nightly Apply topically nightly. Affected nails  [DISCONTINUED] lisinopril (PRINIVIL;ZESTRIL) 40 MG tablet, Take 40 mg by mouth daily (Patient not taking: Reported on 5/27/2022)  [DISCONTINUED] empagliflozin (JARDIANCE) 10 MG tablet, Take 10 mg by mouth daily (Patient not taking: Reported on 5/27/2022)  acetaminophen (TYLENOL) 325 MG tablet, Take 2 tablets by mouth every 6 hours as needed for Pain (Patient taking differently: Take 325 mg by mouth every 4 hours as needed for Pain or Fever (Temp >100) Not to exceed 6 doses/24 hours)  bumetanide (BUMEX) 1 MG tablet, Take 1 tablet by mouth daily  potassium chloride (KLOR-CON M) 20 MEQ extended release tablet, Take 1 tablet by mouth daily (Patient taking differently: Take 10 mEq by mouth daily )  [DISCONTINUED] hydrALAZINE (APRESOLINE) 100 MG tablet, Take 1 tablet by mouth every 8 hours  triamcinolone (KENALOG) 0.1 % cream, Apply topically 2 times daily Apply topically to bilateral forearms 2 times daily.   Apoaequorin (PREVAGEN EXTRA STRENGTH) 20 MG CAPS, Take 1 capsule by mouth daily  [DISCONTINUED] donepezil (ARICEPT) 10 MG tablet, Take 1 tablet by mouth daily (Patient not taking: Reported on 5/27/2022)  magnesium hydroxide (MILK OF MAGNESIA) 400 MG/5ML suspension, Take 30 mLs by mouth daily as needed for Constipation  ARTIFICIAL TEAR OP, Place 2 drops into the right eye 3 times daily   vitamin B-12 (CYANOCOBALAMIN) 1000 MCG tablet, Take 1,000 mcg by mouth daily   memantine (NAMENDA) 10 MG tablet, Take 10 mg by mouth 2 times daily  [DISCONTINUED] fenofibrate (TRICOR) 145 MG tablet, Take 145 mg by mouth daily    Note that the patient's home medications were reviewed and the above list is accurate to the best of my knowledge at the time of the exam.    Allergies:    No known allergies    Social History:    reports that he quit smoking about 32 years ago. He started smoking about 50 years ago. He has a 45.00 pack-year smoking history. He has never used smokeless tobacco. He reports previous alcohol use. He reports that he does not use drugs. Family History:   Unable to obtain at this time      PHYSICAL EXAM:    Vitals:  /61   Pulse 54   Temp 97.9 °F (36.6 °C) (Axillary)   Resp 18   Ht 5' 8\" (1.727 m)   Wt 173 lb 4.5 oz (78.6 kg)   SpO2 93%   BMI 26.35 kg/m²       General appearance: NAD, conversant  Eyes: Sclerae anicteric, PERRLA  HEENT: AT/NC, MMM  Neck: FROM, supple, no thyromegaly  Lymph: No cervical / supraclavicular lymphadenopathy  Lungs: Clear to auscultation, WOB normal  CV: Irregular, no MRGs, bilateral lower extremity edema  Abdomen: Soft, non-tender; no masses or HSM, +BS  Extremities: FROM without synovitis. No clubbing or cyanosis of the hands. Bilateral lower extremity edema  Skin: no rash, induration, lesions, or ulcers  Psych: Calm and cooperative. Normal judgement and insight. Normal mood and affect. Neuro: Alert and interactive, face symmetric, speech fluent. LABS:  All labs reviewed.   Of note:  CBC with Differential:    Lab Results   Component Value Date    WBC 9.3 05/28/2022    RBC 4.69 05/28/2022    HGB 12.5 05/28/2022    HCT 40.4 05/28/2022     05/28/2022    MCV 86.1 05/28/2022    MCH 26.7 05/28/2022    MCHC 30.9 05/28/2022    RDW 16.1 05/28/2022    LYMPHOPCT 15.2 05/28/2022    MONOPCT 9.9 05/28/2022    BASOPCT 0.8 05/28/2022    MONOSABS 0.92 05/28/2022    LYMPHSABS 1.41 05/28/2022    EOSABS 0.10 05/28/2022    BASOSABS 0.07 05/28/2022     CMP:    Lab Results   Component Value Date     05/28/2022    K 3.6 05/28/2022     05/28/2022    CO2 23 05/28/2022    BUN 20 05/28/2022    CREATININE 1.2 05/28/2022    GFRAA >60 05/28/2022    LABGLOM 57 05/28/2022    GLUCOSE 96 05/28/2022    PROT 6.4 05/28/2022    LABALBU 2.5 05/28/2022    CALCIUM 12.5 05/28/2022    BILITOT 0.4 05/28/2022    ALKPHOS 96 05/28/2022    AST 36 05/28/2022    ALT 22 05/28/2022       EKG:  I've personally reviewed the patient's EKG:  Idioventricular    Telemetry:  I've personally reviewed the patient's telemetry:      ASSESSMENT/PLAN:  Principal Problem:    Hypercalcemia  Active Problems:    Chronic CHF (congestive heart failure) (HCC)    CKD (chronic kidney disease)    History of pulmonary embolism    Chronic anticoagulation    Dementia (HCC)    Type 2 diabetes mellitus, without long-term current use of insulin ([facility] [address]    Hyperlipidemia  Resolved Problems:    * No resolved hospital problems. *    80-year-old male with a significant past medical history of CKD, dementia, DM is admitted to telemetry unit with    Hypercalcemia  Monitor labs calcium ion 1.88/serum calcium 12.5  IV hydrationnormal saline at 100  Nephrology following  Strict I's and O's  Calcitonin x4 doses    Diabetes Mellitus  -Monitor labs  -ISS glucose control  -Hypoglycemia protocol initiated  -Diabetes education  -Discuss diet modification     Medication for other comorbidities continue as appropriate dose adjustment as necessary. DVT prophylaxis  PT OT  Discharge planning  Case discussed with attending and agreed upon plan of care. Code status: Full  Requires inpatient level of care  Olesya, ADALBERTO - CNP    1:55 PM  5/28/2022     Above note edited to reflect my thoughts   Patient resting comfortably in no apparent acute distress  Not sure he has insight as to his current medical condition  Indicates he feels well and does not have any further questions regarding why his calcium is markedly elevated  Possibility of underlying malignancy is entertained in this 80-year-old man    I personally saw, examined and provided care for the patient. Radiographs, labs and medication list were reviewed by me independently.   The case was

## 2022-05-29 PROBLEM — E43 SEVERE PROTEIN-CALORIE MALNUTRITION (HCC): Status: ACTIVE | Noted: 2022-01-01

## 2022-05-29 NOTE — PROGRESS NOTES
Results of CT AP called to Dr Ezequiel West (who ordered) and he will update Dr Jean Gutierrez. No further orders at this time.

## 2022-05-29 NOTE — PROGRESS NOTES
Spoke to Dr Alla Mcdonough after she seen pt in room. Nursing addressed  speaking to daughter/POA. Dr Alla Mcdonough said she will reach out to [facility].

## 2022-05-29 NOTE — PROGRESS NOTES
Comprehensive Nutrition Assessment    Type and Reason for Visit:  Initial,Positive Nutrition Screen    Nutrition Recommendations/Plan:   1. Continue current diet as tolerated  2. Will add Glucerna TID (260 mg Ca per bottle)  3. Recommend SLP evaluation r/t severe malnutrition, dementia, ill-fitting dentures, poor PO intake     Malnutrition Assessment:  Malnutrition Status:  Severe malnutrition (05/29/22 1230)    Context:  Chronic Illness     Findings of the 6 clinical characteristics of malnutrition:  Energy Intake:  75% or less estimated energy requirements for 1 month or longer  Weight Loss:  Greater than 10% over 6 months (-21.9% X 6 months, note fluid shifts may contribute to wt change)     Body Fat Loss:  Severe body fat loss     Muscle Mass Loss:  Severe muscle mass loss    Fluid Accumulation:  No significant fluid accumulation     Strength:  Not Performed    Nutrition Assessment:    NH pt w/ hypercalcemia, note ETHAN. Hx DM/CHF/CKD/dementia. Note per pt's family poor PO intake w/ recent significant wt loss. Pt w/ severe malnutrition, will add ONS & continue to monitor. Nutrition Related Findings:    A&O X3, dementia, ill-fitting dentures, I&Os WDL, abd WDL, Na 148 Wound Type: None       Current Nutrition Intake & Therapies:    Average Meal Intake: 1-25% (does better w/ fluids, picks at food per nursing)  Average Supplements Intake: None Ordered  ADULT DIET; Regular; Low Fat/Low Chol/High Fiber/2 gm Na; Low Sodium (2 gm)    Anthropometric Measures:  Height: 5' 8\" (172.7 cm)  Ideal Body Weight (IBW): 154 lbs (70 kg)    Admission Body Weight: 174 lb (78.9 kg) (5/27 bed)  Current Body Weight: 171 lb (77.6 kg) (5/29 bed), 111 % IBW. Weight Source: Bed Scale  Current BMI (kg/m2): 26  Usual Body Weight: 242 lb (109.8 kg) (8/2021 actual per EMR, 219# X 6 months)  % Weight Change (Calculated): -29.3  Weight Adjustment For: No Adjustment                 BMI Categories: Overweight (BMI 25.0-29. 9)    Estimated Daily Nutrient Needs:  Energy Requirements Based On: Formula  Weight Used for Energy Requirements: Current  Energy (kcal/day):   Weight Used for Protein Requirements: Ideal  Protein (g/day):  (1.2-1.4)  Method Used for Fluid Requirements: 1 ml/kcal  Fluid (ml/day):     Nutrition Diagnosis:   · Severe malnutrition,In context of chronic illness related to cognitive or neurological impairment as evidenced by Criteria as identified in malnutrition assessment    Nutrition Interventions:   Food and/or Nutrient Delivery: Continue Current Diet,Start Oral Nutrition Supplement  Nutrition Education/Counseling: No recommendation at this time  Coordination of Nutrition Care: Continue to monitor while inpatient       Goals:     Goals: PO intake 50% or greater       Nutrition Monitoring and Evaluation:      Food/Nutrient Intake Outcomes: Food and Nutrient Intake,Supplement Intake  Physical Signs/Symptoms Outcomes: Biochemical Data,GI Status,Fluid Status or Edema,Nutrition Focused Physical Findings,Skin,Weight    Discharge Planning:     Too soon to determine     Jl Alexander RD, LD  Contact: [phone]

## 2022-05-29 NOTE — PROGRESS NOTES
Perfect served [facility] [employer], April NP is covering, nursing asked if she can reach out to Daughter Josesito/BAILEY, April stated she is unfamiliar with the pt but will reach out to Juanpablo Mcbride to have her call the daughter.

## 2022-05-29 NOTE — PROGRESS NOTES
Spoke to daughter/POA about plan of care, she wants the doctor to give her a call and is very upset that no doctor has called her. Nursing answered all of the daughters questions but she still wants to speak to the doctor.

## 2022-05-29 NOTE — PROGRESS NOTES
Call placed to daughter Gabriel Deal - [phone], to discuss findings of ct and bone scan and code status   No answer   Detailed VM message left to indicate we will reach out again in am

## 2022-05-29 NOTE — PROGRESS NOTES
[facility]                                Progress note    Subjective: The patient is resting on exam yet easily arousable  No acute events overnight. Denies chest pain, angina, SOB     Objective:    BP (!) 141/73   Pulse (!) 44   Temp 97.7 °F (36.5 °C) (Axillary)   Resp 18   Ht 5' 8\" (1.727 m)   Wt 171 lb 11.8 oz (77.9 kg)   SpO2 96%   BMI 26.11 kg/m²     No intake/output data recorded. No intake/output data recorded. General appearance: NAD, conversant  HEENT: AT/NC, MMM  Neck: FROM, supple  Lungs: Clear to auscultation  CV: RRR, no MRGs  Vasc: Radial pulses 2+  Abdomen: Soft, non-tender; no masses or HSM  Extremities: No peripheral edema or digital cyanosis, bilateral lower extremity edema  Skin: no rash, lesions or ulcers  Psych: Alert and oriented to person, place and time  Neuro: Alert and interactive     Recent Labs     05/27/22  1654 05/28/22  0258 05/29/22  0259   WBC 9.7 9.3 9.9   HGB 13.2 12.5 13.0   HCT 42.5 40.4 42.7    305 332       Recent Labs     05/28/22  2329 05/28/22  2329 05/29/22  0259 05/29/22  1024     --  146  144 148*   K 3.7   < > 3.3*  3.6 3.6   *  --  112*  111* 113*   CO2 21*  --  21*  21* 22   BUN 20  --  20  20 20   CREATININE 1.1  --  1.1  1.1 1.1   CALCIUM 12.9*  --  12.8*  12.7* 13.0*    < > = values in this interval not displayed. Assessment:    Principal Problem:    Hypercalcemia  Active Problems:    Chronic CHF (congestive heart failure) (HCC)    CKD (chronic kidney disease)    Severe protein-calorie malnutrition (HCC)    History of pulmonary embolism    Chronic anticoagulation    Dementia (HCC)    Type 2 diabetes mellitus, without long-term current use of insulin ([facility] [address]    Hyperlipidemia  Resolved Problems:    * No resolved hospital problems.  *      Plan:  77-year-old male with a significant past medical history of CKD, dementia, DM is admitted to telemetry unit with     Hypercalcemia  Monitor labs calcium ion 1.88/serum calcium 12.5>13.0  IV hydrationnormal saline at 200  Nephrology following  Strict I's and O's  Calcitonin x4 doses  Bone scanpending     Diabetes Mellitus  -Monitor labs  -ISS glucose control  -Hypoglycemia protocol initiated  -Diabetes education  -Discuss diet modification     DVT Prophylaxis   PT/OT  Discharge planning     ADALBERTO Barry - AASHISH  1:00 PM  5/29/2022     Above note edited to reflect my thoughts   Ct a/p and chest reviewed along w bone scan   Pt appers to have a malignancy and mediastinal la  on ct chest - likely etiology of hypercalcemia   Also with metastatic dx to the bones   Code status is DNR CC  I attempted to dw pt but he is quite confused   Will dw daughter     I personally saw, examined and provided care for the patient. Radiographs, labs and medication list were reviewed by me independently. The case was discussed in detail and plans for care were established. Review of Tala Marquis APRN- CNP, documentation was conducted and revisions were made as appropriate directly by me. I agree with the above documented exam, problem list, and plan of care.      Leana Mckeon MD  6:40 PM  5/29/2022

## 2022-05-29 NOTE — PROGRESS NOTES
Spoke to lab again about the special tube we need for the pts one blood test, yesterday lab ran out and had to have down town bring some up, today lab stated they still do not have any. Lab is putting in another request. Nursing asked if there is a time line, lab stated usually they have a 2 to 4 hour window.

## 2022-05-29 NOTE — PROGRESS NOTES
[facility] MD Jesu Faulkner MD Ada Aris, MD Marvine Ruse, MD Viktoria Morris, AASHISH Rodriguez, DWIGHT  Progress Note    5/29/2022    SUBJECTIVE:   5/29: More awake and alert, interactive and appropriate today. Cooperative. Constipated having difficulty moving his bowels while sitting currently on the commode. Denies dyspnea at rest on room air. No pain. No cough wheeze or sputum production. No chest pain or palpitations. No peripheral swelling. Appetite still poor but he did eat breakfast    PROBLEM LIST:    Principal Problem:    Hypercalcemia  Active Problems:    Chronic CHF (congestive heart failure) (HCC)    CKD (chronic kidney disease)    Severe protein-calorie malnutrition (HCC)    History of pulmonary embolism    Chronic anticoagulation    Dementia ([facility])    Type 2 diabetes mellitus, without long-term current use of insulin ([facility] [address]    Hyperlipidemia  Resolved Problems:    * No resolved hospital problems. *         DIET:    ADULT DIET; Regular; Low Fat/Low Chol/High Fiber/2 gm Na;  Low Sodium (2 gm)  ADULT ORAL NUTRITION SUPPLEMENT; Breakfast, Lunch, Dinner; Diabetic Oral Supplement     MEDS (scheduled):    pamidronate (AREDIA) IVPB  60 mg IntraVENous Once    losartan  50 mg Oral BID    rosuvastatin  20 mg Oral Nightly    triamcinolone   Topical BID    sodium chloride flush  5-40 mL IntraVENous 2 times per day    insulin lispro  0-6 Units SubCUTAneous TID WC    insulin lispro  0-3 Units SubCUTAneous Nightly    memantine  10 mg Oral BID    potassium chloride  20 mEq Oral Daily    vitamin B-12  1,000 mcg Oral Daily       MEDS (infusions):   sodium chloride 200 mL/hr at 05/28/22 1453    sodium chloride      dextrose         MEDS (prn):  sodium chloride flush, sodium chloride, ondansetron **OR** ondansetron, bisacodyl, glucose, dextrose bolus **OR** dextrose bolus, glucagon (rDNA), dextrose    PHYSICAL EXAM:     Patient Vitals for the past 24 hrs:   BP Temp Temp src Pulse Resp SpO2 Height Weight   05/29/22 1221       5' 8\" (1.727 m)    05/29/22 0730 (!) 141/73 97.7 °F (36.5 °C) Axillary (!) 44 18 96 %     05/29/22 0106        171 lb 11.8 oz (77.9 kg)   05/28/22 2014 131/68 97.8 °F (36.6 °C) Axillary 55 16 97 %     @    No intake or output data in the 24 hours ending 05/29/22 1418      Wt Readings from Last 3 Encounters:   05/29/22 171 lb 11.8 oz (77.9 kg)   11/03/21 219 lb 8 oz (99.6 kg)   10/19/21 220 lb 9.6 oz (100.1 kg)       Constitutional:  in no acute distress  HEENT: NC/AT, EOMI, sclera and conjunctiva are clear and anicteric, mucus membranes moist  Neck: Trachea midline, no JVD  Cardiovascular: S1, S2 regular rhythm, no murmur,or rub  Respiratory:  No crackles, no wheeze  Gastrointestinal:  Soft, nontender, nondistended, NABS  Ext: no edema, feet warm  Skin: dry, no rash  Neuro: awake, alert, interactive      DATA:    Recent Labs     05/27/22  1654 05/28/22 0258 05/29/22  0259   WBC 9.7 9.3 9.9   HGB 13.2 12.5 13.0   HCT 42.5 40.4 42.7   MCV 87.6 86.1 87.5    305 332     Recent Labs     05/27/22  1654 05/27/22  1654 05/28/22  0258 05/28/22  0258 05/28/22  1232 05/28/22  1232 05/28/22  2329 05/28/22  2329 05/29/22  0259 05/29/22  1024      < > 141   < > 140   < > 146  --  146  144 148*   K 4.6   < > 3.5   < > 3.6   < > 3.7   < > 3.3*  3.6 3.6      < > 107   < > 108*   < > 112*  --  112*  111* 113*   CO2 24   < > 24   < > 23   < > 21*  --  21*  21* 22   MG 2.5  --  2.3  --   --   --   --   --  2.1  --    PHOS 2.8  --   --   --  2.2*  --   --   --  2.3*  --    BUN 23   < > 22   < > 20   < > 20  --  20  20 20   CREATININE 1.3*   < > 1.2   < > 1.2   < > 1.1  --  1.1  1.1 1.1   ALT 28   < > 24   < > 22   < > 23  --  22  21 20   AST 51*   < > 38   < > 36   < > 41*  --  41*  39 43*   BILITOT 0.4   < > 0.4   < > 0.4   < > 1.3*  --  0.5  0.4 0.4   ALKPHOS 102   < > 97   < > 96   < > 106  --  105  103 106 < > = values in this interval not displayed. No results found for: LABPROT    Assessment  1. Hypercalcemia. Given the severity, recent history of weight loss (is not known if he has other \"Btype\" symptoms) malignancy represents the likeliest etiology, though in the absence of other symptoms besides the weight loss, the differential for what type of malignancy remains broad. Intact PTH and other laboratory studies ordered were apparently sent to the lab, though not run, and the samples cannot be obtained. He is refused further lab draws for now. With normal hemoglobin, doubt multiple myeloma    2. ETHAN, mild this point, likely secondary to combination of prerenal azotemia due to poor intake exacerbated by the hypercalcemia itself     Azotemia improving with IV fluid  Calcium improved, though stalled  Intact PTH reflects suppressed parathyroid hormone, due to the hypercalcemia, and rules out primary hyperparathyroidism  Limits he remains a likeliest etiology    Recommendations  1. Await PTH RP, SPEP, UPEP, 25-hydroxy vitamin D3, 1, 25 dihydroxy vitamin D3  2. CT chest abdomen pelvis  3. Bone scan  4. Continue IV normal saline, though will decrease the rate somewhat  5. Continue calcitonin, plan for 4 doses total, if improvement in calcium level stalls, will double the dose for the last 2 doses  6. Pamidronate 60 mg IV over 4 hours  7. Continue supportive care  8.  Encourage oral intake         Electronically signed by Lisa Hatfield MD on 5/29/2022

## 2022-05-30 NOTE — PLAN OF CARE

## 2022-05-30 NOTE — PROGRESS NOTES
[facility] MD Consuelo Faulkner, MD Josy Miguel MD Viktoria Morris, AASHISH Rodriguez, DWIGHT  Progress Note    5/30/2022    SUBJECTIVE:   5/29: More awake and alert, interactive and appropriate today. Cooperative. Constipated having difficulty moving his bowels while sitting currently on the commode. Denies dyspnea at rest on room air. No pain. No cough wheeze or sputum production. No chest pain or palpitations. No peripheral swelling. Appetite still poor but he did eat breakfast  5/30:  No changes. Denies chest pain or SOB. Denies fever, chills or cough. Appetite is still sluggish. PROBLEM LIST:    Principal Problem:    Hypercalcemia  Active Problems:    Chronic CHF (congestive heart failure) (HCC)    CKD (chronic kidney disease)    Severe protein-calorie malnutrition (HCC)    History of pulmonary embolism    Chronic anticoagulation    Dementia (HCC)    Type 2 diabetes mellitus, without long-term current use of insulin ([facility] [address]    Hyperlipidemia  Resolved Problems:    * No resolved hospital problems. *         DIET:    ADULT DIET; Regular; Low Fat/Low Chol/High Fiber/2 gm Na;  Low Sodium (2 gm)  ADULT ORAL NUTRITION SUPPLEMENT; Breakfast, Lunch, Dinner; Diabetic Oral Supplement     MEDS (scheduled):    losartan  50 mg Oral BID    rosuvastatin  20 mg Oral Nightly    triamcinolone   Topical BID    sodium chloride flush  5-40 mL IntraVENous 2 times per day    insulin lispro  0-6 Units SubCUTAneous TID WC    insulin lispro  0-3 Units SubCUTAneous Nightly    memantine  10 mg Oral BID    potassium chloride  20 mEq Oral Daily    vitamin B-12  1,000 mcg Oral Daily       MEDS (infusions):   dextrose 5 % and 0.45 % NaCl 150 mL/hr at 05/30/22 1112    sodium chloride      dextrose         MEDS (prn):  sodium chloride flush, sodium chloride, ondansetron **OR** ondansetron, bisacodyl, glucose, dextrose bolus **OR** dextrose bolus, glucagon (rDNA), dextrose    PHYSICAL EXAM:     Patient Vitals for the past 24 hrs:   BP Temp Temp src Pulse Resp SpO2 Height Weight   05/30/22 0815 (!) 183/88 97.7 °F (36.5 °C) Oral 56 20 93 %     05/30/22 0056        166 lb 7.2 oz (75.5 kg)   05/29/22 2012 (!) 166/64 97.4 °F (36.3 °C) Oral (!) 45 18 98 %     05/29/22 1221       5' 8\" (1.727 m)    @      Intake/Output Summary (Last 24 hours) at 5/30/2022 1136  Last data filed at 5/30/2022 0603  Gross per 24 hour   Intake 2111.07 ml   Output 300 ml   Net 1811.07 ml         Wt Readings from Last 3 Encounters:   05/30/22 166 lb 7.2 oz (75.5 kg)   11/03/21 219 lb 8 oz (99.6 kg)   10/19/21 220 lb 9.6 oz (100.1 kg)       Constitutional:  in no acute distress  HEENT: NC/AT, EOMI, sclera and conjunctiva are clear and anicteric, mucus membranes moist  Neck: Trachea midline, no JVD  Cardiovascular: S1, S2 regular rhythm, no murmur,or rub  Respiratory:  CTAB.   No crackles, no wheeze  Gastrointestinal:  Soft, nontender, nondistended, NABS  Ext: no edema, feet warm  Skin: dry, no rash  Neuro: awake, alert, interactive      DATA:    Recent Labs     05/27/22  1654 05/28/22  0258 05/29/22  0259   WBC 9.7 9.3 9.9   HGB 13.2 12.5 13.0   HCT 42.5 40.4 42.7   MCV 87.6 86.1 87.5    305 332     Recent Labs     05/27/22  1654 05/27/22  1654 05/28/22  0258 05/28/22  0258 05/28/22  1232 05/28/22  2329 05/29/22  0259 05/29/22  0259 05/29/22  1024 05/29/22  2103 05/30/22  0302      < > 141   < > 140   < > 146  144   < > 148* 146 147*   K 4.6   < > 3.5   < > 3.6   < > 3.3*  3.6   < > 3.6 3.6 3.5      < > 107   < > 108*   < > 112*  111*   < > 113* 110* 114*   CO2 24   < > 24   < > 23   < > 21*  21*   < > 22 22 21*   MG 2.5   < > 2.3  --   --   --  2.1  --   --   --  1.9   PHOS 2.8  --   --   --  2.2*  --  2.3*  --   --   --   --    BUN 23   < > 22   < > 20   < > 20  20   < > 20 20 21   CREATININE 1.3*   < > 1.2   < > 1.2   < > 1.1  1.1   < > 1.1 1.0 1.0   ALT

## 2022-05-30 NOTE — PROGRESS NOTES
[facility]                                Progress note    Subjective: The patient is resting on exam yet easily arousable  No acute events overnight. Denies chest pain, angina, SOB     Objective:    /60   Pulse (!) 39   Temp 97.4 °F (36.3 °C) (Axillary)   Resp 18   Ht 5' 8\" (1.727 m)   Wt 166 lb 7.2 oz (75.5 kg)   SpO2 93%   BMI 25.31 kg/m²     In: 2111.1 [I.V.:2111.1]  Out: 300   In: 2111.1   Out: 300 [Urine:300]    General appearance: NAD, conversant  HEENT: AT/NC, MMM  Neck: FROM, supple  Lungs: Clear to auscultation  CV: RRR, no MRGs  Vasc: Radial pulses 2+  Abdomen: Soft, non-tender; no masses or HSM  Extremities: No peripheral edema or digital cyanosis, bilateral lower extremity edema  Skin: no rash, lesions or ulcers  Psych: Alert and oriented to person, place and time  Neuro: Alert and interactive     Recent Labs     05/27/22  1654 05/28/22  0258 05/29/22  0259   WBC 9.7 9.3 9.9   HGB 13.2 12.5 13.0   HCT 42.5 40.4 42.7    305 332       Recent Labs     05/29/22  2103 05/30/22  0302 05/30/22  1100    147* 145   K 3.6 3.5 4.7   * 114* 116*   CO2 22 21* 18*   BUN 20 21 20   CREATININE 1.0 1.0 1.0   CALCIUM 13.2* 12.5* 12.2*       Assessment:    Principal Problem:    Hypercalcemia  Active Problems:    Chronic CHF (congestive heart failure) (HCC)    CKD (chronic kidney disease)    Severe protein-calorie malnutrition (HCC)    History of pulmonary embolism    Chronic anticoagulation    Dementia (HCC)    Type 2 diabetes mellitus, without long-term current use of insulin ([facility])    Hyperlipidemia  Resolved Problems:    * No resolved hospital problems.  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     Diabetes Mellitus  -Monitor labs  -ISS glucose control  -Hypoglycemia protocol initiated  -Diabetes education  -Discuss diet modification     Attempted to reach out to daughter Mikayla cabral a message will attempt again tomorrow. DVT Prophylaxis   PT/OT  Discharge planning     ADALBERTO Yoo - AASHISH  12:16 PM  5/30/2022     Above note edited to reflect my thoughts   Ct a/p and chest reviewed along w bone scan   Pt appears to have a malignancy and mediastinal la  on ct chest - likely etiology of hypercalcemia   Also with metastatic dx to the bones   Code status is DNR CC  I attempted to dw pt but he is quite confused   Called son paolo     I personally saw, examined and provided care for the patient. Radiographs, labs and medication list were reviewed by me independently. The case was discussed in detail and plans for care were established. Review of Tala Marquis APRN- CNP, documentation was conducted and revisions were made as appropriate directly by me. I agree with the above documented exam, problem list, and plan of care.      Lyly Alarcon MD

## 2022-05-31 NOTE — PROGRESS NOTES
[facility]                                Progress note    Subjective: The patient is sitting up in chair without any complaints. No acute events overnight. Denies chest pain, angina, SOB     Objective:    BP (!) 175/74   Pulse (!) 48   Temp 99.5 °F (37.5 °C) (Oral)   Resp 18   Ht 5' 8\" (1.727 m)   Wt 177 lb 11.1 oz (80.6 kg)   SpO2 97%   BMI 27.02 kg/m²     In: 3813.5 [P.O.:1200; I.V.:2613.5]  Out: -   In: 3813.5   Out: -     General appearance: NAD, conversant  HEENT: AT/NC, MMM  Neck: FROM, supple  Lungs: Clear to auscultation  CV: RRR, no MRGs  Vasc: Radial pulses 2+  Abdomen: Soft, non-tender; no masses or HSM  Extremities: No peripheral edema or digital cyanosis, bilateral lower extremity edema  Skin: no rash, lesions or ulcers  Psych: Alert and oriented to person, place and time  Neuro: Alert and interactive     Recent Labs     05/29/22  0259   WBC 9.9   HGB 13.0   HCT 42.7          Recent Labs     05/30/22  0302 05/30/22  1100 05/31/22  1011   * 145 144   K 3.5 4.7 3.8   * 116* 114*   CO2 21* 18* 19*   BUN 21 20 18   CREATININE 1.0 1.0 0.9   CALCIUM 12.5* 12.2* 11.7*       Assessment:    Principal Problem:    Hypercalcemia  Active Problems:    Chronic CHF (congestive heart failure) ([facility])    CKD (chronic kidney disease)    Severe protein-calorie malnutrition (HCC)    History of pulmonary embolism    Chronic anticoagulation    Dementia (HCC)    Type 2 diabetes mellitus, without long-term current use of insulin ([facility])    Hyperlipidemia  Resolved Problems:    * No resolved hospital problems.  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     Diabetes Mellitus  -Monitor labs  -ISS glucose control  -Hypoglycemia protocol initiated  -Diabetes education  -Discuss diet modification     Spoke with daughter Raquel Francisco  She plans to meet with Dr. Kiara Castillo at the hospital tomorrow afternoon. DVT Prophylaxis   PT/OT  Discharge planning     ADALBERTO Rivas - AASHISH  2:47 PM  5/31/2022     Above note edited to reflect my thoughts   Pt comfortable   Improved ca   primary lung ca with bony mets diffusely   Poor prognosis   Several attempts to reach family on my own have failed. Will meet tomorrow as per family communication with Tahira Marquis     I personally saw, examined and provided care for the patient. Radiographs, labs and medication list were reviewed by me independently. The case was discussed in detail and plans for care were established. Review of Tala GLOVER- CNP, documentation was conducted and revisions were made as appropriate directly by me. I agree with the above documented exam, problem list, and plan of care.      Chelsy Souza MD  8:25 PM  5/31/2022

## 2022-05-31 NOTE — PROGRESS NOTES
Physical Therapy  Facility/Department: [address] MED SURG/TELE  Physical Therapy Initial Assessment    Name: Mariaelena Sanchez  : 1934  MRN: [id number]  Date of Service: 2022      Attending Provider:  Leana Mckeon MD    Evaluating PT:  April Megan. Maurice Rob P.T. Room #:  1170/8999-N  Diagnosis:  Hypercalcemia [E83.52]  Precautions:  Falls, bed/chair alarm, [place], confused at times. SUBJECTIVE:    Pt lives at [facility]. Pt ambulated with no AD, but has a ww if needed. OBJECTIVE:   Initial Evaluation  Date: 22 Treatment Short Term/ Long Term   Goals   Was pt agreeable to Eval/treatment? yes     Does pt have pain? C/o RUE pain with some movements. Bed Mobility  Rolling: MIN A  Supine to sit: MIN A  Sit to supine: NA  Scooting: MIN A  Independent    Transfers Sit to stand: MOD A  Stand to sit: MIN A  Stand pivot: MIN A with ww  supervision   Ambulation   15 feet x2 reps with ww MIN A  150 feet with ww if needed SBA   Stair negotiation: ascended and descended NA  NA   AM-PAC 6 Clicks 95/58       BLE ROM is WFL. BLE strength is grossly 4-/5 to 4/5. Sensation:  Pt denies numbness and tingling to extremities  Edema:  None noted  Balance: sitting is SBA and standing with ww is MIN A  Endurance: fair    Patient education  Pt educated on transfers    Patient response to education:   Pt verbalized understanding Pt demonstrated skill Pt requires further education in this area   yes Yes with VCs and assist yes     ASSESSMENT:    Conditions Requiring Skilled Therapeutic Intervention:    [x]Decreased strength     []Decreased ROM  [x]Decreased functional mobility  [x]Decreased balance   [x]Decreased endurance   []Decreased posture  []Decreased sensation  []Decreased coordination   []Decreased vision  [x]Decreased safety awareness   []Increased pain   Comments:  Pt was in bed and agreeable to PT. He stood up from EOB and required MOD A. Pt asked to use BR.   He walked with slow unsteady gait with ww and MIN A for balance to  and transferred on/off commode with MIN A using grab bar. On his way to the BR he was incontinent of urine. While sitting on commode he had BM as well. He performed self hygiene care and while on commode his feet and legs were wiped with bath cloth and socks were changed. Floor was cleaned as well. Pt stood with ww at sink with MIN A while he washed his hands. He then walked to chair next to his bed and sat down. He c/o fatigue that limited further distance. Pt is a risk for falling at this time. During session at times I had to repeat directions as he kept forgetting. Treatment:  Patient practiced and was instructed in the following treatment:     Bed mobility, transfers, ADLs, and gait with ww to improve functional strength, balance, and endurance. Pt was left sitting up in chair with call light left by patient. Chair/bed alarm: chair alarm was activated. Pt's/ family goals   1. To get better and return to [facility]. Patient and or family understand(s) diagnosis, prognosis, and plan of care. PHYSICAL THERAPY PLAN OF CARE:    PT POC is established based on physician order and patient diagnosis     Referring provider/PT Order:  PT eval and treat  Diagnosis:  Hypercalcemia [E83.52]  Specific instructions for next treatment:  Increase amb distance as pt is able.     Current Treatment Recommendations:     [x] Strengthening to improve independence with functional mobility   [] ROM to improve ROM and decrease spasm and pain which will help promote independence with functional mobility   [x] Balance Training to improve static/dynamic balance and to reduce fall risk  [x] Endurance Training to improve activity tolerance during functional mobility   [x] Transfer Training to improve safety and independence with all functional transfers   [x] Gait Training to improve gait mechanics, endurance and assess need for appropriate assistive device  [x] Stair Training in preparation for safe discharge home and/or into the community   [] Positioning to prevent skin breakdown and contractures  [x] Safety and Education Training   [] Patient/Caregiver Education   [] HEP  [] Other     PT long term treatment goals are located in above grid    Frequency of treatments: 2-5x/week x 1-2 weeks. Time in  09:35  Time out  10:05    Total Treatment Time  15 minutes     Evaluation Time includes thorough review of current medical information, gathering information on past medical history/social history and prior level of function, completion of standardized testing/informal observation of tasks, assessment of data and education on plan of care and goals. CPT codes:  [x] Low Complexity PT evaluation 06922  [] Moderate Complexity PT evaluation 03201  [] High Complexity PT evaluation 10186  [] PT Re-evaluation 18322  [] Gait training 71807 ** minutes  [] Manual therapy 22895 ** minutes  [x] Therapeutic activities 15977 15 minutes  [] Therapeutic exercises 38850 ** minutes  [] Neuromuscular reeducation 76782 ** minutes     Keven Meza., P.T.   License Number: [id number]

## 2022-05-31 NOTE — PROGRESS NOTES
Occupational Therapy  OCCUPATIONAL THERAPY INITIAL EVALUATION  [facility] [address]    Date: 2022     Patient Name: Diane Carl  MRN: [id number]  : 1934  Room: [address] [place] [address]    Evaluating OT: [employer] [facility]9273    Referring Provider: ADALBERTO Huizar CNP  Specific Provider Orders/Date: \"OT eval and treat\" - 2022    Diagnosis: Hypercalcemia [E83.52]      Pertinent Medical History: hyperlipidemia     Precautions: fall risk, bed/chair alarms, hearing impairment     Assessment of Current Deficits:    [x] Functional mobility   [x]ADLs  [x] Strength               [x]Cognition   [x] Functional transfers   [x] IADLs         [x] Safety Awareness   [x]Endurance   [] Fine Coordination              [x] Balance      [] Vision/perception   [x]Sensation    []Gross Motor Coordination  [] ROM  [] Delirium                   [] Motor Control     OT PLAN OF CARE   OT POC is based on physician orders, patient diagnosis, and results of clinical assessment.   Frequency/Duration 2-5 days/week for 2 weeks PRN   Specific OT Treatment Interventions to Include:   * Instruction/training on adapted ADL techniques and AE recommendations to increase functional independence within precautions       * Training on energy conservation strategies, correct breathing pattern and techniques to improve independence/tolerance for self-care routine  * Functional transfer/mobility training/DME recommendations for increased independence, safety, and fall prevention  * Patient/Family education to increase follow through with safety techniques and functional independence  * Recommendation of environmental modifications for increased safety with functional transfers/mobility and ADLs  * Therapeutic exercise to improve motor endurance, ROM, and functional strength for ADLs/functional transfers  * Therapeutic activities to facilitate/challenge dynamic balance, stand tolerance for increased safety and independence with ADLs  * Neuro-muscular re-education: facilitation of righting/equilibrium reactions, midline orientation, scapular stability/mobility, normalization of muscle tone, and facilitation of volitional active controled movement  * Positioning to improve skin integrity, interaction with environment and functional independence    Recommended Adaptive Equipment: TBD     Home Living: Patient is a resident of an long-term. Prior Level of Function (PLOF): Patient reported that he was independent with ADLs; staff completes IADLs. patient noted that he was independent with functional mobility (with use of walker, as needed) prior to this hospitalization. Patient is a questionable historian. Pain Level: Patient denied experiencing pain. Cognition: Patient alert and oriented to person only; patient is a questionable historian. Fair+ command follow demonstrated. Decreased insight to current abilities/limitations demonstrated. Memory: Impaired  Sequencing: Fair  Problem Solving: Fair  Judgement/Safety: Fair    Functional Assessment:  AM-PAC Daily Activity Raw Score: 15/24   Initial Eval Status  Date: 5/31/2022 Treatment Status  Date:  Short Term Goals = Long Term Goals   Feeding Setup  Independent   Grooming Min A  Supervision  (seated/standing at sink)   UB Dressing Min A  Setup   LB Dressing Max A  Min A - with use of AE, as needed/appropriate   Bathing Max A  Min A - with use of AE/DME, as needed/appropriate   Toileting Max A  Min A   Bed Mobility  Not assessed. SBA in order to maximize patient's independence with ADLs, re-positioning, and other functional tasks. Functional Transfers Sit-to-Stand: Mod A   from bedside chair. Cues given to maximize safety with functional transfers. SBA   Functional Mobility Min A  (with walker) for few steps forward/backward.   Supervision with functional mobility (with device, as needed/appropriate) in order to maximize independence with ADLs/IADLs and other functional tasks. Balance Sitting: Good-  (at edge of chair)  Standing: Fair  (with walker)  Fair+ dynamic standing balance during completion of ADLs/IADLs and other functional tasks. Activity Tolerance Fair  Patient will demonstrate Good understanding and consistent implementation of energy conservation techniques and work simplification techniques into ADL routines. Visual/  Perceptual WFL     N/A     Additional Long-Term Goal: Patient will increase functional independence to PLOF in order to allow patient to live in least restrictive environment. Strength: ROM: Additional Information:    R UE  R Shoulder: 2-/5  Distal R UE: 3+/5 grossly <30 degrees of active shoulder flexion; distal AROM WFL Formal MMT not facilitated secondary to R UE pain. L UE L Shoulder: 3-/5  Distal L UE: 3+/5 grossly ~60 degrees of active shoulder flexion; distal AROM WFL    Hand Dominance: Right    Hearing: Impaired  Sensation: No complaints of numbness/tingling in B UEs. Tone: WFL  Edema: No    Comments: RN approved patient's participation in [address] [place] [address] activities. Upon arrival, patient seated in bedside chair. At end of session, patient seated in bedside chair with call light and phone within reach, chair alarm activated, and all lines and tubes intact. Patient would benefit from continued skilled OT to increase safety and independence with completion of ADL/IADL tasks for functional independence and quality of life. Rehab Potential: Good for established goals. Patient / Family Goal: No goal stated. Patient and/or family were instructed on functional diagnosis, prognosis/goals, and OT plan of care. Demonstrated Fair understanding.     Eval Complexity: Low    Time In: 1035  Time Out: 1050  Total Treatment Time: 0 minutes      Minutes Units   OT Eval Low 97036 15 1   OT Eval Medium 37641     OT Eval High 19278     OT Re-Eval [id number]     Therapeutic Ex [id number]     Therapeutic Activities 97527     ADL/Self Care 91639     Orthotic Management 82327     Neuro Re-Ed 00973     Non-Billable Time N/A ---     Evaluation time includes thorough review of current medical information, gathering information on past medical history/social history and prior level of function, completion of standardized testing/informal observation of tasks, assessment of data, and education on plan of care and goals. JOSSIE Jurado/L  License Number: [id number]

## 2022-05-31 NOTE — PLAN OF CARE
Problem: Discharge Planning  Goal: Discharge to home or other facility with appropriate resources  Outcome: Progressing     Problem: Pain  Goal: Verbalizes/displays adequate comfort level or baseline comfort level  Outcome: Progressing     Problem: Skin/Tissue Integrity  Goal: Absence of new skin breakdown  Outcome: Progressing     Problem: Safety - Adult  Goal: Free from fall injury  Outcome: Progressing     Problem: ABCDS Injury Assessment  Goal: Absence of physical injury  Outcome: Progressing     Problem: Nutrition Deficit:  Goal: Optimize nutritional status  Outcome: Progressing     Problem: Chronic Conditions and Co-morbidities  Goal: Patient's chronic conditions and co-morbidity symptoms are monitored and maintained or improved  Outcome: Progressing

## 2022-05-31 NOTE — PROGRESS NOTES
Left message on Dr Fidel Araya in regards to calling son Blanca back at [facility] 8 5/31/2022 10:18 AM

## 2022-05-31 NOTE — PLAN OF CARE
Problem: Discharge Planning  Goal: Discharge to home or other facility with appropriate resources  5/31/2022 1900 by Manda Joyce RN  Outcome: Progressing  5/31/2022 0617 by Elsa Barajas RN  Outcome: Progressing     Problem: Pain  Goal: Verbalizes/displays adequate comfort level or baseline comfort level  5/31/2022 1900 by Manda Joyce RN  Outcome: Progressing  5/31/2022 0617 by Elsa Barajas RN  Outcome: Progressing     Problem: Skin/Tissue Integrity  Goal: Absence of new skin breakdown  Description: 1. Monitor for areas of redness and/or skin breakdown  2. Assess vascular access sites hourly  3. Every 4-6 hours minimum:  Change oxygen saturation probe site  4. Every 4-6 hours:  If on nasal continuous positive airway pressure, respiratory therapy assess nares and determine need for appliance change or resting period.   5/31/2022 1900 by Manda Joyce RN  Outcome: Progressing  5/31/2022 0617 by Elsa Barajas RN  Outcome: Progressing     Problem: Safety - Adult  Goal: Free from fall injury  5/31/2022 1900 by Manda Joyce RN  Outcome: Progressing  5/31/2022 0617 by Elsa Barajas RN  Outcome: Progressing  Flowsheets (Taken 5/30/2022 2100)  Free From Fall Injury: Instruct family/caregiver on patient safety     Problem: ABCDS Injury Assessment  Goal: Absence of physical injury  5/31/2022 1900 by Manda Joyce RN  Outcome: Progressing  5/31/2022 0617 by Elsa Barajas RN  Outcome: Progressing  Flowsheets (Taken 5/30/2022 2100)  Absence of Physical Injury: Implement safety measures based on patient assessment     Problem: Nutrition Deficit:  Goal: Optimize nutritional status  5/31/2022 1900 by Manda Joyce RN  Outcome: Progressing  5/31/2022 0617 by Elsa Barajas RN  Outcome: Progressing     Problem: Chronic Conditions and Co-morbidities  Goal: Patient's chronic conditions and co-morbidity symptoms are monitored and maintained or improved  5/31/2022 1900 by Manda Joyce RN  Outcome: Progressing  5/31/2022 0617 by Idania Mendez RN  Outcome: Progressing

## 2022-06-01 NOTE — PLAN OF CARE
General Patient's chart updated to reflect:      . - HF care plan, HF education points and HF discharge instructions.  -Orders: 2 gram sodium diet, daily weights, I/O.  -PCP and/or Cardiologist appointment to be scheduled within 7 days of hospital discharge.  -History of HF, not primary admission Dx. Patient admitted for treatment of hypercalcemia.      Renee Ewing RN BSN  Heart Failure Navigator

## 2022-06-01 NOTE — CARE COORDINATION
PCP to d/w family today re; radiology results. Pt from [employer]. Daughter Romina Ross wants Mirella Copa ; orderson chart. Await outcomeof meeting. Mylene aparicio notified of [facility]. Michelle Rodrigues.

## 2022-06-01 NOTE — PROGRESS NOTES
Occupational Therapy  OT BEDSIDE TREATMENT NOTE      Date:2022  Patient Name: Mary Carrillo  MRN: [id number]  : 1934  Room: [address] [place] [address]        Evaluating OT: Jazmine [facility].[id number]     Referring Provider: ADALBERTO Huizar CNP  Specific Provider Orders/Date: \"OT eval and treat\" - 2022     Diagnosis: Hypercalcemia [E83.52]       Pertinent Medical History: hyperlipidemia      Precautions: fall risk, bed/chair alarms, hearing impairment      Assessment of Current Deficits:    [x]? Functional mobility             [x]?ADLs           [x]? Strength                  [x]? Cognition   [x]? Functional transfers           [x]? IADLs         [x]? Safety Awareness   [x]? Endurance   []? Fine Coordination              [x]? Balance      []? Vision/perception   [x]? Sensation     []? Gross Motor Coordination  []? ROM           []? Delirium                   []? Motor Control      OT PLAN OF CARE   OT POC is based on physician orders, patient diagnosis, and results of clinical assessment.   Frequency/Duration 2-5 days/week for 2 weeks PRN   Specific OT Treatment Interventions to Include:   * Instruction/training on adapted ADL techniques and AE recommendations to increase functional independence within precautions       * Training on energy conservation strategies, correct breathing pattern and techniques to improve independence/tolerance for self-care routine  * Functional transfer/mobility training/DME recommendations for increased independence, safety, and fall prevention  * Patient/Family education to increase follow through with safety techniques and functional independence  * Recommendation of environmental modifications for increased safety with functional transfers/mobility and ADLs  * Therapeutic exercise to improve motor endurance, ROM, and functional strength for ADLs/functional transfers  * Therapeutic activities to facilitate/challenge dynamic balance, stand tolerance for increased safety and independence with ADLs  * Neuro-muscular re-education: facilitation of righting/equilibrium reactions, midline orientation, scapular stability/mobility, normalization of muscle tone, and facilitation of volitional active controled movement  * Positioning to improve skin integrity, interaction with environment and functional independence     Recommended Adaptive Equipment: TBD      Home Living: Patient is a resident of an [facility].     Prior Level of Function (PLOF): Patient reported that he was independent with ADLs; staff completes IADLs. patient noted that he was independent with functional mobility (with use of walker, as needed) prior to this hospitalization. Patient is a questionable historian.     Pain Level: Patient denied experiencing pain. Cognition: Patient alert and oriented to person only; patient is a questionable historian. Fair+ command follow demonstrated. Decreased insight to current abilities/limitations demonstrated. Memory: Impaired  Sequencing: Fair  Problem Solving: Fair  Judgement/Safety: Fair     Functional Assessment:                  AM-PAC Daily Activity Raw Score: 15/24    Initial Eval Status  Date: 5/31/2022 Treatment Status  Date: 6/1/22 Short Term Goals = Long Term Goals   Feeding Setup  setup required Independent   Grooming Min A   Supervision  (seated/standing at sink)   UB Dressing Min A Min A to arrange gown while standing  Setup   LB Dressing Max A  max A to don/doff brief Min A - with use of AE, as needed/appropriate   Bathing Max A   Min A - with use of AE/DME, as needed/appropriate   Toileting Max A  max A for toilet hygiene on BSC Min A   Bed Mobility  Not assessed. Supine to sit min A  SBA in order to maximize patient's independence with ADLs, re-positioning, and other functional tasks. Functional Transfers Sit-to-Stand: Mod A   from bedside chair. Cues given to maximize safety with functional transfers.  Sit <> stand min A  SBA   Functional Mobility Min A  (with walker) for few steps forward/backward. SPT bed <> BSC and steps from [facility] to chair using [employer] min A  Supervision with functional mobility (with device, as needed/appropriate) in order to maximize independence with ADLs/IADLs and other functional tasks. Balance Sitting: Good-  (at edge of chair)  Standing: Fair  (with walker)  Standing balance during ADL min A with [employer] Fair+ dynamic standing balance during completion of ADLs/IADLs and other functional tasks. Activity Tolerance Fair  fair Patient will demonstrate Good understanding and consistent implementation of energy conservation techniques and work simplification techniques into ADL routines. Comments:  Patient agreeable to session. Patient pleasant with good participation demonstrated despite fatigue with minimal exertion. Patient in chair with call light in reach and alarm on at the end of the session. Education/treatment: ADL and functional transfer/activity performed to increase safety and independence during self care tasks. Education provided on safety awareness, adl reeducation, functional transfer training    · Pt has made progress towards set goals.      Time In: 11:43  Time Out: 12:06     Min Units   Therapeutic Ex 13627     Therapeutic Activities 16078 33 2   ADL/Self Care 20790 13 1   Orthotic Management 07885     Neuro Re-Ed 60239     Non-Billable Time     TOTAL TIMED TREATMENT 23 01023 Selin HILTONA/L [id number]

## 2022-06-01 NOTE — PLAN OF CARE
Problem: Discharge Planning  Goal: Discharge to home or other facility with appropriate resources  6/1/2022 0538 by Elsa Barajas RN  Outcome: Progressing     Problem: Pain  Goal: Verbalizes/displays adequate comfort level or baseline comfort level  6/1/2022 1558 by Jodee Villasenor RN  Outcome: Progressing  6/1/2022 0538 by Elsa Barajas RN  Outcome: Progressing     Problem: Skin/Tissue Integrity  Goal: Absence of new skin breakdown  6/1/2022 1558 by Jodee Villasenor RN  Outcome: Progressing  6/1/2022 0538 by Elsa Barajas RN  Outcome: Progressing

## 2022-06-01 NOTE — PLAN OF CARE
Problem: Discharge Planning  Goal: Discharge to home or other facility with appropriate resources  6/1/2022 0538 by Elsa Barajas RN  Outcome: Progressing  5/31/2022 1900 by Manda Joyce RN  Outcome: Progressing     Problem: Pain  Goal: Verbalizes/displays adequate comfort level or baseline comfort level  6/1/2022 0538 by Elsa Barajas RN  Outcome: Progressing  5/31/2022 1900 by Manda Joyce RN  Outcome: Progressing     Problem: Skin/Tissue Integrity  Goal: Absence of new skin breakdown  6/1/2022 0538 by Elsa Barajas RN  Outcome: Progressing  5/31/2022 1900 by Manda Joyce RN  Outcome: Progressing     Problem: Safety - Adult  Goal: Free from fall injury  6/1/2022 0538 by Elsa Barajas RN  Outcome: Progressing  5/31/2022 1900 by Manda Joyce RN  Outcome: Progressing     Problem: ABCDS Injury Assessment  Goal: Absence of physical injury  6/1/2022 0538 by Elsa Barajas RN  Outcome: Progressing  5/31/2022 1900 by Manda Joyce RN  Outcome: Progressing     Problem: Nutrition Deficit:  Goal: Optimize nutritional status  6/1/2022 0538 by Elsa Barajas RN  Outcome: Progressing  5/31/2022 1900 by Manda Joyce RN  Outcome: Progressing     Problem: Chronic Conditions and Co-morbidities  Goal: Patient's chronic conditions and co-morbidity symptoms are monitored and maintained or improved  6/1/2022 0538 by Elsa Barajas RN  Outcome: Progressing  5/31/2022 1900 by Manda Joyce RN  Outcome: Progressing

## 2022-06-02 NOTE — PROGRESS NOTES
Referral received. Chart reviewed. Call placed to daughter, Maricarmen Husain. Per Maricarmen Husain she spoke with [facility] and they like to use [facility] so family is going to use [facility]. Updated DV.   Electronically signed by Nasir Edwards RN on 6/2/2022 at 10:26 AM

## 2022-06-02 NOTE — DISCHARGE SUMMARY
[facility]   Discharge summary   Patient ID:  Bella Aguilar  [id number]  80 y.o.  1934    Admit date: 5/27/2022    Discharge date and time: 6/2/2022    Admission Diagnoses:   Patient Active Problem List   Diagnosis    Multiple subsegmental pulmonary emboli without acute cor pulmonale (HCC)    Obesity    Acute diastolic CHF (congestive heart failure) (HCC)    Pulmonary HTN (HCC)    History of pulmonary embolism    Chronic anticoagulation    Dementia (HCC)    Type 2 diabetes mellitus, without long-term current use of insulin (HCC)    Hyperlipidemia    Hypercalcemia    Chronic CHF (congestive heart failure) ([facility] [address]    CKD (chronic kidney disease)    Severe protein-calorie malnutrition ([facility] [address]       Discharge Diagnoses: hypercalcemia    Consults: nephrology    Procedures: none    Hospital Course:     70-year-old male with a significant past medical history of CKD, dementia, DM is admitted to telemetry unit with     Hypercalcemia of malignancy       monitor labs- calcium ion 1.88/serum calcium 12.5>13.0>12.2>11.7  IV hydration- D5 half-normal saline at 100  Nephrology following  Strict I's and O's  Calcitonin x4 doses  Bone scan- 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 Large malignancy on CT chest-patient with known history of smoking     Diabetes Mellitus  -Monitor labs  -ISS glucose control  -Hypoglycemia protocol initiated  -Diabetes education  -Discuss diet modification      Lengthy discussion today with son and daughter in regards to patient's current status, malignancy, metastatic disease and prognosis. They are agreeable to discussing with hospice. Patient has made it clear many years ago but he would not want anything done. His CODE STATUS with DNR CC prior to admission.      Patient may remain DNR CC at discharge back to his facility    No results for input(s): WBC, HGB, HCT, PLT in the last 72 hours. Recent Labs     05/31/22  1011 06/01/22  0253 06/02/22  0711    137 137   K 3.8 3.4* 3.9   * 109* 109*   CO2 19* 19* 19*   BUN 18 15 14   CREATININE 0.9 0.9 0.9   CALCIUM 11.7* 10.2 10.6*       No results found. Discharge Exam:    HEENT: NCAT,  PERRLA, No JVD  Heart:  RRR, no murmurs, gallops, or rubs. Lungs:  CTA bilaterally, no wheeze, rales or rhonchi  Abd: bowel sounds present, nontender, nondistended, no masses  Extrem:  No clubbing, cyanosis, or edema    Dis[facility]: [facility]     Patient Condition at Discharge: Stable    Patient Instructions:      Medication List        CHANGE how you take these medications      * acetaminophen 650 MG extended release tablet  Commonly known as: TYLENOL  What changed: Another medication with the same name was changed. Make sure you understand how and when to take each. * acetaminophen 325 mg tablet  Commonly known as: TYLENOL  Take 2 tablets by mouth every 6 hours as needed for Pain  What changed:   how much to take  when to take this  reasons to take this  additional instructions     potassium chloride 20 MEQ extended release tablet  Commonly known as: KLOR-CON M  Take 1 tablet by mouth daily  What changed: how much to take           * This list has 2 medication(s) that are the same as other medications prescribed for you. Read the directions carefully, and ask your doctor or other care provider to review them with you.                 CONTINUE taking these medications      ARTIFICIAL TEAR OP     bumetanide 1 MG tablet  Commonly known as: BUMEX  Take 1 tablet by mouth daily     losartan 50 mg tablet  Commonly known as: COZAAR     magnesium hydroxide 400 MG/5ML suspension  Commonly known as: MILK OF MAGNESIA     Melatonin ER 10 MG Tbcr     memantine 10 MG tablet  Commonly known as: NAMENDA     Prevagen Extra Strength 20 MG Caps  Generic drug: Apoaequorin  Take 1 capsule by mouth daily     rosuvastatin 20 MG tablet  Commonly known as: CRESTOR

## 2022-06-02 NOTE — PROGRESS NOTES
[facility]                                Progress note    Subjective: The patient is resting in bed. Awaiting final plans with hospice at [facility]     Objective:    BP (!) 169/70   Pulse (!) 43   Temp 98 °F (36.7 °C) (Oral)   Resp 16   Ht 5' 8\" (1.727 m)   Wt 185 lb 6.5 oz (84.1 kg)   SpO2 93%   BMI 28.19 kg/m²     In: 742.5 [I.V.:742.5]  Out: 200   In: 742.5   Out: 200 [Urine:200]    General appearance: NAD, conversant  HEENT: AT/NC, MMM  Neck: FROM, supple  Lungs: Clear to auscultation  CV: RRR, no MRGs  Vasc: Radial pulses 2+  Abdomen: Soft, non-tender; no masses or HSM  Extremities: No peripheral edema or digital cyanosis, bilateral lower extremity edema  Skin: no rash, lesions or ulcers  Psych: Alert and oriented to person, place and time  Neuro: Alert and interactive     No results for input(s): WBC, HGB, HCT, PLT in the last 72 hours. Recent Labs     05/31/22  1011 06/01/22  0253 06/02/22  0711    137 137   K 3.8 3.4* 3.9   * 109* 109*   CO2 19* 19* 19*   BUN 18 15 14   CREATININE 0.9 0.9 0.9   CALCIUM 11.7* 10.2 10.6*       Assessment:    Principal Problem:    Hypercalcemia  Active Problems:    Chronic CHF (congestive heart failure) (HCC)    CKD (chronic kidney disease)    Severe protein-calorie malnutrition (HCC)    History of pulmonary embolism    Chronic anticoagulation    Dementia (HCC)    Type 2 diabetes mellitus, without long-term current use of insulin (HCC)    Hyperlipidemia  Resolved Problems:    * No resolved hospital problems.  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 Large malignancy on CT chest-patient with known history of smoking     Diabetes Mellitus  -Monitor labs  -ISS glucose control  -Hypoglycemia protocol initiated  -Diabetes education  -Discuss diet modification     Lengthy discussion today with son and daughter in regards to patient's current status, malignancy, metastatic disease and prognosis. They are agreeable to discussing with hospice. Patient has made it clear many years ago but he would not want anything done. His CODE STATUS with DNR CC prior to admission. Patient may remain DNR CC at discharge back to his facility    DVT Prophylaxis   PT/OT  Discharge planning Awaiting final arrangements for patient to [facility]. ADALBERTO Romero CNP  12:43 PM  6/2/2022     Above note edited to reflect my thoughts     I personally saw, examined and provided care for the patient. Radiographs, labs and medication list were reviewed by me independently. The case was discussed in detail and plans for care were established. Review of YUNI Romero   , documentation was conducted and revisions were made as appropriate directly by me. I agree with the above documented exam, problem list, and plan of care.      Dave James MD  6/2/2022

## 2022-06-02 NOTE — CARE COORDINATION
Social Work:    Notified of discharge by charge [employer]. Arranged [facility] ambulance to transfer to today at 2:00 p.m. due to time restraint. Social work called [employer] and notified them, as well as faxed medical updated today. RN to call [facility]. Social work notified daughter Pao Christensen who advised social work to [employer] as they are meeting with [facility].  [facility] is updating Mr. Dillon Kaur. Social work canceled [employer].     Electronically signed by BALDEV Rosado on 6/2/2022 at 1:05 PM

## 2022-06-02 NOTE — PROGRESS NOTES
[facility]                                Progress note    Subjective: The patient is sitting up in chair without any complaints. No acute events overnight. Denies chest pain, angina, SOB     Objective:    BP (!) 117/52   Pulse 65   Temp 97.7 °F (36.5 °C) (Oral)   Resp 16   Ht 5' 8\" (1.727 m)   Wt 177 lb 0.5 oz (80.3 kg)   SpO2 95%   BMI 26.92 kg/m²     In: 3007.3 [I.V.:2495.7]  Out: 300   In: 3007.3   Out: 300 [Urine:300]    General appearance: NAD, conversant  HEENT: AT/NC, MMM  Neck: FROM, supple  Lungs: Clear to auscultation  CV: RRR, no MRGs  Vasc: Radial pulses 2+  Abdomen: Soft, non-tender; no masses or HSM  Extremities: No peripheral edema or digital cyanosis, bilateral lower extremity edema  Skin: no rash, lesions or ulcers  Psych: Alert and oriented to person, place and time  Neuro: Alert and interactive     No results for input(s): WBC, HGB, HCT, PLT in the last 72 hours. Recent Labs     05/30/22  1100 05/31/22  1011 06/01/22  0253    144 137   K 4.7 3.8 3.4*   * 114* 109*   CO2 18* 19* 19*   BUN 20 18 15   CREATININE 1.0 0.9 0.9   CALCIUM 12.2* 11.7* 10.2       Assessment:    Principal Problem:    Hypercalcemia  Active Problems:    Chronic CHF (congestive heart failure) (HCC)    CKD (chronic kidney disease)    Severe protein-calorie malnutrition (HCC)    History of pulmonary embolism    Chronic anticoagulation    Dementia (HCC)    Type 2 diabetes mellitus, without long-term current use of insulin (HCC)    Hyperlipidemia  Resolved Problems:    * No resolved hospital problems.  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 Large malignancy on CT chestpatient with known history of smoking     Diabetes Mellitus  -Monitor labs  -ISS glucose control  -Hypoglycemia protocol initiated  -Diabetes education  -Discuss diet modification     Lengthy discussion today with son and daughter in regards to patient's current status, malignancy, metastatic disease and prognosis. They are agreeable to discussing with hospice. Patient has made it clear many years ago but he would not want anything done. His CODE STATUS with DNR CC prior to admission.     Patient may remain DNR CC at discharge back to his facility    DVT Prophylaxis   PT/OT  Discharge Jazzmine Cisneros MD  8:25 PM  6/1/2022

## 2022-06-02 NOTE — CARE COORDINATION
Spoke with pt's daughter Mikayla Kwon re; Hospice consult. She wishes to speak with [facility]. Referral called. Plan is for pt to return to [address] [facility].  with [facility]. Orders on chart. Await medical clearance for discharge. Lorrie Gowers.

## 2022-06-02 NOTE — DISCHARGE INSTR - COC
Continuity of Care Form    Patient Name: Nadya Long   :  1934  MRN:  [id number]    Admit date:  2022  Discharge date:  22    Code Status Order: DNR-CC   Advance Directives:      Admitting Physician:  Aravind Benitez MD  PCP: Awa Brasher MD    Discharging Nurse:  Denise Horner Dr Unit/Room#: 1628/4919-P  Discharging Unit Phone Number: [phone]    Emergency Contact:   Extended Emergency Contact Information  Primary Emergency Contact: Sahil Stewart  Address: [address] [place] [address] Phone: [phone]  Relation: Child  Secondary Emergency Contact: [address] [place] [address] Phone: [phone]  Relation: Child    Past Surgical History:  Past Surgical History:   Procedure Laterality Date    JOINT REPLACEMENT      VASECTOMY         Immunization History:   Immunization History   Administered Date(s) Administered    Influenza, Triv, inactivated, subunit, adjuvanted, IM (Fluad 65 yrs and older) 09/10/2018, 10/16/2019    Pneumococcal Conjugate 13-valent (Tahira Gina) 2019    Zoster Recombinant (Shingrix) 2019, 2019       Active Problems:  Patient Active Problem List   Diagnosis Code    Multiple subsegmental pulmonary emboli without acute cor pulmonale ([facility]) I26.94    Obesity D52.1    Acute diastolic CHF (congestive heart failure) ([facility]) I50.31    Pulmonary HTN ([facility] [address] I27.20    History of pulmonary embolism Z86.711    Chronic anticoagulation Z79.01    Dementia ([facility] [address] F03.90    Type 2 diabetes mellitus, without long-term current use of insulin ([facility]) E11.9    Hyperlipidemia E78.5    Hypercalcemia E83.52    Chronic CHF (congestive heart failure) ([facility]) I50.9    CKD (chronic kidney disease) N18.9    Severe protein-calorie malnutrition ([facility] [address] E43       Isolation/Infection:   Isolation            No Isolation          Patient Infection Status       Infection Onset Added Last Indicated Last Indicated By Review Planned Expiration Resolved Resolved By    None active    Resolved    COVID-19 (Rule Out) 20 COVID-19 (Ordered)   20     COVID-19 (Rule Out) 20 COVID-19 (Ordered)   20 Rule-Out Test Resulted    Not detected 2020            Nurse Assessment:  Last Vital Signs: BP (!) 169/70   Pulse (!) 43   Temp 98 °F (36.7 °C) (Oral)   Resp 16   Ht 5' 8\" (1.727 m)   Wt 185 lb 6.5 oz (84.1 kg)   SpO2 93%   BMI 28.19 kg/m²     Last documented pain score (0-10 scale): Pain Level: 0  Last Weight:   Wt Readings from Last 1 Encounters:   22 185 lb 6.5 oz (84.1 kg)     Mental Status:  disoriented and alert    IV Access:  - None    Nursing Mobility/ADLs:  Walking   Assisted  Transfer  Assisted  Bathing  Assisted  Dressing  Assisted  Toileting  Assisted  Feeding  Assisted  Med Admin  Assisted  Med Delivery   whole    Wound Care Documentation and Therapy:        Elimination:  Continence: Bowel: incontinent at times  Bladder: incontinent at times  Urinary Catheter: None   Colostomy/Ileostomy/Ileal Conduit: No       Date of Last BM: 22    Intake/Output Summary (Last 24 hours) at 2022 1350  Last data filed at 2022 1035  Gross per 24 hour   Intake 742.51 ml   Output 1050 ml   Net -307.49 ml     I/O last 3 completed shifts: In: 3007.3 [I.V.:2495.7; IV Piggyback:511.6]  Out: 500 [Urine:500]    Safety Concerns: At Risk for Falls    Impairments/Disabilities:      Hearing    Nutrition Therapy:  Current Nutrition Therapy:   - Oral Diet:  Low Fat, Low Fiber, and Low Sodium (2gm)    Routes of Feeding: Oral  Liquids: Thin Liquids  Daily Fluid Restriction: no  Last Modified Barium Swallow with Video (Video Swallowing Test): not done    Treatments at the Time of Hospital Discharge:   Respiratory Treatments: ***  Oxygen Therapy:  is not on home oxygen therapy.   Ventilator:    - No ventilator support    Rehab Therapies: Physical Therapy and Occupational Therapy  Weight Bearing Status/Restrictions: No weight bearing restrictions  Other Medical Equipment (for information only, NOT a DME order):  ***  Other Treatments: ***    Patient's personal belongings (please select all that are sent with patient):  {[facility] DME Belongings:220301529}    RN SIGNATURE:  Electronically signed by Suzie López RN on 6/2/22 at 1:52 PM EDT    CASE MANAGEMENT/SOCIAL WORK SECTION    Inpatient Status Date: ***    Readmission Risk Assessment Score:  Readmission Risk              Risk of Unplanned Readmission:  17           Discharging to Facility/ Agency   Name:   Address:  Phone:  Fax:    Dialysis Facility (if applicable)   Name:  Address:  Dialysis Schedule:  Phone:  Fax:    / signature: {Esignature:[id number]}    PHYSICIAN SECTION    Prognosis: {Prognosis:7279632479}    Condition at Discharge: Amanda Shepherd Patient Condition:101055110}    Rehab Potential (if transferring to Rehab): {Prognosis:1420783607}    Recommended Labs or Other Treatments After Discharge: ***    Physician Certification: I certify the above information and transfer of Tiki Duval  is necessary for the continuing treatment of the diagnosis listed and that he requires {Admit to Appropriate Level of Care:66352} for {GREATER/LESS:183075413} 30 days.      Update Admission H&P: [facility]}    PHYSICIAN SIGNATURE:  {Esignature:[id number]}

## 2022-06-02 NOTE — CARE COORDINATION
Silverio fernandez at [facility]; daughter Jing Nielson now wants [facility] hospice only; referral to [facility] cancelled. Requested chart info faxed to kristina at [facility]  ( [phone]  Fax [phone]). Placido Chapin.

## 2022-06-02 NOTE — DISCHARGE INSTR - DIET

## 2022-06-15 NOTE — PROGRESS NOTES
created by: Celia January on 6/7/2022 1:40 PM      Electronically signed by:  EMERSON Vidales MD 6/15/2022 8:59 AM

## 2023-03-16 NOTE — ED NOTES
Pt walking in little. Gait steady. Pt states \"I'm just walking\". Pt reminded where is room is. Pt calm, cooperative. Pt denies SOB. States he isn't sure why he is here.       Anival Rosales RN  08/31/21 7669 Detail Level: Zone

## 2023-09-17 NOTE — TELEPHONE ENCOUNTER
Daughter states they spoke with Dr Tj Garcia previously re: completing a form to revoke pt's 's license. Also states other family members will be taking pt's car so he will be unable to drive. Should they send form here for completion?
Daughter, Alfred Villela notified that  will complete the form. She will mail it to us.
Will complete paperwork.
12